# Patient Record
Sex: FEMALE | Race: WHITE | HISPANIC OR LATINO | Employment: FULL TIME | URBAN - METROPOLITAN AREA
[De-identification: names, ages, dates, MRNs, and addresses within clinical notes are randomized per-mention and may not be internally consistent; named-entity substitution may affect disease eponyms.]

---

## 2019-03-27 PROCEDURE — 99284 EMERGENCY DEPT VISIT MOD MDM: CPT

## 2019-03-27 PROCEDURE — 96365 THER/PROPH/DIAG IV INF INIT: CPT

## 2019-03-27 PROCEDURE — 96375 TX/PRO/DX INJ NEW DRUG ADDON: CPT

## 2019-03-28 ENCOUNTER — APPOINTMENT (OUTPATIENT)
Dept: RADIOLOGY | Facility: MEDICAL CENTER | Age: 39
End: 2019-03-28
Attending: EMERGENCY MEDICINE
Payer: MEDICAID

## 2019-03-28 ENCOUNTER — HOSPITAL ENCOUNTER (EMERGENCY)
Facility: MEDICAL CENTER | Age: 39
End: 2019-03-28
Attending: EMERGENCY MEDICINE
Payer: MEDICAID

## 2019-03-28 VITALS
BODY MASS INDEX: 31.61 KG/M2 | WEIGHT: 156.53 LBS | DIASTOLIC BLOOD PRESSURE: 82 MMHG | RESPIRATION RATE: 17 BRPM | SYSTOLIC BLOOD PRESSURE: 147 MMHG | HEART RATE: 107 BPM | OXYGEN SATURATION: 97 % | TEMPERATURE: 97.9 F

## 2019-03-28 DIAGNOSIS — T78.40XA ALLERGIC REACTION, INITIAL ENCOUNTER: ICD-10-CM

## 2019-03-28 DIAGNOSIS — J45.21 MILD INTERMITTENT ASTHMA WITH ACUTE EXACERBATION: ICD-10-CM

## 2019-03-28 DIAGNOSIS — B34.9 VIRAL SYNDROME: ICD-10-CM

## 2019-03-28 DIAGNOSIS — J06.9 UPPER RESPIRATORY TRACT INFECTION, UNSPECIFIED TYPE: ICD-10-CM

## 2019-03-28 LAB
ANION GAP SERPL CALC-SCNC: 7 MMOL/L (ref 0–11.9)
BASOPHILS # BLD AUTO: 0.6 % (ref 0–1.8)
BASOPHILS # BLD: 0.06 K/UL (ref 0–0.12)
BUN SERPL-MCNC: 9 MG/DL (ref 8–22)
CALCIUM SERPL-MCNC: 9 MG/DL (ref 8.5–10.5)
CHLORIDE SERPL-SCNC: 108 MMOL/L (ref 96–112)
CO2 SERPL-SCNC: 23 MMOL/L (ref 20–33)
CREAT SERPL-MCNC: 0.57 MG/DL (ref 0.5–1.4)
EOSINOPHIL # BLD AUTO: 0.6 K/UL (ref 0–0.51)
EOSINOPHIL NFR BLD: 6.4 % (ref 0–6.9)
ERYTHROCYTE [DISTWIDTH] IN BLOOD BY AUTOMATED COUNT: 51.3 FL (ref 35.9–50)
FLUAV RNA SPEC QL NAA+PROBE: NEGATIVE
FLUBV RNA SPEC QL NAA+PROBE: NEGATIVE
GLUCOSE SERPL-MCNC: 97 MG/DL (ref 65–99)
HCG SERPL QL: NEGATIVE
HCT VFR BLD AUTO: 39.7 % (ref 37–47)
HGB BLD-MCNC: 13 G/DL (ref 12–16)
IMM GRANULOCYTES # BLD AUTO: 0.02 K/UL (ref 0–0.11)
IMM GRANULOCYTES NFR BLD AUTO: 0.2 % (ref 0–0.9)
LYMPHOCYTES # BLD AUTO: 0.7 K/UL (ref 1–4.8)
LYMPHOCYTES NFR BLD: 7.5 % (ref 22–41)
MCH RBC QN AUTO: 26.7 PG (ref 27–33)
MCHC RBC AUTO-ENTMCNC: 32.7 G/DL (ref 33.6–35)
MCV RBC AUTO: 81.5 FL (ref 81.4–97.8)
MONOCYTES # BLD AUTO: 0.37 K/UL (ref 0–0.85)
MONOCYTES NFR BLD AUTO: 3.9 % (ref 0–13.4)
NEUTROPHILS # BLD AUTO: 7.62 K/UL (ref 2–7.15)
NEUTROPHILS NFR BLD: 81.4 % (ref 44–72)
NRBC # BLD AUTO: 0 K/UL
NRBC BLD-RTO: 0 /100 WBC
PLATELET # BLD AUTO: 389 K/UL (ref 164–446)
PMV BLD AUTO: 9.1 FL (ref 9–12.9)
POTASSIUM SERPL-SCNC: 3.2 MMOL/L (ref 3.6–5.5)
RBC # BLD AUTO: 4.87 M/UL (ref 4.2–5.4)
SODIUM SERPL-SCNC: 138 MMOL/L (ref 135–145)
WBC # BLD AUTO: 9.4 K/UL (ref 4.8–10.8)

## 2019-03-28 PROCEDURE — 96375 TX/PRO/DX INJ NEW DRUG ADDON: CPT

## 2019-03-28 PROCEDURE — 80048 BASIC METABOLIC PNL TOTAL CA: CPT

## 2019-03-28 PROCEDURE — 96365 THER/PROPH/DIAG IV INF INIT: CPT

## 2019-03-28 PROCEDURE — 700101 HCHG RX REV CODE 250: Performed by: EMERGENCY MEDICINE

## 2019-03-28 PROCEDURE — 84703 CHORIONIC GONADOTROPIN ASSAY: CPT

## 2019-03-28 PROCEDURE — 87502 INFLUENZA DNA AMP PROBE: CPT

## 2019-03-28 PROCEDURE — 94640 AIRWAY INHALATION TREATMENT: CPT

## 2019-03-28 PROCEDURE — 71045 X-RAY EXAM CHEST 1 VIEW: CPT

## 2019-03-28 PROCEDURE — A9270 NON-COVERED ITEM OR SERVICE: HCPCS | Performed by: EMERGENCY MEDICINE

## 2019-03-28 PROCEDURE — 85025 COMPLETE CBC W/AUTO DIFF WBC: CPT

## 2019-03-28 PROCEDURE — 700111 HCHG RX REV CODE 636 W/ 250 OVERRIDE (IP): Performed by: EMERGENCY MEDICINE

## 2019-03-28 PROCEDURE — 700102 HCHG RX REV CODE 250 W/ 637 OVERRIDE(OP): Performed by: EMERGENCY MEDICINE

## 2019-03-28 RX ORDER — MAGNESIUM SULFATE HEPTAHYDRATE 40 MG/ML
2 INJECTION, SOLUTION INTRAVENOUS ONCE
Status: COMPLETED | OUTPATIENT
Start: 2019-03-28 | End: 2019-03-28

## 2019-03-28 RX ORDER — DEXAMETHASONE SODIUM PHOSPHATE 10 MG/ML
16 INJECTION, SOLUTION INTRAMUSCULAR; INTRAVENOUS ONCE
Status: COMPLETED | OUTPATIENT
Start: 2019-03-28 | End: 2019-03-28

## 2019-03-28 RX ORDER — DIPHENHYDRAMINE HCL 25 MG
50 TABLET ORAL ONCE
Status: COMPLETED | OUTPATIENT
Start: 2019-03-28 | End: 2019-03-28

## 2019-03-28 RX ADMIN — MAGNESIUM SULFATE IN WATER 2 G: 40 INJECTION, SOLUTION INTRAVENOUS at 01:30

## 2019-03-28 RX ADMIN — DEXAMETHASONE SODIUM PHOSPHATE 16 MG: 10 INJECTION, SOLUTION INTRAMUSCULAR; INTRAVENOUS at 01:30

## 2019-03-28 RX ADMIN — ALBUTEROL SULFATE 2.5 MG: 2.5 SOLUTION RESPIRATORY (INHALATION) at 01:39

## 2019-03-28 RX ADMIN — ALBUTEROL SULFATE 2.5 MG: 2.5 SOLUTION RESPIRATORY (INHALATION) at 01:40

## 2019-03-28 RX ADMIN — DIPHENHYDRAMINE HCL 50 MG: 25 TABLET ORAL at 01:30

## 2019-03-28 RX ADMIN — ALBUTEROL SULFATE 2.5 MG: 2.5 SOLUTION RESPIRATORY (INHALATION) at 01:34

## 2019-03-28 ASSESSMENT — PAIN SCALES - WONG BAKER: WONGBAKER_NUMERICALRESPONSE: DOESN'T HURT AT ALL

## 2019-03-28 ASSESSMENT — ENCOUNTER SYMPTOMS
NAUSEA: 0
DIZZINESS: 1
VOMITING: 0
SHORTNESS OF BREATH: 1
ABDOMINAL PAIN: 0
COUGH: 1

## 2019-03-28 NOTE — ED NOTES
Pt understands discharge instructions follow up.  No S/S of adverse reactions lungs clear, speaking in full sentences

## 2019-03-28 NOTE — ED TRIAGE NOTES
Windy Saldivar  38 y.o. female  Chief Complaint   Patient presents with   • Flu Like Symptoms     cough, congestion, chills, malaise x 12 hours       Pt amb to triage with steady gait for above complaint.   Pt is alert and oriented, speaking in full sentences, follows commands and responds appropriately to questions. NAD. Resp are even and unlabored.  Pt placed in lobby. Pt educated on triage process. Pt encouraged to alert staff for any changes.  /92   Pulse (!) 118   Temp 36.6 °C (97.9 °F) (Temporal)   Resp 16   Wt 71 kg (156 lb 8.4 oz)   SpO2 95%   BMI 31.61 kg/m²

## 2019-03-28 NOTE — ED PROVIDER NOTES
ED Provider Note    Scribed for Breanne Abarca M.D. by Laura Guadalupe. 3/28/2019, 1:06 AM.    Means of arrival: walk in   History obtained from: patient   History limited by: none     CHIEF COMPLAINT  Chief Complaint   Patient presents with   • Flu Like Symptoms     cough, congestion, chills, malaise x 12 hours       HPI  Windy Saldivar is a 38 y.o. female with a history of asthma who presents to the Emergency Department with complaints of flu like symptoms such as cough and nasal congestion for the last 12 hours. The patient reports associated difficulty breathing, chills, and general malaise. The patient also reports that she took [facility] [place] Cold and Flu and suspects it may contain aspirin, because she is allergic to aspirin and has had more difficulty breathing and dizziness since taking it a couple of hours ago. She denies possibility of pregnancy. The patient has an inhaler at home which she used several times today. She denies chest pain, nausea, vomiting, abdominal pain    REVIEW OF SYSTEMS  Review of Systems   Constitutional: Positive for malaise/fatigue.   HENT: Positive for congestion.    Respiratory: Positive for cough and shortness of breath.    Gastrointestinal: Negative for abdominal pain, nausea and vomiting.   Neurological: Positive for dizziness.   All other systems reviewed and are negative.      PAST MEDICAL HISTORY   has a past medical history of ASTHMA; Hypertension; Low back pain; and PID (acute pelvic inflammatory disease) (5/23/2014).    SURGICAL HISTORY   has a past surgical history that includes IUD Removal (5/25/2014).    SOCIAL HISTORY  Social History   Substance Use Topics   • Smoking status: Current Every Day Smoker     Packs/day: 0.50     Last attempt to quit: 11/1/2013   • Smokeless tobacco: Not on file      Comment: quit 5 mos ago   • Alcohol use Yes      Comment: 2x week      History   Drug Use No       FAMILY HISTORY  History reviewed. No pertinent family  "history.    CURRENT MEDICATIONS  Home Medications     Reviewed by Dave Acosta R.N. (Registered Nurse) on 03/28/19 at 0012  Med List Status: <None>   Medication Last Dose Status   albuterol (PROVENTIL) 2.5mg/3ml Nebu Soln solution for nebulization  Active   albuterol (PROVENTIL) 2.5mg/3ml Nebu Soln solution for nebulization  Active   albuterol (PROVENTIL) 2.5mg/3ml Nebu Soln solution for nebulization  Active   albuterol (VENTOLIN OR PROVENTIL) 108 (90 BASE) MCG/ACT Aero Soln inhalation aerosol  Active                ALLERGIES  Allergies   Allergen Reactions   • Aspirin      Shortness of breath, asthma exacerbation, and \"a congested feeling.\"       PHYSICAL EXAM  VITAL SIGNS: /92   Pulse (!) 118   Temp 36.6 °C (97.9 °F) (Temporal)   Resp 16   Wt 71 kg (156 lb 8.4 oz)   SpO2 95%   BMI 31.61 kg/m²   Vitals reviewed by myself.  Physical Exam  Nursing note and vitals reviewed.  Constitutional: Well-developed and well-nourished. No acute distress. Appears short of breath   HENT: Head is normocephalic and atraumatic.  Eyes: extra-ocular movements intact  Cardiovascular: tachycardic rate and regular rhythm. No murmur heard.  Pulmonary/Chest: Diminished air movement throughout all lung fields. No wheezes or rales.   Abdominal: Soft and non-tender. No distention.    Musculoskeletal: Extremities exhibit normal range of motion without edema or tenderness.   Neurological: Awake and alert  Skin: Skin is warm and dry. No rash.       DIAGNOSTIC STUDIES /  LABS  Labs Reviewed   CBC WITH DIFFERENTIAL - Abnormal; Notable for the following:        Result Value    MCH 26.7 (*)     MCHC 32.7 (*)     RDW 51.3 (*)     Neutrophils-Polys 81.40 (*)     Lymphocytes 7.50 (*)     Neutrophils (Absolute) 7.62 (*)     Lymphs (Absolute) 0.70 (*)     Eos (Absolute) 0.60 (*)     All other components within normal limits   BASIC METABOLIC PANEL - Abnormal; Notable for the following:     Potassium 3.2 (*)     All other components within " normal limits   INFLUENZA A/B BY PCR   HCG QUAL SERUM   ESTIMATED GFR       All labs reviewed by me.    RADIOLOGY  DX-CHEST-PORTABLE (1 VIEW)   Final Result      No radiographic evidence of acute cardiopulmonary process.        The radiologist's interpretation of all radiological studies have been reviewed by me.        REASSESSMENT  1:10 AM - I evaluated patient at bedside.  I informed them of my plan of care today given their presentation.     2:30 AM - I reevaluated patient at bedside and discussed diagnostic study results. She has good aeration in all lung fields. I also discussed the plan for discharge as outlined below.       COURSE & MEDICAL DECISION MAKING  Nursing notes, VS, PMSFHx reviewed in chart.    Patient is a 38 year old female who comes in for shortness of breath.  Differential diagnosis includes upper respiratory infection, viral syndrome, asthma exacerbation, influenza, pneumonia, allergic reaction.  Diagnostic workup includes labs, chest x-ray, and influenza swab.    Initial assessment patient appears mildly short of breath with diminished air movement in all lung fields and associated tachycardia.  This could be secondary to asthma exacerbation versus possible allergic reaction.  Patient is treated with Benadryl, dexamethasone, magnesium, and back to back albuterol treatments.  After treatments patient is feeling greatly improved and her tachycardia is also noted to trend downwards.  Her air movement in all lung fields is improved on auscultation.  Labs returned and are unremarkable.  Flu swab is negative.  Chest x-ray demonstrates no evidence of pneumonia.  Therefore patient is reassured and advised on symptomatic viral illness versus asthma exacerbation versus allergic reaction.  Patient is then given strict return precautions and discharged home in stable condition.      FINAL IMPRESSION  1. Upper respiratory tract infection, unspecified type    2. Viral syndrome    3. Allergic reaction,  initial encounter    4. Mild intermittent asthma with acute exacerbation          ILaura (Scribe), am scribing for, and in the presence of, Breanne Abarca M.D..    Electronically signed by: Laura Guadalupe (Scribe), 3/28/2019    IBreanne M.D. personally performed the services described in this documentation, as scribed by Laura Guadalupe in my presence, and it is both accurate and complete.    C    The note accurately reflects work and decisions made by me.  Breanne Abarca  3/28/2019  3:59 AM

## 2020-03-18 ENCOUNTER — TELEPHONE (OUTPATIENT)
Dept: NEUROLOGY | Facility: MEDICAL CENTER | Age: 40
End: 2020-03-18

## 2020-03-18 NOTE — TELEPHONE ENCOUNTER
1. Caller Name: Windy                        Call Back Number:   [facility] PCP or Specialty Provider: No          2.  Does patient have any active symptoms of respiratory illness (fever OR cough OR shortness of breath)? Yes, the patient reports the following respiratory symptoms: shortness of breath.    3.  Does patient have any comoribidities? COPD    4.  In the last 30 days, has the patient traveled outside of the country OR in a high risk area within the [place] OR have any known contact with someone who has or is suspected to have COVID-19?  No.    5. Disposition: Advised to perform self care, monitor for worsening symptoms and to call back in 3 days if no improvement, if SOB unrelieved by nebulizers, call 911    Note routed to PCP: STEPHANIE only.

## 2023-02-17 ENCOUNTER — HOSPITAL ENCOUNTER (OUTPATIENT)
Dept: RADIOLOGY | Facility: MEDICAL CENTER | Age: 43
End: 2023-02-17
Payer: COMMERCIAL

## 2023-07-16 ENCOUNTER — HOSPITAL ENCOUNTER (INPATIENT)
Facility: MEDICAL CENTER | Age: 43
LOS: 4 days | DRG: 433 | End: 2023-07-20
Attending: EMERGENCY MEDICINE | Admitting: INTERNAL MEDICINE
Payer: COMMERCIAL

## 2023-07-16 DIAGNOSIS — K70.31 ALCOHOLIC CIRRHOSIS OF LIVER WITH ASCITES (HCC): ICD-10-CM

## 2023-07-16 DIAGNOSIS — K80.20 CALCULUS OF GALLBLADDER WITHOUT CHOLECYSTITIS WITHOUT OBSTRUCTION: ICD-10-CM

## 2023-07-16 DIAGNOSIS — K92.2 GASTROINTESTINAL HEMORRHAGE, UNSPECIFIED GASTROINTESTINAL HEMORRHAGE TYPE: ICD-10-CM

## 2023-07-16 DIAGNOSIS — F10.930 ALCOHOL WITHDRAWAL SYNDROME WITHOUT COMPLICATION (HCC): ICD-10-CM

## 2023-07-16 PROCEDURE — 80053 COMPREHEN METABOLIC PANEL: CPT

## 2023-07-16 PROCEDURE — 85610 PROTHROMBIN TIME: CPT

## 2023-07-16 PROCEDURE — 85730 THROMBOPLASTIN TIME PARTIAL: CPT

## 2023-07-16 PROCEDURE — 36415 COLL VENOUS BLD VENIPUNCTURE: CPT

## 2023-07-16 PROCEDURE — 82140 ASSAY OF AMMONIA: CPT

## 2023-07-16 PROCEDURE — 770020 HCHG ROOM/CARE - TELE (206)

## 2023-07-16 PROCEDURE — HZ2ZZZZ DETOXIFICATION SERVICES FOR SUBSTANCE ABUSE TREATMENT: ICD-10-PCS | Performed by: INTERNAL MEDICINE

## 2023-07-16 PROCEDURE — 83690 ASSAY OF LIPASE: CPT

## 2023-07-16 PROCEDURE — 96374 THER/PROPH/DIAG INJ IV PUSH: CPT

## 2023-07-16 PROCEDURE — 700111 HCHG RX REV CODE 636 W/ 250 OVERRIDE (IP): Performed by: EMERGENCY MEDICINE

## 2023-07-16 PROCEDURE — 99285 EMERGENCY DEPT VISIT HI MDM: CPT

## 2023-07-16 PROCEDURE — 83605 ASSAY OF LACTIC ACID: CPT

## 2023-07-16 PROCEDURE — 87040 BLOOD CULTURE FOR BACTERIA: CPT

## 2023-07-16 PROCEDURE — 99223 1ST HOSP IP/OBS HIGH 75: CPT | Performed by: INTERNAL MEDICINE

## 2023-07-16 RX ORDER — HYDROMORPHONE HYDROCHLORIDE 1 MG/ML
0.5 INJECTION, SOLUTION INTRAMUSCULAR; INTRAVENOUS; SUBCUTANEOUS ONCE
Status: COMPLETED | OUTPATIENT
Start: 2023-07-16 | End: 2023-07-16

## 2023-07-16 RX ORDER — NICOTINE 21 MG/24HR
21 PATCH, TRANSDERMAL 24 HOURS TRANSDERMAL
Status: CANCELLED | OUTPATIENT
Start: 2023-07-17

## 2023-07-16 RX ORDER — ALBUTEROL SULFATE 2.5 MG/3ML
2.5 SOLUTION RESPIRATORY (INHALATION) EVERY 4 HOURS PRN
Status: DISCONTINUED | OUTPATIENT
Start: 2023-07-16 | End: 2023-07-20 | Stop reason: HOSPADM

## 2023-07-16 RX ORDER — LORAZEPAM 2 MG/ML
2 INJECTION INTRAMUSCULAR
Status: DISCONTINUED | OUTPATIENT
Start: 2023-07-16 | End: 2023-07-18

## 2023-07-16 RX ORDER — LORAZEPAM 2 MG/ML
0.5 INJECTION INTRAMUSCULAR EVERY 4 HOURS PRN
Status: DISCONTINUED | OUTPATIENT
Start: 2023-07-16 | End: 2023-07-18

## 2023-07-16 RX ORDER — LORAZEPAM 2 MG/1
2 TABLET ORAL
Status: DISCONTINUED | OUTPATIENT
Start: 2023-07-16 | End: 2023-07-18

## 2023-07-16 RX ORDER — LORAZEPAM 2 MG/ML
1 INJECTION INTRAMUSCULAR ONCE
Status: COMPLETED | OUTPATIENT
Start: 2023-07-16 | End: 2023-07-17

## 2023-07-16 RX ORDER — LORAZEPAM 2 MG/ML
1.5 INJECTION INTRAMUSCULAR
Status: DISCONTINUED | OUTPATIENT
Start: 2023-07-16 | End: 2023-07-18

## 2023-07-16 RX ORDER — LORAZEPAM 1 MG/1
1 TABLET ORAL EVERY 4 HOURS PRN
Status: DISCONTINUED | OUTPATIENT
Start: 2023-07-16 | End: 2023-07-18

## 2023-07-16 RX ORDER — LORAZEPAM 2 MG/1
4 TABLET ORAL
Status: DISCONTINUED | OUTPATIENT
Start: 2023-07-16 | End: 2023-07-18

## 2023-07-16 RX ORDER — SODIUM CHLORIDE, SODIUM LACTATE, POTASSIUM CHLORIDE, CALCIUM CHLORIDE 600; 310; 30; 20 MG/100ML; MG/100ML; MG/100ML; MG/100ML
INJECTION, SOLUTION INTRAVENOUS CONTINUOUS
Status: DISCONTINUED | OUTPATIENT
Start: 2023-07-16 | End: 2023-07-17

## 2023-07-16 RX ORDER — LORAZEPAM 2 MG/ML
1 INJECTION INTRAMUSCULAR
Status: DISCONTINUED | OUTPATIENT
Start: 2023-07-16 | End: 2023-07-18

## 2023-07-16 RX ORDER — LORAZEPAM 0.5 MG/1
0.5 TABLET ORAL EVERY 4 HOURS PRN
Status: DISCONTINUED | OUTPATIENT
Start: 2023-07-16 | End: 2023-07-18

## 2023-07-16 RX ADMIN — HYDROMORPHONE HYDROCHLORIDE 0.5 MG: 1 INJECTION, SOLUTION INTRAMUSCULAR; INTRAVENOUS; SUBCUTANEOUS at 22:57

## 2023-07-16 ASSESSMENT — PAIN DESCRIPTION - PAIN TYPE: TYPE: ACUTE PAIN

## 2023-07-17 ENCOUNTER — APPOINTMENT (OUTPATIENT)
Dept: RADIOLOGY | Facility: MEDICAL CENTER | Age: 43
DRG: 433 | End: 2023-07-17
Attending: INTERNAL MEDICINE
Payer: COMMERCIAL

## 2023-07-17 ENCOUNTER — APPOINTMENT (OUTPATIENT)
Dept: RADIOLOGY | Facility: MEDICAL CENTER | Age: 43
DRG: 433 | End: 2023-07-17
Attending: RADIOLOGY
Payer: COMMERCIAL

## 2023-07-17 ENCOUNTER — APPOINTMENT (OUTPATIENT)
Dept: CARDIOLOGY | Facility: MEDICAL CENTER | Age: 43
DRG: 433 | End: 2023-07-17
Attending: INTERNAL MEDICINE
Payer: COMMERCIAL

## 2023-07-17 ENCOUNTER — HOSPITAL ENCOUNTER (OUTPATIENT)
Dept: RADIOLOGY | Facility: MEDICAL CENTER | Age: 43
End: 2023-07-17

## 2023-07-17 PROBLEM — R65.10 SIRS (SYSTEMIC INFLAMMATORY RESPONSE SYNDROME) (HCC): Status: ACTIVE | Noted: 2023-07-17

## 2023-07-17 PROBLEM — F10.20 ALCOHOL DEPENDENCE (HCC): Status: ACTIVE | Noted: 2023-07-17

## 2023-07-17 PROBLEM — R18.8 ASCITES: Status: ACTIVE | Noted: 2023-07-17

## 2023-07-17 PROBLEM — J90 PLEURAL EFFUSION: Status: ACTIVE | Noted: 2023-07-17

## 2023-07-17 PROBLEM — K70.30 ALC (ALCOHOLIC LIVER CIRRHOSIS) (HCC): Status: ACTIVE | Noted: 2023-07-17

## 2023-07-17 PROBLEM — R10.9 ABDOMINAL PAIN: Status: ACTIVE | Noted: 2023-07-17

## 2023-07-17 LAB
ALBUMIN SERPL BCP-MCNC: 2.6 G/DL (ref 3.2–4.9)
ALBUMIN SERPL BCP-MCNC: 2.7 G/DL (ref 3.2–4.9)
ALBUMIN/GLOB SERPL: 0.6 G/DL
ALBUMIN/GLOB SERPL: 0.6 G/DL
ALP SERPL-CCNC: 186 U/L (ref 30–99)
ALP SERPL-CCNC: 193 U/L (ref 30–99)
ALT SERPL-CCNC: 22 U/L (ref 2–50)
ALT SERPL-CCNC: 25 U/L (ref 2–50)
AMMONIA PLAS-SCNC: 63 UMOL/L (ref 11–45)
AMYLASE FLD-CCNC: 15 U/L
ANION GAP SERPL CALC-SCNC: 10 MMOL/L (ref 7–16)
ANION GAP SERPL CALC-SCNC: 7 MMOL/L (ref 7–16)
ANISOCYTOSIS BLD QL SMEAR: ABNORMAL
APPEARANCE FLD: NORMAL
APPEARANCE UR: CLEAR
APPEARANCE UR: CLEAR
APTT PPP: 46.2 SEC (ref 24.7–36)
AST SERPL-CCNC: 84 U/L (ref 12–45)
AST SERPL-CCNC: 91 U/L (ref 12–45)
BACTERIA #/AREA URNS HPF: ABNORMAL /HPF
BACTERIA #/AREA URNS HPF: NEGATIVE /HPF
BASOPHILS # BLD AUTO: 0.9 % (ref 0–1.8)
BASOPHILS # BLD: 0.12 K/UL (ref 0–0.12)
BILIRUB CONJ SERPL-MCNC: 4.9 MG/DL (ref 0.1–0.5)
BILIRUB SERPL-MCNC: 10 MG/DL (ref 0.1–1.5)
BILIRUB SERPL-MCNC: 9.7 MG/DL (ref 0.1–1.5)
BILIRUB UR QL STRIP.AUTO: ABNORMAL
BODY FLD TYPE: NORMAL
BUN SERPL-MCNC: 10 MG/DL (ref 8–22)
BUN SERPL-MCNC: 9 MG/DL (ref 8–22)
CALCIUM ALBUM COR SERPL-MCNC: 9 MG/DL (ref 8.5–10.5)
CALCIUM ALBUM COR SERPL-MCNC: 9.1 MG/DL (ref 8.5–10.5)
CALCIUM SERPL-MCNC: 8 MG/DL (ref 8.5–10.5)
CALCIUM SERPL-MCNC: 8 MG/DL (ref 8.5–10.5)
CELLS FLD: 2
CHLORIDE SERPL-SCNC: 104 MMOL/L (ref 96–112)
CHLORIDE SERPL-SCNC: 105 MMOL/L (ref 96–112)
CO2 SERPL-SCNC: 20 MMOL/L (ref 20–33)
CO2 SERPL-SCNC: 24 MMOL/L (ref 20–33)
COLOR FLD: YELLOW
COLOR UR: ABNORMAL
COLOR UR: ABNORMAL
CREAT SERPL-MCNC: 0.34 MG/DL (ref 0.5–1.4)
CREAT SERPL-MCNC: 0.45 MG/DL (ref 0.5–1.4)
CYTOLOGY REG CYTOL: NORMAL
EKG IMPRESSION: NORMAL
EOSINOPHIL # BLD AUTO: 0.12 K/UL (ref 0–0.51)
EOSINOPHIL NFR BLD: 0.9 % (ref 0–6.9)
EPI CELLS #/AREA URNS HPF: ABNORMAL /HPF
EPI CELLS #/AREA URNS HPF: ABNORMAL /HPF
ERYTHROCYTE [DISTWIDTH] IN BLOOD BY AUTOMATED COUNT: 58.4 FL (ref 35.9–50)
FERRITIN SERPL-MCNC: 399 NG/ML (ref 10–291)
FLUAV RNA SPEC QL NAA+PROBE: NEGATIVE
FLUBV RNA SPEC QL NAA+PROBE: NEGATIVE
GFR SERPLBLD CREATININE-BSD FMLA CKD-EPI: 122 ML/MIN/1.73 M 2
GFR SERPLBLD CREATININE-BSD FMLA CKD-EPI: 130 ML/MIN/1.73 M 2
GLOBULIN SER CALC-MCNC: 4.1 G/DL (ref 1.9–3.5)
GLOBULIN SER CALC-MCNC: 4.3 G/DL (ref 1.9–3.5)
GLUCOSE FLD-MCNC: 110 MG/DL
GLUCOSE SERPL-MCNC: 102 MG/DL (ref 65–99)
GLUCOSE SERPL-MCNC: 93 MG/DL (ref 65–99)
GLUCOSE UR STRIP.AUTO-MCNC: NEGATIVE MG/DL
HCT VFR BLD AUTO: 25.2 % (ref 37–47)
HCT VFR BLD AUTO: 26.6 % (ref 37–47)
HCT VFR BLD AUTO: 27.7 % (ref 37–47)
HGB BLD-MCNC: 8.2 G/DL (ref 12–16)
HGB BLD-MCNC: 8.9 G/DL (ref 12–16)
HGB BLD-MCNC: 8.9 G/DL (ref 12–16)
HGB RETIC QN AUTO: 37.6 PG/CELL (ref 29–35)
HYALINE CASTS #/AREA URNS LPF: ABNORMAL /LPF
HYALINE CASTS #/AREA URNS LPF: ABNORMAL /LPF
IMM RETICS NFR: 20.9 % (ref 2.6–16.1)
INR PPP: 2.1 (ref 0.87–1.13)
IRON SATN MFR SERPL: ABNORMAL % (ref 15–55)
IRON SERPL-MCNC: 142 UG/DL (ref 40–170)
KETONES UR STRIP.AUTO-MCNC: 15 MG/DL
LACTATE SERPL-SCNC: 0.8 MMOL/L (ref 0.5–2)
LDH FLD L TO P-CCNC: 118 U/L
LEUKOCYTE ESTERASE UR QL STRIP.AUTO: ABNORMAL
LIPASE SERPL-CCNC: 36 U/L (ref 11–82)
LV EJECT FRACT  99904: 65
LV EJECT FRACT MOD 2C 99903: 66.73
LV EJECT FRACT MOD 4C 99902: 68.32
LV EJECT FRACT MOD BP 99901: 67.2
LYMPHOCYTES # BLD AUTO: 0.55 K/UL (ref 1–4.8)
LYMPHOCYTES NFR BLD: 4.3 % (ref 22–41)
LYMPHOCYTES NFR FLD: 3 %
MACROCYTES BLD QL SMEAR: ABNORMAL
MAGNESIUM SERPL-MCNC: 1.7 MG/DL (ref 1.5–2.5)
MANUAL DIFF BLD: NORMAL
MCH RBC QN AUTO: 34.7 PG (ref 27–33)
MCHC RBC AUTO-ENTMCNC: 32.5 G/DL (ref 32.2–35.5)
MCV RBC AUTO: 106.8 FL (ref 81.4–97.8)
MICRO URNS: ABNORMAL
MICRO URNS: ABNORMAL
MONOCYTES # BLD AUTO: 0.67 K/UL (ref 0–0.85)
MONOCYTES NFR BLD AUTO: 5.2 % (ref 0–13.4)
MONOS+MACROS NFR FLD MANUAL: 14 %
MORPHOLOGY BLD-IMP: NORMAL
MUCOUS THREADS #/AREA URNS HPF: ABNORMAL /HPF
NEUTROPHILS # BLD AUTO: 11.44 K/UL (ref 1.82–7.42)
NEUTROPHILS NFR BLD: 88.7 % (ref 44–72)
NEUTROPHILS NFR FLD: 81 %
NITRITE UR QL STRIP.AUTO: POSITIVE
NRBC # BLD AUTO: 0.03 K/UL
NRBC BLD-RTO: 0.2 /100 WBC (ref 0–0.2)
NUC CELL # FLD: 1522 CELLS/UL
PH FLD: 8 [PH]
PH UR STRIP.AUTO: 7.5 [PH] (ref 5–8)
PHOSPHATE SERPL-MCNC: 2.3 MG/DL (ref 2.5–4.5)
PLATELET # BLD AUTO: 108 K/UL (ref 164–446)
PLATELET BLD QL SMEAR: NORMAL
PMV BLD AUTO: 10.9 FL (ref 9–12.9)
POTASSIUM SERPL-SCNC: 3.3 MMOL/L (ref 3.6–5.5)
POTASSIUM SERPL-SCNC: 3.9 MMOL/L (ref 3.6–5.5)
PROT FLD-MCNC: 1.3 G/DL
PROT SERPL-MCNC: 6.7 G/DL (ref 6–8.2)
PROT SERPL-MCNC: 7 G/DL (ref 6–8.2)
PROT UR QL STRIP: 30 MG/DL
PROTHROMBIN TIME: 23 SEC (ref 12–14.6)
RBC # BLD AUTO: 2.36 M/UL (ref 4.2–5.4)
RBC # FLD: 2000 CELLS/UL
RBC # URNS HPF: ABNORMAL /HPF
RBC # URNS HPF: ABNORMAL /HPF
RBC BLD AUTO: PRESENT
RBC UR QL AUTO: ABNORMAL
RETICS # AUTO: 0.12 M/UL (ref 0.04–0.12)
RETICS/RBC NFR: 4.8 % (ref 0.8–2.6)
RSV RNA SPEC QL NAA+PROBE: NEGATIVE
SARS-COV-2 RNA RESP QL NAA+PROBE: NOTDETECTED
SODIUM SERPL-SCNC: 135 MMOL/L (ref 135–145)
SODIUM SERPL-SCNC: 135 MMOL/L (ref 135–145)
SP GR UR STRIP.AUTO: 1.02
SP GR UR STRIP.AUTO: ABNORMAL
SPECIMEN SOURCE: NORMAL
TIBC SERPL-MCNC: ABNORMAL UG/DL (ref 250–450)
UIBC SERPL-MCNC: <17 UG/DL (ref 110–370)
UROBILINOGEN UR STRIP.AUTO-MCNC: 2 MG/DL
VIT B12 SERPL-MCNC: 1268 PG/ML (ref 211–911)
WBC # BLD AUTO: 12.9 K/UL (ref 4.8–10.8)
WBC #/AREA URNS HPF: ABNORMAL /HPF
WBC #/AREA URNS HPF: ABNORMAL /HPF

## 2023-07-17 PROCEDURE — C9113 INJ PANTOPRAZOLE SODIUM, VIA: HCPCS | Performed by: INTERNAL MEDICINE

## 2023-07-17 PROCEDURE — 99222 1ST HOSP IP/OBS MODERATE 55: CPT | Performed by: SPECIALIST

## 2023-07-17 PROCEDURE — 700105 HCHG RX REV CODE 258: Mod: JZ | Performed by: INTERNAL MEDICINE

## 2023-07-17 PROCEDURE — 36415 COLL VENOUS BLD VENIPUNCTURE: CPT

## 2023-07-17 PROCEDURE — 49083 ABD PARACENTESIS W/IMAGING: CPT

## 2023-07-17 PROCEDURE — 87070 CULTURE OTHR SPECIMN AEROBIC: CPT

## 2023-07-17 PROCEDURE — 89051 BODY FLUID CELL COUNT: CPT

## 2023-07-17 PROCEDURE — 71045 X-RAY EXAM CHEST 1 VIEW: CPT

## 2023-07-17 PROCEDURE — 82945 GLUCOSE OTHER FLUID: CPT

## 2023-07-17 PROCEDURE — 82150 ASSAY OF AMYLASE: CPT

## 2023-07-17 PROCEDURE — 83010 ASSAY OF HAPTOGLOBIN QUANT: CPT

## 2023-07-17 PROCEDURE — 85007 BL SMEAR W/DIFF WBC COUNT: CPT

## 2023-07-17 PROCEDURE — 82248 BILIRUBIN DIRECT: CPT

## 2023-07-17 PROCEDURE — 81001 URINALYSIS AUTO W/SCOPE: CPT

## 2023-07-17 PROCEDURE — 87116 MYCOBACTERIA CULTURE: CPT

## 2023-07-17 PROCEDURE — 700111 HCHG RX REV CODE 636 W/ 250 OVERRIDE (IP): Performed by: INTERNAL MEDICINE

## 2023-07-17 PROCEDURE — 85046 RETICYTE/HGB CONCENTRATE: CPT

## 2023-07-17 PROCEDURE — 83550 IRON BINDING TEST: CPT

## 2023-07-17 PROCEDURE — 85014 HEMATOCRIT: CPT | Mod: 91

## 2023-07-17 PROCEDURE — 80053 COMPREHEN METABOLIC PANEL: CPT

## 2023-07-17 PROCEDURE — 83735 ASSAY OF MAGNESIUM: CPT

## 2023-07-17 PROCEDURE — 82607 VITAMIN B-12: CPT

## 2023-07-17 PROCEDURE — 93306 TTE W/DOPPLER COMPLETE: CPT | Mod: 26 | Performed by: INTERNAL MEDICINE

## 2023-07-17 PROCEDURE — 87205 SMEAR GRAM STAIN: CPT | Mod: 91

## 2023-07-17 PROCEDURE — 83540 ASSAY OF IRON: CPT

## 2023-07-17 PROCEDURE — 83615 LACTATE (LD) (LDH) ENZYME: CPT

## 2023-07-17 PROCEDURE — 0241U HCHG SARS-COV-2 COVID-19 NFCT DS RESP RNA 4 TRGT MIC: CPT

## 2023-07-17 PROCEDURE — 770020 HCHG ROOM/CARE - TELE (206)

## 2023-07-17 PROCEDURE — A9270 NON-COVERED ITEM OR SERVICE: HCPCS | Performed by: INTERNAL MEDICINE

## 2023-07-17 PROCEDURE — 87015 SPECIMEN INFECT AGNT CONCNTJ: CPT | Mod: 91

## 2023-07-17 PROCEDURE — C9803 HOPD COVID-19 SPEC COLLECT: HCPCS | Performed by: INTERNAL MEDICINE

## 2023-07-17 PROCEDURE — 83986 ASSAY PH BODY FLUID NOS: CPT

## 2023-07-17 PROCEDURE — 88112 CYTOPATH CELL ENHANCE TECH: CPT

## 2023-07-17 PROCEDURE — 96375 TX/PRO/DX INJ NEW DRUG ADDON: CPT

## 2023-07-17 PROCEDURE — 700111 HCHG RX REV CODE 636 W/ 250 OVERRIDE (IP): Performed by: EMERGENCY MEDICINE

## 2023-07-17 PROCEDURE — 99233 SBSQ HOSP IP/OBS HIGH 50: CPT | Performed by: INTERNAL MEDICINE

## 2023-07-17 PROCEDURE — 0W993ZZ DRAINAGE OF RIGHT PLEURAL CAVITY, PERCUTANEOUS APPROACH: ICD-10-PCS | Performed by: RADIOLOGY

## 2023-07-17 PROCEDURE — 85018 HEMOGLOBIN: CPT

## 2023-07-17 PROCEDURE — 85025 COMPLETE CBC W/AUTO DIFF WBC: CPT

## 2023-07-17 PROCEDURE — 88305 TISSUE EXAM BY PATHOLOGIST: CPT

## 2023-07-17 PROCEDURE — 82728 ASSAY OF FERRITIN: CPT

## 2023-07-17 PROCEDURE — 700102 HCHG RX REV CODE 250 W/ 637 OVERRIDE(OP): Performed by: INTERNAL MEDICINE

## 2023-07-17 PROCEDURE — 93005 ELECTROCARDIOGRAM TRACING: CPT | Performed by: INTERNAL MEDICINE

## 2023-07-17 PROCEDURE — 84157 ASSAY OF PROTEIN OTHER: CPT

## 2023-07-17 PROCEDURE — 700105 HCHG RX REV CODE 258: Mod: JZ | Performed by: EMERGENCY MEDICINE

## 2023-07-17 PROCEDURE — 93306 TTE W/DOPPLER COMPLETE: CPT

## 2023-07-17 PROCEDURE — 87102 FUNGUS ISOLATION CULTURE: CPT

## 2023-07-17 PROCEDURE — 32555 ASPIRATE PLEURA W/ IMAGING: CPT | Mod: RT

## 2023-07-17 PROCEDURE — 84100 ASSAY OF PHOSPHORUS: CPT

## 2023-07-17 PROCEDURE — 93010 ELECTROCARDIOGRAM REPORT: CPT | Performed by: INTERNAL MEDICINE

## 2023-07-17 PROCEDURE — 87206 SMEAR FLUORESCENT/ACID STAI: CPT

## 2023-07-17 RX ORDER — LORAZEPAM 0.5 MG/1
0.5 TABLET ORAL EVERY 4 HOURS PRN
Status: DISCONTINUED | OUTPATIENT
Start: 2023-07-17 | End: 2023-07-18

## 2023-07-17 RX ORDER — PROMETHAZINE HYDROCHLORIDE 25 MG/1
12.5-25 SUPPOSITORY RECTAL EVERY 4 HOURS PRN
Status: DISCONTINUED | OUTPATIENT
Start: 2023-07-17 | End: 2023-07-20 | Stop reason: HOSPADM

## 2023-07-17 RX ORDER — ONDANSETRON 4 MG/1
4 TABLET, ORALLY DISINTEGRATING ORAL EVERY 4 HOURS PRN
Status: DISCONTINUED | OUTPATIENT
Start: 2023-07-17 | End: 2023-07-20 | Stop reason: HOSPADM

## 2023-07-17 RX ORDER — PANTOPRAZOLE SODIUM 40 MG/10ML
40 INJECTION, POWDER, LYOPHILIZED, FOR SOLUTION INTRAVENOUS 2 TIMES DAILY
Status: DISCONTINUED | OUTPATIENT
Start: 2023-07-17 | End: 2023-07-20 | Stop reason: HOSPADM

## 2023-07-17 RX ORDER — BISACODYL 10 MG
10 SUPPOSITORY, RECTAL RECTAL
Status: DISCONTINUED | OUTPATIENT
Start: 2023-07-17 | End: 2023-07-18

## 2023-07-17 RX ORDER — ACETAMINOPHEN 325 MG/1
650 TABLET ORAL EVERY 12 HOURS PRN
Status: DISCONTINUED | OUTPATIENT
Start: 2023-07-17 | End: 2023-07-20 | Stop reason: HOSPADM

## 2023-07-17 RX ORDER — OXYCODONE HYDROCHLORIDE 5 MG/1
5 TABLET ORAL
Status: DISCONTINUED | OUTPATIENT
Start: 2023-07-17 | End: 2023-07-20 | Stop reason: HOSPADM

## 2023-07-17 RX ORDER — PREDNISOLONE 15 MG/5ML
1 SOLUTION ORAL DAILY
Status: DISCONTINUED | OUTPATIENT
Start: 2023-07-17 | End: 2023-07-17

## 2023-07-17 RX ORDER — PROMETHAZINE HYDROCHLORIDE 25 MG/1
12.5-25 TABLET ORAL EVERY 4 HOURS PRN
Status: DISCONTINUED | OUTPATIENT
Start: 2023-07-17 | End: 2023-07-20 | Stop reason: HOSPADM

## 2023-07-17 RX ORDER — OCTREOTIDE ACETATE 100 UG/ML
50 INJECTION, SOLUTION INTRAVENOUS; SUBCUTANEOUS ONCE
Status: COMPLETED | OUTPATIENT
Start: 2023-07-17 | End: 2023-07-17

## 2023-07-17 RX ORDER — OXYCODONE HYDROCHLORIDE 10 MG/1
10 TABLET ORAL
Status: DISCONTINUED | OUTPATIENT
Start: 2023-07-17 | End: 2023-07-20 | Stop reason: HOSPADM

## 2023-07-17 RX ORDER — FOLIC ACID 1 MG/1
1 TABLET ORAL DAILY
Status: COMPLETED | OUTPATIENT
Start: 2023-07-17 | End: 2023-07-20

## 2023-07-17 RX ORDER — LORAZEPAM 2 MG/ML
1.5 INJECTION INTRAMUSCULAR
Status: DISCONTINUED | OUTPATIENT
Start: 2023-07-17 | End: 2023-07-18

## 2023-07-17 RX ORDER — LORAZEPAM 1 MG/1
1 TABLET ORAL EVERY 4 HOURS PRN
Status: DISCONTINUED | OUTPATIENT
Start: 2023-07-17 | End: 2023-07-18

## 2023-07-17 RX ORDER — LORAZEPAM 2 MG/ML
2 INJECTION INTRAMUSCULAR
Status: DISCONTINUED | OUTPATIENT
Start: 2023-07-17 | End: 2023-07-18

## 2023-07-17 RX ORDER — PROCHLORPERAZINE EDISYLATE 5 MG/ML
5-10 INJECTION INTRAMUSCULAR; INTRAVENOUS EVERY 4 HOURS PRN
Status: DISCONTINUED | OUTPATIENT
Start: 2023-07-17 | End: 2023-07-20 | Stop reason: HOSPADM

## 2023-07-17 RX ORDER — MORPHINE SULFATE 4 MG/ML
4 INJECTION INTRAVENOUS
Status: DISCONTINUED | OUTPATIENT
Start: 2023-07-17 | End: 2023-07-20 | Stop reason: HOSPADM

## 2023-07-17 RX ORDER — M-VIT,TX,IRON,MINS/CALC/FOLIC 27MG-0.4MG
1 TABLET ORAL DAILY
Status: ON HOLD | COMMUNITY
End: 2024-01-03

## 2023-07-17 RX ORDER — GAUZE BANDAGE 2" X 2"
100 BANDAGE TOPICAL DAILY
Status: COMPLETED | OUTPATIENT
Start: 2023-07-17 | End: 2023-07-20

## 2023-07-17 RX ORDER — IPRATROPIUM BROMIDE 21 UG/1
2 SPRAY, METERED NASAL 2 TIMES DAILY
COMMUNITY
Start: 2023-05-23

## 2023-07-17 RX ORDER — DEXAMETHASONE 4 MG/1
1 TABLET ORAL 2 TIMES DAILY
COMMUNITY
Start: 2023-05-23

## 2023-07-17 RX ORDER — ONDANSETRON 2 MG/ML
4 INJECTION INTRAMUSCULAR; INTRAVENOUS EVERY 4 HOURS PRN
Status: DISCONTINUED | OUTPATIENT
Start: 2023-07-17 | End: 2023-07-20 | Stop reason: HOSPADM

## 2023-07-17 RX ORDER — LORAZEPAM 2 MG/ML
0.5 INJECTION INTRAMUSCULAR EVERY 4 HOURS PRN
Status: DISCONTINUED | OUTPATIENT
Start: 2023-07-17 | End: 2023-07-18

## 2023-07-17 RX ORDER — LORAZEPAM 2 MG/ML
1 INJECTION INTRAMUSCULAR
Status: DISCONTINUED | OUTPATIENT
Start: 2023-07-17 | End: 2023-07-18

## 2023-07-17 RX ORDER — FLUTICASONE PROPIONATE 50 MCG
2 SPRAY, SUSPENSION (ML) NASAL DAILY
COMMUNITY
Start: 2023-05-23

## 2023-07-17 RX ORDER — LORAZEPAM 2 MG/1
2 TABLET ORAL
Status: DISCONTINUED | OUTPATIENT
Start: 2023-07-17 | End: 2023-07-18

## 2023-07-17 RX ORDER — LACTULOSE 20 G/30ML
30 SOLUTION ORAL 3 TIMES DAILY
Status: DISCONTINUED | OUTPATIENT
Start: 2023-07-17 | End: 2023-07-20 | Stop reason: HOSPADM

## 2023-07-17 RX ORDER — LORAZEPAM 2 MG/1
4 TABLET ORAL
Status: DISCONTINUED | OUTPATIENT
Start: 2023-07-17 | End: 2023-07-18

## 2023-07-17 RX ORDER — AMOXICILLIN 250 MG
2 CAPSULE ORAL 2 TIMES DAILY
Status: DISCONTINUED | OUTPATIENT
Start: 2023-07-17 | End: 2023-07-18

## 2023-07-17 RX ORDER — POLYETHYLENE GLYCOL 3350 17 G/17G
1 POWDER, FOR SOLUTION ORAL
Status: DISCONTINUED | OUTPATIENT
Start: 2023-07-17 | End: 2023-07-18

## 2023-07-17 RX ORDER — PREDNISOLONE 15 MG/5ML
40 SOLUTION ORAL DAILY
Status: DISCONTINUED | OUTPATIENT
Start: 2023-07-17 | End: 2023-07-17

## 2023-07-17 RX ORDER — LABETALOL HYDROCHLORIDE 5 MG/ML
10 INJECTION, SOLUTION INTRAVENOUS EVERY 4 HOURS PRN
Status: DISCONTINUED | OUTPATIENT
Start: 2023-07-17 | End: 2023-07-20 | Stop reason: HOSPADM

## 2023-07-17 RX ADMIN — SENNOSIDES AND DOCUSATE SODIUM 2 TABLET: 50; 8.6 TABLET ORAL at 06:16

## 2023-07-17 RX ADMIN — PANTOPRAZOLE SODIUM 40 MG: 40 INJECTION, POWDER, FOR SOLUTION INTRAVENOUS at 06:16

## 2023-07-17 RX ADMIN — ONDANSETRON 4 MG: 2 INJECTION INTRAMUSCULAR; INTRAVENOUS at 09:28

## 2023-07-17 RX ADMIN — OCTREOTIDE ACETATE 50 MCG/HR: 200 INJECTION, SOLUTION INTRAVENOUS; SUBCUTANEOUS at 01:47

## 2023-07-17 RX ADMIN — OCTREOTIDE ACETATE 50 MCG: 100 INJECTION, SOLUTION INTRAVENOUS; SUBCUTANEOUS at 02:14

## 2023-07-17 RX ADMIN — PANTOPRAZOLE SODIUM 40 MG: 40 INJECTION, POWDER, FOR SOLUTION INTRAVENOUS at 17:50

## 2023-07-17 RX ADMIN — FOLIC ACID 1 MG: 1 TABLET ORAL at 06:16

## 2023-07-17 RX ADMIN — OXYCODONE HYDROCHLORIDE 5 MG: 5 TABLET ORAL at 17:49

## 2023-07-17 RX ADMIN — SODIUM CHLORIDE, POTASSIUM CHLORIDE, SODIUM LACTATE AND CALCIUM CHLORIDE: 600; 310; 30; 20 INJECTION, SOLUTION INTRAVENOUS at 04:44

## 2023-07-17 RX ADMIN — OXYCODONE HYDROCHLORIDE 10 MG: 10 TABLET ORAL at 06:18

## 2023-07-17 RX ADMIN — OXYCODONE HYDROCHLORIDE 10 MG: 10 TABLET ORAL at 21:25

## 2023-07-17 RX ADMIN — LACTULOSE 30 ML: 20 SOLUTION ORAL at 13:32

## 2023-07-17 RX ADMIN — LACTULOSE 30 ML: 20 SOLUTION ORAL at 17:49

## 2023-07-17 RX ADMIN — Medication 100 MG: at 06:16

## 2023-07-17 RX ADMIN — LACTULOSE 30 ML: 20 SOLUTION ORAL at 10:41

## 2023-07-17 RX ADMIN — ACETAMINOPHEN 650 MG: 325 TABLET, FILM COATED ORAL at 00:45

## 2023-07-17 RX ADMIN — LORAZEPAM 1 MG: 2 INJECTION INTRAMUSCULAR; INTRAVENOUS at 00:04

## 2023-07-17 RX ADMIN — CEFTRIAXONE SODIUM 1000 MG: 10 INJECTION, POWDER, FOR SOLUTION INTRAVENOUS at 13:31

## 2023-07-17 RX ADMIN — THERA TABS 1 TABLET: TAB at 06:16

## 2023-07-17 RX ADMIN — CEFTRIAXONE SODIUM 1000 MG: 10 INJECTION, POWDER, FOR SOLUTION INTRAVENOUS at 02:14

## 2023-07-17 RX ADMIN — SODIUM CHLORIDE, POTASSIUM CHLORIDE, SODIUM LACTATE AND CALCIUM CHLORIDE: 600; 310; 30; 20 INJECTION, SOLUTION INTRAVENOUS at 00:12

## 2023-07-17 ASSESSMENT — ENCOUNTER SYMPTOMS
DOUBLE VISION: 0
PALPITATIONS: 0
DIARRHEA: 0
MYALGIAS: 0
HEADACHES: 0
CHILLS: 0
NECK PAIN: 0
CLAUDICATION: 0
NAUSEA: 1
BRUISES/BLEEDS EASILY: 0
HEMOPTYSIS: 0
COUGH: 0
FEVER: 1
BLOOD IN STOOL: 0
ABDOMINAL PAIN: 1
SPUTUM PRODUCTION: 0
FOCAL WEAKNESS: 0
NERVOUS/ANXIOUS: 0
FLANK PAIN: 0
VOMITING: 1
CONSTIPATION: 0
WEIGHT LOSS: 1
HALLUCINATIONS: 0
HEARTBURN: 0
FEVER: 0
SPEECH CHANGE: 0
ORTHOPNEA: 0
PHOTOPHOBIA: 0
POLYDIPSIA: 0
BLURRED VISION: 0
BACK PAIN: 0
WEIGHT LOSS: 0
WEAKNESS: 0
DIZZINESS: 0
TREMORS: 0

## 2023-07-17 ASSESSMENT — LIFESTYLE VARIABLES
VISUAL DISTURBANCES: NOT PRESENT
ANXIETY: NO ANXIETY (AT EASE)
HAVE PEOPLE ANNOYED YOU BY CRITICIZING YOUR DRINKING: YES
ANXIETY: NO ANXIETY (AT EASE)
NAUSEA AND VOMITING: MILD NAUSEA WITH NO VOMITING
TOTAL SCORE: 1
AUDITORY DISTURBANCES: NOT PRESENT
VISUAL DISTURBANCES: VERY MILD SENSITIVITY
ORIENTATION AND CLOUDING OF SENSORIUM: ORIENTED AND CAN DO SERIAL ADDITIONS
HOW MANY TIMES IN THE PAST YEAR HAVE YOU HAD 5 OR MORE DRINKS IN A DAY: 300
AUDITORY DISTURBANCES: NOT PRESENT
ALCOHOL_USE: YES
VISUAL DISTURBANCES: NOT PRESENT
PAROXYSMAL SWEATS: BARELY PERCEPTIBLE SWEATING. PALMS MOIST
TOTAL SCORE: 4
PAROXYSMAL SWEATS: BARELY PERCEPTIBLE SWEATING. PALMS MOIST
TREMOR: NO TREMOR
PAROXYSMAL SWEATS: NO SWEAT VISIBLE
AGITATION: NORMAL ACTIVITY
HAVE YOU EVER FELT YOU SHOULD CUT DOWN ON YOUR DRINKING: YES
AGITATION: NORMAL ACTIVITY
ORIENTATION AND CLOUDING OF SENSORIUM: ORIENTED AND CAN DO SERIAL ADDITIONS
HEADACHE, FULLNESS IN HEAD: VERY MILD
DOES PATIENT WANT TO TALK TO SOMEONE ABOUT QUITTING: YES
TREMOR: NO TREMOR
ANXIETY: NO ANXIETY (AT EASE)
TOTAL SCORE: 2
EVER HAD A DRINK FIRST THING IN THE MORNING TO STEADY YOUR NERVES TO GET RID OF A HANGOVER: YES
TREMOR: NO TREMOR
AUDITORY DISTURBANCES: NOT PRESENT
CONSUMPTION TOTAL: POSITIVE
ANXIETY: NO ANXIETY (AT EASE)
VISUAL DISTURBANCES: NOT PRESENT
TOTAL SCORE: 4
AGITATION: NORMAL ACTIVITY
AUDITORY DISTURBANCES: NOT PRESENT
PAROXYSMAL SWEATS: NO SWEAT VISIBLE
TOTAL SCORE: 1
TOTAL SCORE: 3
NAUSEA AND VOMITING: NO NAUSEA AND NO VOMITING
NAUSEA AND VOMITING: *
HEADACHE, FULLNESS IN HEAD: NOT PRESENT
AGITATION: NORMAL ACTIVITY
TOTAL SCORE: 4
TOTAL SCORE: 3
VISUAL DISTURBANCES: NOT PRESENT
ORIENTATION AND CLOUDING OF SENSORIUM: ORIENTED AND CAN DO SERIAL ADDITIONS
DOES PATIENT WANT TO STOP DRINKING: YES
TREMOR: NO TREMOR
TREMOR: NO TREMOR
NAUSEA AND VOMITING: MILD NAUSEA WITH NO VOMITING
SUBSTANCE_ABUSE: 0
ORIENTATION AND CLOUDING OF SENSORIUM: ORIENTED AND CAN DO SERIAL ADDITIONS
TOTAL SCORE: 4
NAUSEA AND VOMITING: INTERMITTENT NAUSEA WITH DRY HEAVES
ON A TYPICAL DAY WHEN YOU DRINK ALCOHOL HOW MANY DRINKS DO YOU HAVE: 4
EVER FELT BAD OR GUILTY ABOUT YOUR DRINKING: YES
ORIENTATION AND CLOUDING OF SENSORIUM: ORIENTED AND CAN DO SERIAL ADDITIONS
ORIENTATION AND CLOUDING OF SENSORIUM: ORIENTED AND CAN DO SERIAL ADDITIONS
HEADACHE, FULLNESS IN HEAD: NOT PRESENT
VISUAL DISTURBANCES: NOT PRESENT
HEADACHE, FULLNESS IN HEAD: VERY MILD
AGITATION: NORMAL ACTIVITY
AGITATION: NORMAL ACTIVITY
PAROXYSMAL SWEATS: BARELY PERCEPTIBLE SWEATING. PALMS MOIST
AVERAGE NUMBER OF DAYS PER WEEK YOU HAVE A DRINK CONTAINING ALCOHOL: 7
ANXIETY: NO ANXIETY (AT EASE)
NAUSEA AND VOMITING: NO NAUSEA AND NO VOMITING
ANXIETY: NO ANXIETY (AT EASE)
AUDITORY DISTURBANCES: NOT PRESENT
AUDITORY DISTURBANCES: NOT PRESENT
TREMOR: NO TREMOR
HEADACHE, FULLNESS IN HEAD: NOT PRESENT
HEADACHE, FULLNESS IN HEAD: NOT PRESENT
PAROXYSMAL SWEATS: NO SWEAT VISIBLE

## 2023-07-17 ASSESSMENT — COGNITIVE AND FUNCTIONAL STATUS - GENERAL
MOBILITY SCORE: 24
SUGGESTED CMS G CODE MODIFIER DAILY ACTIVITY: CH
DAILY ACTIVITIY SCORE: 24
SUGGESTED CMS G CODE MODIFIER MOBILITY: CH

## 2023-07-17 ASSESSMENT — PATIENT HEALTH QUESTIONNAIRE - PHQ9
1. LITTLE INTEREST OR PLEASURE IN DOING THINGS: NOT AT ALL
SUM OF ALL RESPONSES TO PHQ9 QUESTIONS 1 AND 2: 0
2. FEELING DOWN, DEPRESSED, IRRITABLE, OR HOPELESS: NOT AT ALL

## 2023-07-17 ASSESSMENT — PAIN DESCRIPTION - PAIN TYPE
TYPE: ACUTE PAIN
TYPE: ACUTE PAIN

## 2023-07-17 NOTE — ED PROVIDER NOTES
ED Provider Note    CHIEF COMPLAINT  Chief Complaint   Patient presents with    Abdominal Pain     BIB [facility] transfer from another hospital. Patient reports diffuse abdominal pain x3 days. Limited PO intake x2. Patient reports last drink 7/15.       EXTERNAL RECORDS REVIEWED  Inpatient Notes [facility] records reviewed: Labs: WBC 14, hemoglobin 9.9, hematocrit 29.3, platelets 125.  BMP: Sodium 131, potassium 3.3, anion gap 12.5, magnesium 1.4 alk phos 230, , ALT 29, total bili 13, lipase 154.  Beta-hCG is negative.  Glucose 129, BUN 10.4 creatinine 0.69, calcium 8.4, CO2 24.8 chloride 97  Moderate ascites with cirrhosis.  Splenomegaly.  Moderate right pleural effusion.      Cholelithiasis.  Ovarian cyst similar to prior studies.    HPI/ROS  LIMITATION TO HISTORY   Select: : None  OUTSIDE HISTORIAN(S):  None    Windy Saldivar is a 42 y.o. female who presents to this ER as a transfer from [facility].  Past medical history as document below.  Chronic alcoholic.  Known liver cirrhosis.  Presenting to sending facility for increasing abdominal pain, p.o. intolerance, nausea, vomiting that has been progressively worsening over the last 3 days.    During their work-up it was noted that the patient had guaiac positive stools and was then slightly anemic with a hemoglobin just under 10.  CT imaging did not show any obstructive findings on review.    At this point the patient continues to have upper abdominal discomfort which is moderate in severity.  Nausea is currently somewhat improved.  She has been kept NPO.    Last alcoholic beverage was yesterday.    PAST MEDICAL HISTORY   has a past medical history of ASTHMA, Gallstone, Hypertension, Low back pain, and PID (acute pelvic inflammatory disease) (05/23/2014).    SURGICAL HISTORY   has a past surgical history that includes IUD Removal (5/25/2014).    FAMILY HISTORY  No family history on file.    SOCIAL HISTORY  Social History     Tobacco Use  "   Smoking status: Every Day     Packs/day: 0.50     Types: Cigarettes     Last attempt to quit: 2013     Years since quittin.7    Smokeless tobacco: Not on file    Tobacco comments:     quit 5 mos ago   Substance and Sexual Activity    Alcohol use: Yes     Comment: 2x week    Drug use: No    Sexual activity: Not on file       CURRENT MEDICATIONS  Home Medications       Reviewed by Med Humphrey (Pharmacy Tech) on 23 at 0006  Med List Status: Partial     Medication Last Dose Status   albuterol (PROVENTIL) 2.5mg/3ml Nebu Soln solution for nebulization FEW DAYS AGO Active   albuterol (VENTOLIN OR PROVENTIL) 108 (90 BASE) MCG/ACT Aero Soln inhalation aerosol 2023 Active   MAGNESIUM-ZINC PO 2023 Active   therapeutic multivitamin-minerals (THERAGRAN-M) Tab 2023 Active                    ALLERGIES  Allergies   Allergen Reactions    Aspirin Shortness of Breath     Shortness of breath, asthma exacerbation, and \"a congested feeling.\"       PHYSICAL EXAM  VITAL SIGNS: /61   Pulse 96   Temp 36.2 °C (97.1 °F) (Temporal)   Resp 18   Ht 1.499 m (4' 11\")   Wt 63.8 kg (140 lb 10.5 oz)   SpO2 92%   BMI 28.41 kg/m²      Pulse ox interpretation: I interpret this pulse ox as normal.  Constitutional: Alert in no apparent distress.  HENT: No signs of trauma, Bilateral external ears normal, Nose normal.   Eyes: Pupils are equal and reactive  Neck: Normal range of motion, No tenderness, Supple  Cardiovascular: Tachycardic, regular rate and rhythm, no murmurs.   Thorax & Lungs: Normal breath sounds, No respiratory distress, No wheezing, No chest tenderness.   Abdomen: Bowel sounds normal, significantly distended with positive ascitic fluid wave.  Mild diffuse tenderness and slight firmness throughout.  Skin: Warm, Dry, No erythema, No rash.   Extremities: Intact distal pulses  Musculoskeletal: Good range of motion in all major joints. No tenderness to palpation or major deformities noted. "   Neurologic: Alert , Normal motor function, Normal sensory function, No focal deficits noted.   Psychiatric: Affect normal, Judgment normal, Mood normal.         DIAGNOSTIC STUDIES / PROCEDURES      LABS  Results for orders placed or performed during the hospital encounter of 07/16/23   COMP METABOLIC PANEL   Result Value Ref Range    Sodium 135 135 - 145 mmol/L    Potassium 3.3 (L) 3.6 - 5.5 mmol/L    Chloride 105 96 - 112 mmol/L    Co2 20 20 - 33 mmol/L    Anion Gap 10.0 7.0 - 16.0    Glucose 102 (H) 65 - 99 mg/dL    Bun 10 8 - 22 mg/dL    Creatinine 0.34 (L) 0.50 - 1.40 mg/dL    Calcium 8.0 (L) 8.5 - 10.5 mg/dL    AST(SGOT) 91 (H) 12 - 45 U/L    ALT(SGPT) 25 2 - 50 U/L    Alkaline Phosphatase 193 (H) 30 - 99 U/L    Total Bilirubin 10.0 (H) 0.1 - 1.5 mg/dL    Albumin 2.7 (L) 3.2 - 4.9 g/dL    Total Protein 7.0 6.0 - 8.2 g/dL    Globulin 4.3 (H) 1.9 - 3.5 g/dL    A-G Ratio 0.6 g/dL   LIPASE   Result Value Ref Range    Lipase 36 11 - 82 U/L   LACTIC ACID   Result Value Ref Range    Lactic Acid 0.8 0.5 - 2.0 mmol/L   AMMONIA   Result Value Ref Range    Ammonia 63 (H) 11 - 45 umol/L   PROTHROMBIN TIME (INR)   Result Value Ref Range    PT 23.0 (H) 12.0 - 14.6 sec    INR 2.10 (H) 0.87 - 1.13   APTT   Result Value Ref Range    APTT 46.2 (H) 24.7 - 36.0 sec   CORRECTED CALCIUM   Result Value Ref Range    Correct Calcium 9.0 8.5 - 10.5 mg/dL   ESTIMATED GFR   Result Value Ref Range    GFR (CKD-EPI) 130 >60 mL/min/1.73 m 2   CBC with Differential   Result Value Ref Range    WBC 12.9 (H) 4.8 - 10.8 K/uL    RBC 2.36 (L) 4.20 - 5.40 M/uL    Hemoglobin 8.2 (L) 12.0 - 16.0 g/dL    Hematocrit 25.2 (L) 37.0 - 47.0 %    .8 (H) 81.4 - 97.8 fL    MCH 34.7 (H) 27.0 - 33.0 pg    MCHC 32.5 32.2 - 35.5 g/dL    RDW 58.4 (H) 35.9 - 50.0 fL    Platelet Count 108 (L) 164 - 446 K/uL    MPV 10.9 9.0 - 12.9 fL    Neutrophils-Polys 88.70 (H) 44.00 - 72.00 %    Lymphocytes 4.30 (L) 22.00 - 41.00 %    Monocytes 5.20 0.00 - 13.40 %     Eosinophils 0.90 0.00 - 6.90 %    Basophils 0.90 0.00 - 1.80 %    Nucleated RBC 0.20 0.00 - 0.20 /100 WBC    Neutrophils (Absolute) 11.44 (H) 1.82 - 7.42 K/uL    Lymphs (Absolute) 0.55 (L) 1.00 - 4.80 K/uL    Monos (Absolute) 0.67 0.00 - 0.85 K/uL    Eos (Absolute) 0.12 0.00 - 0.51 K/uL    Baso (Absolute) 0.12 0.00 - 0.12 K/uL    NRBC (Absolute) 0.03 K/uL    Anisocytosis 1+     Macrocytosis 1+    Comp Metabolic Panel (CMP)   Result Value Ref Range    Sodium 135 135 - 145 mmol/L    Potassium 3.9 3.6 - 5.5 mmol/L    Chloride 104 96 - 112 mmol/L    Co2 24 20 - 33 mmol/L    Anion Gap 7.0 7.0 - 16.0    Glucose 93 65 - 99 mg/dL    Bun 9 8 - 22 mg/dL    Creatinine 0.45 (L) 0.50 - 1.40 mg/dL    Calcium 8.0 (L) 8.5 - 10.5 mg/dL    AST(SGOT) 84 (H) 12 - 45 U/L    ALT(SGPT) 22 2 - 50 U/L    Alkaline Phosphatase 186 (H) 30 - 99 U/L    Total Bilirubin 9.7 (H) 0.1 - 1.5 mg/dL    Albumin 2.6 (L) 3.2 - 4.9 g/dL    Total Protein 6.7 6.0 - 8.2 g/dL    Globulin 4.1 (H) 1.9 - 3.5 g/dL    A-G Ratio 0.6 g/dL   Magnesium   Result Value Ref Range    Magnesium 1.7 1.5 - 2.5 mg/dL   Phosphorus   Result Value Ref Range    Phosphorus 2.3 (L) 2.5 - 4.5 mg/dL   CORRECTED CALCIUM   Result Value Ref Range    Correct Calcium 9.1 8.5 - 10.5 mg/dL   ESTIMATED GFR   Result Value Ref Range    GFR (CKD-EPI) 122 >60 mL/min/1.73 m 2   DIFFERENTIAL MANUAL   Result Value Ref Range    Manual Diff Status PERFORMED    PERIPHERAL SMEAR REVIEW   Result Value Ref Range    Peripheral Smear Review see below    PLATELET ESTIMATE   Result Value Ref Range    Plt Estimation Decreased    MORPHOLOGY   Result Value Ref Range    RBC Morphology Present            COURSE & MEDICAL DECISION MAKING    ED Observation Status? No; Patient does not meet criteria for ED Observation.     INITIAL ASSESSMENT, COURSE AND PLAN  Care Narrative: 42-year-old female presenting the emergency department as a transfer as stated above.  We will proceed with repeat hematologic  evaluation.    DISPOSITION AND DISCUSSIONS  I have discussed management of the patient with the following physicians and NERISSA's: Hospitalist    Discussion of management with other [facility] or appropriate source(s): Pharmacy for medication verification      42-year-old female presenting to the emerged part with above presentation.  Repeat labs as above.  Significant for transaminitis, elevated ammonia and worsening anemia.  Outpatient facility with hemoglobin around 9.  Now at 8.    At this point I have discussed the case with the hospitalist service.  Prior facility labs and imaging reviewed tonight and between myself and the admitting physician.     FINAL DIAGNOSIS  1. Gastrointestinal hemorrhage, unspecified gastrointestinal hemorrhage type    2. Alcoholic cirrhosis of liver with ascites (HCC)    3. Alcohol withdrawal syndrome without complication (HCC)    4. Calculus of gallbladder without cholecystitis without obstruction    5.  Elevated ammonia  6.  Transaminitis with AST predominance  7.  Anemia with acute downtrending       Electronically signed by: Valeriano Mock M.D., 7/16/2023 11:19 PM

## 2023-07-17 NOTE — ASSESSMENT & PLAN NOTE
Moderate right-sided pleural effusion noted on CT  Likely related to cirrhosis  Thoracentesis was done and fluid showed white blood cell 1500 and transudative with low protein and LDH, less likely to be infection, monitoring with chest x-ray and consider Lasix and spironolactone

## 2023-07-17 NOTE — PROGRESS NOTES
4 Eyes Skin Assessment Completed by Dona, RN and VICTORIA Garza.    Head Jaundice  Ears Jaundice  Nose Jaundice  Mouth WDL  Neck Jaundice  Breast/Chest Jaundice  Shoulder Blades WDL  Spine WDL  (R) Arm/Elbow/Hand WDL  (L) Arm/Elbow/Hand WDL  Abdomen Distended  Groin WDL  Scrotum/Coccyx/Buttocks WDL  (R) Leg Jaundice  (L) Leg Jaundice  (R) Heel/Foot/Toe Jaundice  (L) Heel/Foot/Toe Jaundice          Devices In Places Tele Box, Blood Pressure Cuff, and Pulse Ox      Interventions In Place Pillows and Pressure Redistribution Mattress    Possible Skin Injury No    Pictures Uploaded Into Epic N/A  Wound Consult Placed N/A  RN Wound Prevention Protocol Ordered No

## 2023-07-17 NOTE — CONSULTS
"GASTROENTEROLOGY CONSULTATION    PATIENT NAME: Windy Saldivar  : 1980  CSN: [id number]  MRN:  [id number]     CONSULTATION DATE:  2023    PRIMARY CARE PROVIDER:  Pcp Pt States None      REASON FOR CONSULT:  Abdominal pain  HISTORY OF PRESENT ILLNESS:  Windy Saldivar is a 42 y.o. female with past medical history of ongoing alcohol abuse and alcoholic liver disease, cholelithiasis, asthma, chronic lower back pain, pelvic inflammatory disease, who presented 2023 who presented diffuse abdominal pain and distention in the last 3 days, nausea, vomiting, decreased appetite. Last drink was on 7/15. She tried to quit but was scared to stop cold turkey. It  was reported that patient had some bright blood in stool, but she is denying that. She is vomiting as I am talking to her and it is all bile. No blood. She has not had melena. She had one vomit that had some red in it but she said she had taken a red pill.      WBC 14, hemoglobin 9.9.  Total bilirubin 13, lipase 154, , ALT 29.  Beta-hCG is negative.  CT of the abdomen showed moderate ascites, moderate right-sided pleural effusion, splenomegaly.  Cholelithiasis without cholecystitis  Patient states she never had paracentesis or been distended like this.She has never had GI issues. She denies fever, but in the emergency department it was noted that patient has fever of 101.7.         PAST MEDICAL HISTORY:  Past Medical History:   Diagnosis Date    ALC (alcoholic liver cirrhosis) (HCC) 2023    ASTHMA     Gallstone     Hypertension     Low back pain     \"pinched nerve\"    PID (acute pelvic inflammatory disease) 2014       PAST SURGICAL HISTORY:  Past Surgical History:   Procedure Laterality Date    IUD REMOVAL  2014             CURRENT MEDS:  Current Facility-Administered Medications   Medication Dose Route Frequency Provider Last Rate Last Admin    senna-docusate (Pericolace Or Senokot S) 8.6-50 MG per tablet 2 Tablet  2 " Tablet Oral BID Landon Anderson M.D.   2 Tablet at 07/17/23 0616    And    polyethylene glycol/lytes (Miralax) PACKET 1 Packet  1 Packet Oral QDAY PRN Landon Anderson M.D.        And    magnesium hydroxide (Milk Of Magnesia) suspension 30 mL  30 mL Oral QDAY PRN Landon Anderson M.D.        And    bisacodyl (Dulcolax) suppository 10 mg  10 mg Rectal QDAY PRN Landon Anderson M.D.        ondansetron (Zofran) syringe/vial injection 4 mg  4 mg Intravenous Q4HRS PRN Landon Anderson M.D.   4 mg at 07/17/23 0928    ondansetron (Zofran ODT) dispertab 4 mg  4 mg Oral Q4HRS PRN Landon Anderson M.D.        promethazine (Phenergan) tablet 12.5-25 mg  12.5-25 mg Oral Q4HRS PRN Landon Anderson M.D.        promethazine (Phenergan) suppository 12.5-25 mg  12.5-25 mg Rectal Q4HRS PRN Landon Anderson M.D.        prochlorperazine (Compazine) injection 5-10 mg  5-10 mg Intravenous Q4HRS PRN Landon Anderson M.D.        labetalol (Normodyne/Trandate) injection 10 mg  10 mg Intravenous Q4HRS PRN Landon Anderson M.D.        Pharmacy Consult Request ...Pain Management Review 1 Each  1 Each Other PHARMACY TO DOSE Landon Anderson M.D.        oxyCODONE immediate-release (Roxicodone) tablet 5 mg  5 mg Oral Q3HRS PRJANESSA Anderson M.D.        Or    oxyCODONE immediate release (Roxicodone) tablet 10 mg  10 mg Oral Q3HRS PRN Landon Anderson M.D.   10 mg at 07/17/23 0618    Or    morphine 4 MG/ML injection 4 mg  4 mg Intravenous Q3HRS PRN Landon Anderson M.D.        cefTRIAXone (Rocephin) syringe 1,000 mg  1,000 mg Intravenous Q24HRS Landon Anderson M.D.   1,000 mg at 07/17/23 0214    Pharmacy Consult Request   Other PHARMACY TO DOSE Landon Anderson M.D.        pantoprazole (Protonix) injection 40 mg  40 mg Intravenous BID Landon Anderson M.D.   40 mg at 07/17/23 0616    LORazepam (Ativan) tablet 0.5 mg  0.5 mg Oral Q4HRS PRN Landon Anderson M.D.        LORazepam (Ativan) tablet 1 mg  1 mg Oral Q4HRS PRN Landon  RENE Anderson        Or    LORazepam (Ativan) injection 0.5 mg  0.5 mg Intravenous Q4HRS PRN Landon Anderson M.D.        LORazepam (Ativan) tablet 2 mg  2 mg Oral Q2HRS PRN Lnadon Anderson M.D.        Or    LORazepam (Ativan) injection 1 mg  1 mg Intravenous Q2HRS PRN Landon Anderson M.D.        LORazepam (Ativan) tablet 3 mg  3 mg Oral Q HOUR PRN Landon Anderson M.D.        Or    LORazepam (Ativan) injection 1.5 mg  1.5 mg Intravenous Q HOUR PRN Landon Anderson M.D.        LORazepam (Ativan) tablet 4 mg  4 mg Oral Q15 MIN PRN Landon Anderson M.D.        Or    LORazepam (Ativan) injection 2 mg  2 mg Intravenous Q15 MIN PRN Landon Anderson M.D.        thiamine (Vitamin B-1) tablet 100 mg  100 mg Oral DAILY Landon Anderson M.D.   100 mg at 07/17/23 0616    And    multivitamin tablet 1 Tablet  1 Tablet Oral DAILY Landon Anderson M.D.   1 Tablet at 07/17/23 0616    And    folic acid (Folvite) tablet 1 mg  1 mg Oral DAILY Landon Anderson M.D.   1 mg at 07/17/23 0616    acetaminophen (Tylenol) tablet 650 mg  650 mg Oral Q12HRS PRN Landon Anderson M.D.   650 mg at 07/17/23 0045    lactulose 20 GM/30ML solution 30 mL  30 mL Oral TID Landon Anderson M.D.   30 mL at 07/17/23 1041    LORazepam (Ativan) tablet 0.5 mg  0.5 mg Oral Q4HRS PRN Valeriano Mock M.D.        LORazepam (Ativan) tablet 1 mg  1 mg Oral Q4HRS PRN Valeriano Mock M.D.        Or    LORazepam (Ativan) injection 0.5 mg  0.5 mg Intravenous Q4HRS PRN Valeriano Mock M.D.        LORazepam (Ativan) tablet 2 mg  2 mg Oral Q2HRS PRN Valeriano Mock M.D.        Or    LORazepam (Ativan) injection 1 mg  1 mg Intravenous Q2HRS PRN Valeriano Mock M.D.        LORazepam (Ativan) tablet 3 mg  3 mg Oral Q HOUR PRN Valeriano Mock M.D.        Or    LORazepam (Ativan) injection 1.5 mg  1.5 mg Intravenous Q HOUR PRN Valeriano Mock M.D.        LORazepam (Ativan) tablet 4 mg  4 mg Oral Q15 MIN PRN Valeriano Mock M.D.        Or    LORazepam (Ativan)  "injection 2 mg  2 mg Intravenous Q15 MIN PRN Valeriano Mock M.D.        albuterol (Proventil) 2.5mg/3ml nebulizer solution 2.5 mg  2.5 mg Nebulization Q4HRS PRN Landon Anderson M.D.            ALLERGIES:  Allergies   Allergen Reactions    Aspirin Shortness of Breath     Shortness of breath, asthma exacerbation, and \"a congested feeling.\"       SOCIAL HISTORY:  Social History     Socioeconomic History    Marital status:      Spouse name: Not on file    Number of children: Not on file    Years of education: Not on file    Highest education level: Not on file   Occupational History    Not on file   Tobacco Use    Smoking status: Every Day     Packs/day: 0.50     Types: Cigarettes     Last attempt to quit: 2013     Years since quittin.7    Smokeless tobacco: Not on file    Tobacco comments:     quit 5 mos ago   Substance and Sexual Activity    Alcohol use: Yes     Comment: 2x week    Drug use: No    Sexual activity: Not on file   Other Topics Concern    Not on file   Social History Narrative    Not on file     Social Determinants of Health     Financial Resource Strain: Not on file   Food Insecurity: Not on file   Transportation Needs: Not on file   Physical Activity: Not on file   Stress: Not on file   Social Connections: Not on file   Intimate Partner Violence: Not on file   Housing Stability: Not on file       FAMILY HISTORY:  No family history on file.     REVIEW OF SYSTEMS:  General ROS: Negative for - chills, fever, night sweats or weight loss.  HEENT ROS: Negative  Respiratory ROS: Negative for - cough or shortness of breath.  Cardiovascular ROS:  Negative for - chest pain or palpitations.  Gastrointestinal ROS: As per the history of present illness.  Genito-Urinary ROS: Negative  Musculoskeletal ROS: Negative.  Neurological ROS: Negative  Skin ROS: negative  Hematology ROS: negative  Endocrinology ROS: Negative        PHYSICAL EXAM:  VITALS: /76   Pulse 92   Temp 36.5 °C (97.7 °F) " "(Temporal)   Resp 18   Ht 1.499 m (4' 11\")   Wt 63.8 kg (140 lb 10.5 oz)   SpO2 91%   BMI 28.41 kg/m²   GEN:  Windy Saldivar is a 42 y.o. female in vomiting and sweaty  HEENT: Mucous membranes pink and moist.  Sclera anicteric.    NECK:    Neck supple without lymphadenopathy or thyromegaly.  LUNGS: Clear to auscultation posteriorly on left,  decreased breath sound on right  HEART: Regular rate and rhythm. S1 and S2 normal. No murmurs, gallops  ABD:  + BS distended. Positive fluid wave  RECTAL: Not done at this time.  EXT:  Without cyanosis, deformity or pitting edema.  SKIN:  Pink, warm, dry.  NEURO: Grossly intact, A/OR.    LABS:  Recent Labs     07/17/23  0211   WBC 12.9*   .8*     Recent Labs     07/16/23  2335 07/17/23  0211   GLUCOSE 102* 93   BUN 10 9   CO2 20 24     Lab Results   Component Value Date    INR 2.10 (H) 07/16/2023     No components found for: ALT, AST, GGT, ALKPHOS  No results found for: BILINEO      @[employer]([id number])@     @[employer]([id number])@       IMPRESSION/PLAN:  Liver decompensation due to alcohol abuse. MELD is 23  Alcohol abuse   Positive occult  - no signs of acute bleeding. D/c octreotide  Alcoholic hepatitis - steroid reportedly given but I do not see them ordered. Would hold off on them until SBP ruled out  Ascites - need paracentesis. If no SBP or relief with paracentesis, EGD for diagnosis of nausea, vomiting and pain  Pleural effusion - likely from liver disease on right side. thoracentesis  Anemia - order iron studies and retic count. If iron def need to evaluate and at some point she will need EGD to make sure no varices as preventative measure. She is not having a variceal bleed at this time        Carrie Hoyos M.D.  Gastroenterology      "

## 2023-07-17 NOTE — ED NOTES
Med rec partially complete per patient  Allergies reviewed  [facility]    Patient states she is also on a daily inhaler and a nasal spray but unsure of names

## 2023-07-17 NOTE — CARE PLAN
The patient is Watcher - Medium risk of patient condition declining or worsening    Shift Goals  Clinical Goals: CIWA, monitor for signs of bleeding  Patient Goals: safe detox  Family Goals: na    Progress made toward(s) clinical / shift goals:    Problem: Optimal Care for Alcohol Withdrawal  Goal: Optimal Care for the alcohol withdrawal patient  Outcome: Progressing  Note: Patient is scoring low in CIWA, patient is AxO 4, seizure precautions in place.      Problem: Lifestyle Changes  Goal: Patient's ability to identify lifestyle changes and available resources to help reduce recurrence of condition will improve  Outcome: Progressing  Note: Patient states she wants to quit drinking.        Patient is not progressing towards the following goals:

## 2023-07-17 NOTE — PROGRESS NOTES
Bedside report received from Dona BECERRA RN. Pt A&Ox4. Sinus rhythm on the monitor. On room air. No current complaints of pain. POC discussed with pt. Pt verbalizes understanding. Call light and belongings within reach. Bed locked and in lowest position. Alarm and fall precautions in place.

## 2023-07-17 NOTE — ASSESSMENT & PLAN NOTE
Thiamine and Ativan per CIWA protocol  Fall, aspiration, seizure precaution  Alcohol cessation counseling

## 2023-07-17 NOTE — H&P
Hospital Medicine History & Physical Note    Date of Service  7/16/2023    Primary Care Physician  Pcp Pt States None    Code Status  Full code    Chief Complaint  Chief Complaint   Patient presents with    Abdominal Pain     BIB [facility] transfer from another hospital. Patient reports diffuse abdominal pain x3 days. Limited PO intake x2. Patient reports last drink 7/15.       History of Presenting Illness  Windy Saldivar is a 42 y.o. female with past medical history of ongoing alcohol abuse and alcoholic liver disease, cholelithiasis, asthma, chronic lower back pain, pelvic inflammatory disease, who presented 7/16/2023 with is a transfer from outside facility, where she presented with diffuse abdominal pain and distention in the last 3 days, nausea, vomiting, decreased appetite.  Last drink was on 7/15  And it was reported that patient had some bright blood in stool, but not melena, however patient stated  she had blood in urine.    Blood work at outside facility showed WBC 14, hemoglobin 9.9.  Total bilirubin 13, lipase 154, , ALT 29.  Beta-hCG is negative.  CT of the abdomen showed moderate ascites, moderate right-sided pleural effusion, splenomegaly.  Cholelithiasis without cholecystitis  Patient states she never had paracentesis.  In the emergency department it was noted that patient has fever one 101.7.  Upon questioning, she also did have some dry cough going on in the last 3 days.  Ordered chest x-ray and COVID-19/influenza/RSV screen.  I discussed the plan of care with patient and ERP .    Review of Systems  Review of Systems   Constitutional:  Positive for fever and malaise/fatigue. Negative for chills and weight loss.   HENT:  Negative for ear pain, hearing loss and tinnitus.    Eyes:  Negative for blurred vision, double vision and photophobia.   Respiratory:  Negative for cough, hemoptysis and sputum production.    Cardiovascular:  Negative for chest pain, palpitations and orthopnea.  "  Gastrointestinal:  Positive for abdominal pain, nausea and vomiting. Negative for heartburn.   Genitourinary:  Negative for dysuria, flank pain, frequency and hematuria.   Musculoskeletal:  Negative for back pain, joint pain and neck pain.   Skin:  Negative for itching and rash.   Neurological:  Negative for tremors, speech change, focal weakness and headaches.   Endo/Heme/Allergies:  Negative for environmental allergies and polydipsia. Does not bruise/bleed easily.   Psychiatric/Behavioral:  Negative for hallucinations and substance abuse. The patient is not nervous/anxious.        Past Medical History   has a past medical history of ASTHMA, Gallstone, Hypertension, Low back pain, and PID (acute pelvic inflammatory disease) (2014).    Surgical History   has a past surgical history that includes IUD Removal (2014).     Family History  family history is not on file.   Family history reviewed with patient. There is no family history that is pertinent to the chief complaint.     Social History   reports that she has been smoking cigarettes. She has been smoking an average of .5 packs per day. She does not have any smokeless tobacco history on file. She reports current alcohol use. She reports that she does not use drugs.    Allergies  Allergies   Allergen Reactions    Aspirin Shortness of Breath     Shortness of breath, asthma exacerbation, and \"a congested feeling.\"       Medications  Prior to Admission Medications   Prescriptions Last Dose Informant Patient Reported? Taking?   albuterol (PROVENTIL) 2.5mg/3ml Nebu Soln solution for nebulization  Patient Yes No   Si.5 mg by Nebulization route every four hours as needed for Shortness of Breath.   albuterol (PROVENTIL) 2.5mg/3ml Nebu Soln solution for nebulization   No No   Sig: 3 mL by Nebulization route every four hours as needed for Shortness of Breath for up to 22 doses. With one nebulizer machine   albuterol (PROVENTIL) 2.5mg/3ml Nebu Soln solution " for nebulization   No No   Sig: 3 mL by Nebulization route every four hours as needed for Shortness of Breath.   albuterol (VENTOLIN OR PROVENTIL) 108 (90 BASE) MCG/ACT Aero Soln inhalation aerosol   No No   Sig: Inhale 2 Puffs by mouth every four hours as needed for Shortness of Breath.      Facility-Administered Medications: None       Physical Exam  Temp:  [37 °C (98.6 °F)-38.7 °C (101.7 °F)] 38.7 °C (101.7 °F)  Pulse:  [123] 123  Resp:  [15-21] 15  BP: (108-112)/(58-60) 108/60  SpO2:  [93 %-94 %] 94 %  Blood Pressure: 108/60   Temperature: (!) 38.7 °C (101.7 °F)   Pulse: (!) 123   Respiration: 15   Pulse Oximetry: 94 %       Physical Exam  Vitals and nursing note reviewed.   Constitutional:       General: She is not in acute distress.     Appearance: Normal appearance. She is ill-appearing.   HENT:      Head: Normocephalic and atraumatic.      Nose: Nose normal.      Mouth/Throat:      Mouth: Mucous membranes are moist.   Eyes:      Extraocular Movements: Extraocular movements intact.      Pupils: Pupils are equal, round, and reactive to light.   Cardiovascular:      Rate and Rhythm: Normal rate and regular rhythm.   Pulmonary:      Effort: Pulmonary effort is normal.      Breath sounds: Examination of the right-lower field reveals decreased breath sounds. Decreased breath sounds present.   Abdominal:      General: Abdomen is flat. There is distension.      Tenderness: There is generalized abdominal tenderness. There is no guarding or rebound.   Musculoskeletal:         General: No swelling or deformity. Normal range of motion.      Cervical back: Normal range of motion and neck supple.   Skin:     General: Skin is warm and dry.   Neurological:      General: No focal deficit present.      Mental Status: She is alert and oriented to person, place, and time.   Psychiatric:         Mood and Affect: Mood normal.         Behavior: Behavior normal.         Laboratory:          No results for input(s): ALTSGPT,  ASTSGOT, ALKPHOSPHAT, TBILIRUBIN, DBILIRUBIN, GAMMAGT, AMYLASE, LIPASE, ALB, PREALBUMIN, GLUCOSE in the last 72 hours.      No results for input(s): NTPROBNP in the last 72 hours.      No results for input(s): TROPONINT in the last 72 hours.    Imaging:  No orders to display           Assessment/Plan:  Justification for Admission Status  I anticipate this patient will require at least two midnights for appropriate medical management, necessitating inpatient admission because of GI bleed, SBP    Patient will need a Telemetry bed on MEDICAL service .  The need is secondary to GI bleed, SBP.    * Acute blood loss anemia- (present on admission)  Assessment & Plan  + Positive bedside guaiac test at outside facility reported, possible blood in stool versus in urine  Plan: Cover with Protonix, octreotide and ceftriaxone  Per H&H, transfuse RBC for hemoglobin 7.0 and below  Consider GI consult      ALC (alcoholic liver cirrhosis) (HCC) and alcoholic hepatitis  Assessment & Plan  Liver cirrhosis is seen on CT  Total bilirubin 13, , ALT 29  PT 20.1  MELD score 23    Alcoholic hepatitis:  [facility] discrimination score 42.  Therefore, we will start prednisolone  Alcohol cessation counseling    Alcohol dependence (HCC)  Assessment & Plan  Thiamine and Ativan per CIWA protocol  Fall, aspiration, seizure precaution  Alcohol cessation counseling    Ascites  Assessment & Plan  Probably secondary to liver cirrhosis/portal hypertension, rule out SBP given pain leukocytosis and fever  Paracentesis pending  Covered with ceftriaxone    Pleural effusion  Assessment & Plan  Moderate right-sided pleural effusion noted on CT  Ordered therapeutic/diagnostic thoracentesis, transthoracic echo    SIRS (systemic inflammatory response syndrome) (HCC)  Assessment & Plan  SIRS criteria identified on my evaluation include:  Fever, with temperature greater than 100.9 deg F, Tachycardia, with heart rate greater than 90 BPM and Leukocytosis, with  WBC greater than 12,000  Pending further work-up: COVID-19/RSV/influenza, paracentesis, paracentesis, blood culture, UA/urine culture        VTE prophylaxis: SCDs/TEDs

## 2023-07-17 NOTE — ASSESSMENT & PLAN NOTE
SIRS criteria identified on my evaluation include:  Fever, with temperature greater than 100.9 deg F, Tachycardia, with heart rate greater than 90 BPM and Leukocytosis, with WBC greater than 12,000  Pending further work-up: COVID-19/RSV/influenza, paracentesis, paracentesis, blood culture, UA/urine culture  Possible SBP, not able to do paracentesis  We will continue ceftriaxone at this time

## 2023-07-17 NOTE — ASSESSMENT & PLAN NOTE
+ Positive bedside guaiac test however patient denies significant black stool or melena and no hematemesis  Discontinue octreotide and continue PPI  Continue monitoring hemoglobin every 8 hours and transfusion if needed  GI was consulted, EGD showed varices  No signs of bleeding, hemoglobin is a stable

## 2023-07-17 NOTE — PROGRESS NOTES
Hospital Medicine Daily Progress Note    Date of Service  7/17/2023    Chief Complaint  Windy Saldivar is a 42 y.o. female admitted 7/16/2023 with abdominal pain    Hospital Course    42-year-old female with history of alcoholism, asthma and pelvic inflammatory disease who presented 716 with abdominal pain.  Initially patient was presented to out side facility for diffuse abdominal pain with chills and nausea with vomiting and decreased appetite also noticing more yellowish.  Patient has history of heavy drinking and alcoholic liver disease.  On admission patient was found to have leukocytosis 14 hemoglobin 9.9 with total protein 13 and elevated liver enzymes.  CT for abdomen showed moderate ascites with moderate right-sided pleural effusion.  Ceftriaxone was initiated for possible SBP, initially patient was started with octreotide and PPI for possible GI bleeding.  Hemoglobin has been around 8, octreotide was discontinued later.  Paracentesis was not able to be performed due to lack of fluid however thoracentesis was done.  Case was discussed with the GI who recommended to continue antibiotics, and possible EGD during the hospitalization.  \  \  Interval Problem Update  -Evaluated examined the patient at bedside, patient still having diffuse abdominal pain, she is alert oriented x4  -Continue lactulose  -Continue ceftriaxone 2 g for possible SBP, not able to get paracentesis  -Continue monitoring hemoglobin, no need for octreotide and continue PPI, case was discussed with GI and possible EGD during this hospitalization.  -Case was discussed with the patient and answered all her questions.    I have discussed this patient's plan of care and discharge plan at IDT rounds today with Case Management, Nursing, Nursing leadership, and other members of the IDT team.    Consultants/Specialty  GI    Code Status  Full Code    Disposition  The patient is not medically cleared for discharge to home or a post-acute  facility.  Anticipate discharge to: home with organized home healthcare and close outpatient follow-up    I have placed the appropriate orders for post-discharge needs.    Review of Systems  Review of Systems   Constitutional:  Positive for malaise/fatigue and weight loss. Negative for chills and fever.   HENT:  Negative for ear pain, hearing loss and tinnitus.    Eyes:  Negative for blurred vision, double vision and photophobia.   Respiratory:  Negative for cough and hemoptysis.    Cardiovascular:  Negative for chest pain, palpitations, orthopnea and claudication.   Gastrointestinal:  Positive for abdominal pain, nausea and vomiting. Negative for blood in stool, constipation and diarrhea.   Genitourinary:  Negative for dysuria, frequency and urgency.   Musculoskeletal:  Negative for myalgias and neck pain.   Skin:  Negative for rash.   Neurological:  Negative for dizziness, speech change and weakness.        Physical Exam  Temp:  [36.2 °C (97.1 °F)-38.7 °C (101.7 °F)] 36.3 °C (97.4 °F)  Pulse:  [] 86  Resp:  [15-21] 18  BP: (103-117)/(58-76) 112/68  SpO2:  [91 %-94 %] 93 %    Physical Exam  Constitutional:       General: She is in acute distress.      Appearance: She is ill-appearing.   HENT:      Head: Normocephalic and atraumatic.   Eyes:      General: Scleral icterus present.   Cardiovascular:      Rate and Rhythm: Normal rate.      Heart sounds: No murmur heard.  Pulmonary:      Effort: No respiratory distress.      Breath sounds: Rales present. No wheezing.   Abdominal:      General: There is no distension.      Palpations: Abdomen is soft.      Tenderness: There is abdominal tenderness. There is no guarding.   Musculoskeletal:         General: No swelling or deformity.      Right lower leg: No edema.      Left lower leg: No edema.   Skin:     Coloration: Skin is jaundiced and pale.      Findings: Bruising and lesion present. No rash.   Neurological:      General: No focal deficit present.      Mental  Status: She is oriented to person, place, and time. Mental status is at baseline.      Cranial Nerves: No cranial nerve deficit.      Sensory: No sensory deficit.      Motor: No weakness.      Gait: Gait normal.         Fluids    Intake/Output Summary (Last 24 hours) at 7/17/2023 1746  Last data filed at 7/17/2023 1400  Gross per 24 hour   Intake 0 ml   Output 200 ml   Net -200 ml       Laboratory  Recent Labs     07/17/23  0211 07/17/23  1156   WBC 12.9*  --    RBC 2.36*  --    HEMOGLOBIN 8.2* 8.9*   HEMATOCRIT 25.2* 26.6*   .8*  --    MCH 34.7*  --    MCHC 32.5  --    RDW 58.4*  --    PLATELETCT 108*  --    MPV 10.9  --      Recent Labs     07/16/23  2335 07/17/23  0211   SODIUM 135 135   POTASSIUM 3.3* 3.9   CHLORIDE 105 104   CO2 20 24   GLUCOSE 102* 93   BUN 10 9   CREATININE 0.34* 0.45*   CALCIUM 8.0* 8.0*     Recent Labs     07/16/23 2335   APTT 46.2*   INR 2.10*               Imaging  EC-ECHOCARDIOGRAM COMPLETE W/O CONT   Final Result      DX-CHEST-LIMITED (1 VIEW)   Final Result      1.  Lower lung volumes with increased underinflation atelectasis superimposed pulmonary edema or other airspace disease is possible   2.  Possible small RIGHT pleural effusion      US-PARACENTESIS, ABD WITH IMAGING    (Results Pending)   US-THORACENTESIS PUNCTURE RIGHT    (Results Pending)   DX-CHEST-PORTABLE (1 VIEW)    (Results Pending)        Assessment/Plan  * ALC (alcoholic liver cirrhosis) (HCC) and alcoholic hepatitis  Assessment & Plan  Heavy drinking for years   liver cirrhosis is seen on CT  Elevated bilirubin 13 and liver enzymes with INR  MELD score around 23  Holding steroids for possible infection at this time  Follow-up with GI, appreciate recommendations, possible EGD  Discontinue octreotide and continue PPI  Lactulose    Abdominal pain  Assessment & Plan  With distention  Possible SBP at this time we will continue ceftriaxone  Not able to get paracentesis  Labs daily    Acute blood loss anemia- (present  on admission)  Assessment & Plan  + Positive bedside guaiac test however patient denies significant black stool or melena and no hematemesis  Discontinue octreotide and continue PPI  Continue monitoring hemoglobin every 8 hours and transfusion if needed  GI was consulted, possible EGD during the hospitalization        Alcohol dependence (HCC)  Assessment & Plan  Thiamine and Ativan per [facility] protocol  Fall, aspiration, seizure precaution  Alcohol cessation counseling    Ascites  Assessment & Plan  With abdominal pain  Likely SBP, continue ceftriaxone  No significant fluid for paracentesis    SIRS (systemic inflammatory response syndrome) (HCC)  Assessment & Plan  SIRS criteria identified on my evaluation include:  Fever, with temperature greater than 100.9 deg F, Tachycardia, with heart rate greater than 90 BPM and Leukocytosis, with WBC greater than 12,000  Pending further work-up: COVID-19/RSV/influenza, paracentesis, paracentesis, blood culture, UA/urine culture  Possible SBP, not able to do paracentesis  We will continue ceftriaxone at this time    Pleural effusion  Assessment & Plan  Moderate right-sided pleural effusion noted on CT  Likely related to cirrhosis  Paracentesis was done         VTE prophylaxis: SCDs/TEDs and pharmacologic prophylaxis contraindicated due to possible GI bleeding    I have performed a physical exam and reviewed and updated ROS and Plan today (7/17/2023). In review of yesterday's note (7/16/2023), there are no changes except as documented above.    Greater than 51 minutes spent prepping to see patient (e.g. review of tests) obtaining and/or reviewing separately obtained history. Performing a medically appropriate examination and/ evaluation.  Counseling and educating the patient/family/caregiver.  Ordering medications, tests, or procedures.  Referring and communicating with other health care professionals.  Documenting clinical information in EPIC.  Independently interpreting results  and communicating results to patient/family/caregiver.  Care coordination

## 2023-07-17 NOTE — DISCHARGE PLANNING
Care Transition Team Assessment    RN RAS spoke with patient at bedside. Role of CM explained. Demographic info verified. Patient states she lives with  and mother in [place], [place]. Independent with all ADL's. No DME or outpatient services. Patient states her  will p/u upon discharge.     Information Source  Orientation Level: Oriented X4  Information Given By: Patient  Who is responsible for making decisions for patient? : Patient    Elopement Risk  Legal Hold: No  Ambulatory or Self Mobile in Wheelchair: Yes  Disoriented: No  Psychiatric Symptoms: None  History of Wandering: No  Elopement this Admit: No  Vocalizing Wanting to Leave: No  Displays Behaviors, Body Language Wanting to Leave: No-Not at Risk for Elopement  Elopement Risk: Not at Risk for Elopement    Interdisciplinary Discharge Planning  Lives with - Patient's Self Care Capacity: Spouse, Unrelated Adult  Patient or legal guardian wants to designate a caregiver: No  Support Systems: Family Member(s), Spouse / Significant Other  Housing / Facility: [address]  Able to Return to Previous ADL's: Yes  Mobility Issues: No  Prior Services: Home-Independent  Assistance Needed: No  Durable Medical Equipment: Not Applicable    Discharge Preparedness  What is your plan after discharge?: Home with help  What are your discharge supports?: Spouse, Parent  Prior Functional Level: Independent with Activities of Daily Living  Difficulity with ADLs: None  Difficulity with IADLs: None    Functional Assesment  Prior Functional Level: Independent with Activities of Daily Living    Vision / Hearing Impairment  Vision Impairment : Yes  Right Eye Vision: Impaired, Wears Glasses  Left Eye Vision: Impaired, Wears Glasses  Hearing Impairment : No    Domestic Abuse  Have you ever been the victim of abuse or violence?: No  Physical Abuse or Sexual Abuse: No  Verbal Abuse or Emotional Abuse: No  Possible Abuse/Neglect Reported to:: Not Applicable    Anticipated  Discharge Information  Discharge Disposition: Discharged to home/self care (01)

## 2023-07-17 NOTE — ED TRIAGE NOTES
Chief Complaint   Patient presents with    Abdominal Pain     BIB [facility] transfer from another hospital. Patient reports diffuse abdominal pain x3 days. Limited PO intake x2. Patient reports last drink 7/15.     Vitals:    07/16/23 2208   BP: 112/58   Resp: 18   Temp: 37 °C (98.6 °F)

## 2023-07-17 NOTE — CARE PLAN
Problem: Pain - Standard  Goal: Alleviation of pain or a reduction in pain to the patient’s comfort goal  Note: Assess and monitor for pain. Provide pharmacological and non-pharmacological interventions as appropriate. Re-evaluate and continue to monitor.        Problem: Optimal Care for Alcohol Withdrawal  Goal: Optimal Care for the alcohol withdrawal patient  Outcome: Progressing  Note: CIWA protocol in place      The patient is Stable - Low risk of patient condition declining or worsening    Shift Goals: Pain management   Clinical Goals: CIWA and monitor for any signs of bleeding  Patient Goals: Pain medications  Family Goals: na    Progress made toward(s) clinical / shift goals:  CIWA 2-4    Patient is not progressing towards the following goals: Patient requesting Dilaudid, no Dilaudid on the MAR, patient requesting to speak with Dr. Quintanilla, Dr. Quintanilla notified.

## 2023-07-17 NOTE — ASSESSMENT & PLAN NOTE
Heavy drinking for years   liver cirrhosis is seen on CT  Elevated bilirubin 13 and liver enzymes with INR  MELD score around 23  Holding steroids for possible infection at this time  Follow-up with GI, appreciate recommendations,   EGD showed esophageal varices  Discontinue octreotide and continue PPI  Lactulose 3 times a day  Start with small dose of Lasix consider lactone and close monitoring  Check ultrasound

## 2023-07-17 NOTE — PROGRESS NOTES
Assumed care of patient, received bedside report from Dona KESSLER. Patient is A&O X 4. Pain 4/10, on RA. On tele monitor, . POC discussed with patient. Patient verbalized understanding. All questions answered. Call light within reach and fall precautions in place. Bed locked and in lowest position.

## 2023-07-18 ENCOUNTER — ANESTHESIA EVENT (OUTPATIENT)
Dept: SURGERY | Facility: MEDICAL CENTER | Age: 43
DRG: 433 | End: 2023-07-18
Payer: COMMERCIAL

## 2023-07-18 LAB
ABO + RH BLD: NORMAL
ABO GROUP BLD: NORMAL
ALBUMIN SERPL BCP-MCNC: 2.7 G/DL (ref 3.2–4.9)
ALBUMIN/GLOB SERPL: 0.6 G/DL
ALP SERPL-CCNC: 184 U/L (ref 30–99)
ALT SERPL-CCNC: 24 U/L (ref 2–50)
ANION GAP SERPL CALC-SCNC: 11 MMOL/L (ref 7–16)
ANISOCYTOSIS BLD QL SMEAR: ABNORMAL
AST SERPL-CCNC: 66 U/L (ref 12–45)
BASOPHILS # BLD AUTO: 0.9 % (ref 0–1.8)
BASOPHILS # BLD: 0.08 K/UL (ref 0–0.12)
BILIRUB SERPL-MCNC: 7.3 MG/DL (ref 0.1–1.5)
BLD GP AB SCN SERPL QL: NORMAL
BUN SERPL-MCNC: 10 MG/DL (ref 8–22)
BURR CELLS BLD QL SMEAR: NORMAL
CALCIUM ALBUM COR SERPL-MCNC: 9.4 MG/DL (ref 8.5–10.5)
CALCIUM SERPL-MCNC: 8.4 MG/DL (ref 8.5–10.5)
CHLORIDE SERPL-SCNC: 102 MMOL/L (ref 96–112)
CO2 SERPL-SCNC: 22 MMOL/L (ref 20–33)
CREAT SERPL-MCNC: 0.26 MG/DL (ref 0.5–1.4)
CRP SERPL HS-MCNC: 6.39 MG/DL (ref 0–0.75)
EOSINOPHIL # BLD AUTO: 0.15 K/UL (ref 0–0.51)
EOSINOPHIL NFR BLD: 1.8 % (ref 0–6.9)
ERYTHROCYTE [DISTWIDTH] IN BLOOD BY AUTOMATED COUNT: 57.9 FL (ref 35.9–50)
FUNGUS SPEC FUNGUS STN: NORMAL
GFR SERPLBLD CREATININE-BSD FMLA CKD-EPI: 139 ML/MIN/1.73 M 2
GLOBULIN SER CALC-MCNC: 4.2 G/DL (ref 1.9–3.5)
GLUCOSE SERPL-MCNC: 109 MG/DL (ref 65–99)
GRAM STN SPEC: NORMAL
HCT VFR BLD AUTO: 25.7 % (ref 37–47)
HCT VFR BLD AUTO: 26 % (ref 37–47)
HCT VFR BLD AUTO: 26.5 % (ref 37–47)
HGB BLD-MCNC: 8.5 G/DL (ref 12–16)
HGB BLD-MCNC: 8.6 G/DL (ref 12–16)
HGB BLD-MCNC: 8.7 G/DL (ref 12–16)
LYMPHOCYTES # BLD AUTO: 0.81 K/UL (ref 1–4.8)
LYMPHOCYTES NFR BLD: 9.6 % (ref 22–41)
MACROCYTES BLD QL SMEAR: ABNORMAL
MAGNESIUM SERPL-MCNC: 1.7 MG/DL (ref 1.5–2.5)
MANUAL DIFF BLD: NORMAL
MCH RBC QN AUTO: 35.4 PG (ref 27–33)
MCHC RBC AUTO-ENTMCNC: 32.8 G/DL (ref 32.2–35.5)
MCV RBC AUTO: 107.7 FL (ref 81.4–97.8)
MONOCYTES # BLD AUTO: 0.29 K/UL (ref 0–0.85)
MONOCYTES NFR BLD AUTO: 3.5 % (ref 0–13.4)
MORPHOLOGY BLD-IMP: NORMAL
NEUTROPHILS # BLD AUTO: 7.07 K/UL (ref 1.82–7.42)
NEUTROPHILS NFR BLD: 84.2 % (ref 44–72)
NRBC # BLD AUTO: 0.02 K/UL
NRBC BLD-RTO: 0.2 /100 WBC (ref 0–0.2)
PLATELET # BLD AUTO: 131 K/UL (ref 164–446)
PLATELET BLD QL SMEAR: NORMAL
PMV BLD AUTO: 10.8 FL (ref 9–12.9)
POIKILOCYTOSIS BLD QL SMEAR: NORMAL
POTASSIUM SERPL-SCNC: 4 MMOL/L (ref 3.6–5.5)
PROT SERPL-MCNC: 6.9 G/DL (ref 6–8.2)
RBC # BLD AUTO: 2.46 M/UL (ref 4.2–5.4)
RBC BLD AUTO: PRESENT
RH BLD: NORMAL
RHODAMINE-AURAMINE STN SPEC: NORMAL
SIGNIFICANT IND 70042: NORMAL
SITE SITE: NORMAL
SODIUM SERPL-SCNC: 135 MMOL/L (ref 135–145)
SOURCE SOURCE: NORMAL
WBC # BLD AUTO: 8.4 K/UL (ref 4.8–10.8)

## 2023-07-18 PROCEDURE — 770020 HCHG ROOM/CARE - TELE (206)

## 2023-07-18 PROCEDURE — 700102 HCHG RX REV CODE 250 W/ 637 OVERRIDE(OP): Performed by: EMERGENCY MEDICINE

## 2023-07-18 PROCEDURE — 86901 BLOOD TYPING SEROLOGIC RH(D): CPT

## 2023-07-18 PROCEDURE — 86850 RBC ANTIBODY SCREEN: CPT

## 2023-07-18 PROCEDURE — 85025 COMPLETE CBC W/AUTO DIFF WBC: CPT

## 2023-07-18 PROCEDURE — 99233 SBSQ HOSP IP/OBS HIGH 50: CPT | Performed by: INTERNAL MEDICINE

## 2023-07-18 PROCEDURE — 85018 HEMOGLOBIN: CPT | Mod: 91

## 2023-07-18 PROCEDURE — 700102 HCHG RX REV CODE 250 W/ 637 OVERRIDE(OP): Performed by: INTERNAL MEDICINE

## 2023-07-18 PROCEDURE — 86140 C-REACTIVE PROTEIN: CPT

## 2023-07-18 PROCEDURE — 99232 SBSQ HOSP IP/OBS MODERATE 35: CPT | Performed by: NURSE PRACTITIONER

## 2023-07-18 PROCEDURE — 86900 BLOOD TYPING SEROLOGIC ABO: CPT

## 2023-07-18 PROCEDURE — A9270 NON-COVERED ITEM OR SERVICE: HCPCS | Performed by: INTERNAL MEDICINE

## 2023-07-18 PROCEDURE — 700111 HCHG RX REV CODE 636 W/ 250 OVERRIDE (IP): Performed by: INTERNAL MEDICINE

## 2023-07-18 PROCEDURE — 83735 ASSAY OF MAGNESIUM: CPT

## 2023-07-18 PROCEDURE — 85007 BL SMEAR W/DIFF WBC COUNT: CPT

## 2023-07-18 PROCEDURE — C9113 INJ PANTOPRAZOLE SODIUM, VIA: HCPCS | Performed by: INTERNAL MEDICINE

## 2023-07-18 PROCEDURE — 85014 HEMATOCRIT: CPT | Mod: 91

## 2023-07-18 PROCEDURE — 80053 COMPREHEN METABOLIC PANEL: CPT

## 2023-07-18 PROCEDURE — A9270 NON-COVERED ITEM OR SERVICE: HCPCS | Performed by: EMERGENCY MEDICINE

## 2023-07-18 PROCEDURE — 36415 COLL VENOUS BLD VENIPUNCTURE: CPT

## 2023-07-18 RX ADMIN — LACTULOSE 30 ML: 20 SOLUTION ORAL at 04:46

## 2023-07-18 RX ADMIN — PANTOPRAZOLE SODIUM 40 MG: 40 INJECTION, POWDER, FOR SOLUTION INTRAVENOUS at 17:28

## 2023-07-18 RX ADMIN — LORAZEPAM 0.5 MG: 0.5 TABLET ORAL at 00:21

## 2023-07-18 RX ADMIN — THERA TABS 1 TABLET: TAB at 04:46

## 2023-07-18 RX ADMIN — Medication 100 MG: at 04:46

## 2023-07-18 RX ADMIN — SENNOSIDES AND DOCUSATE SODIUM 2 TABLET: 50; 8.6 TABLET ORAL at 04:46

## 2023-07-18 RX ADMIN — LACTULOSE 30 ML: 20 SOLUTION ORAL at 12:19

## 2023-07-18 RX ADMIN — CEFTRIAXONE SODIUM 2000 MG: 10 INJECTION, POWDER, FOR SOLUTION INTRAVENOUS at 05:06

## 2023-07-18 RX ADMIN — LACTULOSE 30 ML: 20 SOLUTION ORAL at 17:28

## 2023-07-18 RX ADMIN — OXYCODONE HYDROCHLORIDE 10 MG: 10 TABLET ORAL at 19:42

## 2023-07-18 RX ADMIN — FOLIC ACID 1 MG: 1 TABLET ORAL at 04:46

## 2023-07-18 RX ADMIN — PANTOPRAZOLE SODIUM 40 MG: 40 INJECTION, POWDER, FOR SOLUTION INTRAVENOUS at 04:49

## 2023-07-18 ASSESSMENT — ENCOUNTER SYMPTOMS
DEPRESSION: 0
BLOOD IN STOOL: 0
COUGH: 0
ABDOMINAL PAIN: 1
WEIGHT LOSS: 0
DIARRHEA: 0
VOMITING: 0
MYALGIAS: 0
BACK PAIN: 0
SHORTNESS OF BREATH: 0
PALPITATIONS: 0
SPEECH CHANGE: 0
DIZZINESS: 0
BLURRED VISION: 0
HEMOPTYSIS: 0
FEVER: 0
HEARTBURN: 0
ORTHOPNEA: 0
DOUBLE VISION: 0
CHILLS: 0
WEAKNESS: 0
CONSTIPATION: 0
NAUSEA: 0
NECK PAIN: 0
CLAUDICATION: 0
PHOTOPHOBIA: 0

## 2023-07-18 ASSESSMENT — LIFESTYLE VARIABLES
ORIENTATION AND CLOUDING OF SENSORIUM: ORIENTED AND CAN DO SERIAL ADDITIONS
NAUSEA AND VOMITING: NO NAUSEA AND NO VOMITING
NAUSEA AND VOMITING: NO NAUSEA AND NO VOMITING
AGITATION: NORMAL ACTIVITY
NAUSEA AND VOMITING: NO NAUSEA AND NO VOMITING
NAUSEA AND VOMITING: NO NAUSEA AND NO VOMITING
PAROXYSMAL SWEATS: NO SWEAT VISIBLE
TOTAL SCORE: 1
TREMOR: NO TREMOR
AUDITORY DISTURBANCES: NOT PRESENT
ANXIETY: NO ANXIETY (AT EASE)
VISUAL DISTURBANCES: NOT PRESENT
PAROXYSMAL SWEATS: NO SWEAT VISIBLE
ANXIETY: *
HEADACHE, FULLNESS IN HEAD: VERY MILD
ORIENTATION AND CLOUDING OF SENSORIUM: ORIENTED AND CAN DO SERIAL ADDITIONS
TREMOR: NO TREMOR
HEADACHE, FULLNESS IN HEAD: NOT PRESENT
SUBSTANCE_ABUSE: 1
PAROXYSMAL SWEATS: NO SWEAT VISIBLE
ORIENTATION AND CLOUDING OF SENSORIUM: ORIENTED AND CAN DO SERIAL ADDITIONS
AUDITORY DISTURBANCES: NOT PRESENT
AGITATION: NORMAL ACTIVITY
HEADACHE, FULLNESS IN HEAD: NOT PRESENT
AUDITORY DISTURBANCES: NOT PRESENT
TOTAL SCORE: 0
TREMOR: NO TREMOR
VISUAL DISTURBANCES: NOT PRESENT
PAROXYSMAL SWEATS: NO SWEAT VISIBLE
AGITATION: NORMAL ACTIVITY
HEADACHE, FULLNESS IN HEAD: NOT PRESENT
TREMOR: NO TREMOR
TOTAL SCORE: 6
ANXIETY: NO ANXIETY (AT EASE)
AUDITORY DISTURBANCES: NOT PRESENT
TOTAL SCORE: MILD ITCHING, PINS AND NEEDLES SENSATION, BURNING OR NUMBNESS
AGITATION: NORMAL ACTIVITY
ANXIETY: MILDLY ANXIOUS
VISUAL DISTURBANCES: NOT PRESENT
TOTAL SCORE: 0
ORIENTATION AND CLOUDING OF SENSORIUM: ORIENTED AND CAN DO SERIAL ADDITIONS
VISUAL DISTURBANCES: NOT PRESENT

## 2023-07-18 ASSESSMENT — PAIN DESCRIPTION - PAIN TYPE
TYPE: ACUTE PAIN

## 2023-07-18 ASSESSMENT — FIBROSIS 4 INDEX: FIB4 SCORE: 7.13

## 2023-07-18 NOTE — PROGRESS NOTES
Bedside report received from Sherrie BECERRA RN. Pt A&Ox4. No current complaints of pain. On room air. Sinus rhythm on the monitor. POC discussed with pt. Pt verbalizes understanding. Call light and belongings within reach. Bed locked and in lowest position. Alarm and fall precautions in place.

## 2023-07-18 NOTE — PROGRESS NOTES
..Gastroenterology Progress Note               Author:  ANGELINE Gordillo Date & Time Created: 7/18/2023 9:11 AM       Patient ID:  Name:             Windy Saldivar  YOB: 1980  Age:                 42 y.o.  female  MRN:               [id number]        Medical Decision Making, by Problem:  Active Hospital Problems    Diagnosis     SIRS (systemic inflammatory response syndrome) (HCC) [R65.10]     Pleural effusion [J90]     Ascites [R18.8]     Alcohol dependence (HCC) [F10.20]     ALC (alcoholic liver cirrhosis) (HCC) and alcoholic hepatitis [K70.30]     Abdominal pain [R10.9]     Acute blood loss anemia [K92.2]            Presenting Chief Complaint:  Abdominal pain      Interval History:  7/18/2023: Patient reports feeling better but still nauseous and had a normal BM this morning. Also reports that her hematuria is resolving. VSS, afebrile.   Per patient and imaging note, there was not enough ascitic fluid to drain. Discussed with Dr. Quintanilla, the ascites reported under body fluids is actually pleural fluid.   Patient continues on ceftriaxone.     Hospital Medications:  Current Facility-Administered Medications   Medication Dose Frequency Provider Last Rate Last Admin    senna-docusate (Pericolace Or Senokot S) 8.6-50 MG per tablet 2 Tablet  2 Tablet BID Landon Anderson M.D.   2 Tablet at 07/18/23 0446    And    polyethylene glycol/lytes (Miralax) PACKET 1 Packet  1 Packet QDAY PRN Landon Anderson M.D.        And    magnesium hydroxide (Milk Of Magnesia) suspension 30 mL  30 mL QDAY PRN Landon Anderson M.D.        And    bisacodyl (Dulcolax) suppository 10 mg  10 mg QDAY PRN Landon Anderson M.D.        ondansetron (Zofran) syringe/vial injection 4 mg  4 mg Q4HRS PRJANESSA Anderson M.D.   4 mg at 07/17/23 0928    ondansetron (Zofran ODT) dispertab 4 mg  4 mg Q4HRS PRN Landon Anderson M.D.        promethazine (Phenergan) tablet 12.5-25 mg  12.5-25 mg Q4HRS PRN Landon  RENE Anderson        promethazine (Phenergan) suppository 12.5-25 mg  12.5-25 mg Q4HRS PRN Landon Anderson M.D.        prochlorperazine (Compazine) injection 5-10 mg  5-10 mg Q4HRS PRN Landon Anderson M.D.        labetalol (Normodyne/Trandate) injection 10 mg  10 mg Q4HRS PRN Landon Anderson M.D.        Pharmacy Consult Request ...Pain Management Review 1 Each  1 Each PHARMACY TO DOSE Landon Anderson M.D.        oxyCODONE immediate-release (Roxicodone) tablet 5 mg  5 mg Q3HRS PRN Landon Anderson M.D.   5 mg at 07/17/23 1749    Or    oxyCODONE immediate release (Roxicodone) tablet 10 mg  10 mg Q3HRS PRN Landon Anderson M.D.   10 mg at 07/17/23 2125    Or    morphine 4 MG/ML injection 4 mg  4 mg Q3HRS PRN Landon Anderson M.D.        Pharmacy Consult Request   PHARMACY TO DOSE Landon Anderson M.D.        pantoprazole (Protonix) injection 40 mg  40 mg BID Landon Anderson M.D.   40 mg at 07/18/23 0449    LORazepam (Ativan) tablet 0.5 mg  0.5 mg Q4HRS PRN Landon Anderson M.D.        LORazepam (Ativan) tablet 1 mg  1 mg Q4HRS PRN Landon Anderson M.D.        Or    LORazepam (Ativan) injection 0.5 mg  0.5 mg Q4HRS PRN Landon Anderson M.D.        LORazepam (Ativan) tablet 2 mg  2 mg Q2HRS PRN Landon Anderson M.D.        Or    LORazepam (Ativan) injection 1 mg  1 mg Q2HRS PRN Landon Anderson M.D.        LORazepam (Ativan) tablet 3 mg  3 mg Q HOUR PRN Landon Kuharevic, M.D.        Or    LORazepam (Ativan) injection 1.5 mg  1.5 mg Q HOUR PRN Landon Anderson M.D.        LORazepam (Ativan) tablet 4 mg  4 mg Q15 MIN PRN Landon Anderson M.D.        Or    LORazepam (Ativan) injection 2 mg  2 mg Q15 MIN PRN Landon Anderson M.D.        thiamine (Vitamin B-1) tablet 100 mg  100 mg DAILY Landon Anderson M.D.   100 mg at 07/18/23 0446    And    multivitamin tablet 1 Tablet  1 Tablet DAILY Landon Anderson M.D.   1 Tablet at 07/18/23 0446    And    folic acid (Folvite) tablet 1 mg  1 mg DAILY Landon  RENE Anderson   1 mg at 07/18/23 0446    acetaminophen (Tylenol) tablet 650 mg  650 mg Q12HRS PRN Landon Anderson M.D.   650 mg at 07/17/23 0045    lactulose 20 GM/30ML solution 30 mL  30 mL TID Landon Anderson M.D.   30 mL at 07/18/23 0446    cefTRIAXone (Rocephin) syringe 2,000 mg  2,000 mg Q24HRS Arturo Quintanilla M.D.   2,000 mg at 07/18/23 0506    LORazepam (Ativan) tablet 0.5 mg  0.5 mg Q4HRS PRN Valeriano Mock M.D.   0.5 mg at 07/18/23 0021    LORazepam (Ativan) tablet 1 mg  1 mg Q4HRS PRN Valeriano Mock M.D.        Or    LORazepam (Ativan) injection 0.5 mg  0.5 mg Q4HRS PRN Valeriano Mock M.D.        LORazepam (Ativan) tablet 2 mg  2 mg Q2HRS PRN Valeriano Mock M.D.        Or    LORazepam (Ativan) injection 1 mg  1 mg Q2HRS PRN Valeriano Mock M.D.        LORazepam (Ativan) tablet 3 mg  3 mg Q HOUR PRN Valeriano Mock M.D.        Or    LORazepam (Ativan) injection 1.5 mg  1.5 mg Q HOUR PRN Valeriano Mock M.D.        LORazepam (Ativan) tablet 4 mg  4 mg Q15 MIN PRN Valeriano Mock M.D.        Or    LORazepam (Ativan) injection 2 mg  2 mg Q15 MIN PRN Valeriano Mock M.D.        albuterol (Proventil) 2.5mg/3ml nebulizer solution 2.5 mg  2.5 mg Q4HRS PRN Landon Anderson M.D.       Last reviewed on 7/17/2023  3:56 PM by Med Lawson       Review of Systems:  Review of Systems   Constitutional:  Positive for malaise/fatigue. Negative for fever.   HENT:  Negative for hearing loss.    Eyes:  Negative for blurred vision.   Respiratory:  Negative for cough and shortness of breath.    Cardiovascular:  Positive for leg swelling. Negative for chest pain.   Gastrointestinal:  Positive for abdominal pain. Negative for blood in stool, constipation, diarrhea, heartburn, melena, nausea and vomiting.   Genitourinary:  Positive for hematuria.   Musculoskeletal:  Negative for back pain.   Neurological:  Negative for dizziness and weakness.   Psychiatric/Behavioral:  Positive for substance abuse. Negative  "for depression.          Vital signs:  Weight/BMI: Body mass index is 28.76 kg/m².  /74   Pulse 82   Temp 36.4 °C (97.6 °F) (Temporal)   Resp 16   Ht 1.499 m (4' 11\")   Wt 64.6 kg (142 lb 6.7 oz)   SpO2 91%   Vitals:    07/17/23 2314 07/18/23 0019 07/18/23 0338 07/18/23 0743   BP: 100/62  107/68 106/74   Pulse: 82  78 82   Resp: 18  18 16   Temp:    36.4 °C (97.6 °F)   TempSrc: Temporal  Temporal Temporal   SpO2: 95%  93% 91%   Weight:  64.6 kg (142 lb 6.7 oz)     Height:         Oxygen Therapy:  Pulse Oximetry: 91 %, O2 (LPM): 0, O2 Delivery Device: None - Room Air    Intake/Output Summary (Last 24 hours) at 7/18/2023 0911  Last data filed at 7/17/2023 1400  Gross per 24 hour   Intake 0 ml   Output 200 ml   Net -200 ml         Physical Exam:  Physical Exam  Vitals and nursing note reviewed.   Constitutional:       General: She is not in acute distress.     Appearance: Normal appearance. She is ill-appearing.   HENT:      Head: Normocephalic and atraumatic.      Right Ear: External ear normal.      Left Ear: External ear normal.      Nose: Nose normal.      Mouth/Throat:      Mouth: Mucous membranes are moist.      Pharynx: Oropharynx is clear.   Eyes:      General: Scleral icterus present.   Cardiovascular:      Rate and Rhythm: Normal rate and regular rhythm.      Pulses: Normal pulses.      Heart sounds: Normal heart sounds.   Pulmonary:      Effort: Pulmonary effort is normal. No respiratory distress.      Breath sounds: Normal breath sounds.   Abdominal:      General: Bowel sounds are normal. There is distension.      Palpations: Abdomen is soft.      Tenderness: There is no abdominal tenderness.      Comments: Rounded, firm, +spider nevi   Musculoskeletal:         General: Normal range of motion.      Cervical back: Normal range of motion.      Right lower leg: Edema present.      Left lower leg: Edema present.   Skin:     General: Skin is warm and dry.      Capillary Refill: Capillary refill takes " less than 2 seconds.      Coloration: Skin is jaundiced.   Neurological:      Mental Status: She is alert and oriented to person, place, and time.   Psychiatric:         Mood and Affect: Mood normal.         Behavior: Behavior normal.             Labs:  Recent Labs     07/16/23 2335 07/17/23 0211 07/18/23 0105   SODIUM 135 135 135   POTASSIUM 3.3* 3.9 4.0   CHLORIDE 105 104 102   CO2 20 24 22   BUN 10 9 10   CREATININE 0.34* 0.45* 0.26*   MAGNESIUM  --  1.7 1.7   PHOSPHORUS  --  2.3*  --    CALCIUM 8.0* 8.0* 8.4*     Recent Labs     07/16/23 2335 07/17/23 0211 07/17/23 2012 07/18/23 0105   ALTSGPT 25 22  --  24   ASTSGOT 91* 84*  --  66*   ALKPHOSPHAT 193* 186*  --  184*   TBILIRUBIN 10.0* 9.7*  --  7.3*   DBILIRUBIN  --   --  4.9*  --    LIPASE 36  --   --   --    GLUCOSE 102* 93  --  109*     Recent Labs     07/16/23 2335 07/17/23 0211 07/18/23 0105   WBC  --  12.9* 8.4   NEUTSPOLYS  --  88.70* 84.20*   LYMPHOCYTES  --  4.30* 9.60*   MONOCYTES  --  5.20 3.50   EOSINOPHILS  --  0.90 1.80   BASOPHILS  --  0.90 0.90   ASTSGOT 91* 84* 66*   ALTSGPT 25 22 24   ALKPHOSPHAT 193* 186* 184*   TBILIRUBIN 10.0* 9.7* 7.3*     Recent Labs     07/16/23 2335 07/17/23 0211 07/17/23 0211 07/17/23  1156 07/17/23 2012 07/18/23 0105   RBC  --  2.36*  --   --   --  2.46*   HEMOGLOBIN  --  8.2*   < > 8.9* 8.9* 8.7*   HEMATOCRIT  --  25.2*   < > 26.6* 27.7* 26.5*   PLATELETCT  --  108*  --   --   --  131*   PROTHROMBTM 23.0*  --   --   --   --   --    APTT 46.2*  --   --   --   --   --    INR 2.10*  --   --   --   --   --    IRON  --   --   --   --  142  --    FERRITIN  --   --   --   --  399.0*  --    TOTIRONBC  --   --   --   --  see below  --     < > = values in this interval not displayed.     Recent Results (from the past 24 hour(s))   EKG    Collection Time: 07/17/23  9:16 AM   Result Value Ref Range    Report       [facility]    Test Date:  2023-07-17  Pt Name:    ZEE MILLAN                Department:  183  MRN:        [id number]                      Room:       [facility]  Gender:     Female                       Technician: ISRAEL  :        1980                   Requested By:CARLEE POND  Order #:    [id number]                    Reading MD: Alcides Salguero MD    Measurements  Intervals                                Axis  Rate:       95                           P:          5  AR:         142                          QRS:        -1  QRSD:       86                           T:          12  QT:         369  QTc:        464    Interpretive Statements  Sinus rhythm  Probable anteroseptal infarct, old  No previous ECG available for comparison  Electronically Signed On 2023 18:14:10 PDT by Alcides Salguero MD     CoV-2, Flu A/B, And RSV by PCR ([employer])    Collection Time: 23 10:44 AM    Specimen: Nasopharyngeal; Respirate   Result Value Ref Range    Influenza virus A RNA Negative Negative    Influenza virus B, PCR Negative Negative    RSV, PCR Negative Negative    SARS-CoV-2 by PCR NotDetected     SARS-CoV-2 Source NP Swab    HEMOGLOBIN AND HEMATOCRIT    Collection Time: 23 11:56 AM   Result Value Ref Range    Hemoglobin 8.9 (L) 12.0 - 16.0 g/dL    Hematocrit 26.6 (L) 37.0 - 47.0 %   Fluid Cell Count w/Diff    Collection Time: 23  4:45 PM   Result Value Ref Range    Fluid Type Ascites     Color-Body Fluid Yellow     Character-Body Fluid Cloudy     Total RBC Count 2000 cells/uL    FL Total Nucleated Cells 1522 cells/uL    Polys 81 %    Lymphs 3 %    Fl Mono Macrophages 14 %    Fl Lining Cells 2    Fluid Culture with Gram Stain    Collection Time: 23  4:45 PM    Specimen: Peritoneal Fluid; Body Fluid   Result Value Ref Range    Significant Indicator NEG     Source BF     Site PERITONEAL FLUID     Culture Result -     Gram Stain Result -    FLUID PH    Collection Time: 23  4:45 PM   Result Value Ref Range    Fluid Type Ascites     Ph Misc 8.00    FLUID LDH    Collection Time: 23   4:45 PM   Result Value Ref Range    Body Fluid  U/L   FLUID GLUCOSE    Collection Time: 07/17/23  4:45 PM   Result Value Ref Range    Glucose, Fluid 110 mg/dL   FLUID AMYLASE    Collection Time: 07/17/23  4:45 PM   Result Value Ref Range    Body Fluid Amylase 15 U/L   AFB CULTURE    Collection Time: 07/17/23  4:45 PM    Specimen: Thoracentesis Fluid; Body Fluid   Result Value Ref Range    Significant Indicator NEG     Source BF     Site PERITONEAL FLUID     Culture Result -     AFB Smear Results -    FUNGAL CULTURE    Collection Time: 07/17/23  4:45 PM    Specimen: Thoracentesis Fluid; Body Fluid   Result Value Ref Range    Significant Indicator NEG     Source BF     Site PERITONEAL FLUID     Culture Result -     Fungal Smear Results No fungal elements seen.    Cytology    Collection Time: 07/17/23  4:45 PM   Result Value Ref Range    Cytology Reg See Path Report    Fungal Smear    Collection Time: 07/17/23  4:45 PM    Specimen: Body Fluid   Result Value Ref Range    Significant Indicator NEG     Source BF     Site PERITONEAL FLUID     Fungal Smear Results No fungal elements seen.    Urinalysis    Collection Time: 07/17/23  7:50 PM    Specimen: Urine, Clean Catch   Result Value Ref Range    Color Orange (A)     Character Clear     Specific Gravity See comment <1.035    Micro Urine Req Microscopic    URINE MICROSCOPIC (W/UA)    Collection Time: 07/17/23  7:50 PM   Result Value Ref Range    WBC 0-2 /hpf    RBC 0-2 /hpf    Bacteria Few (A) None /hpf    Epithelial Cells Few /hpf    Mucous Threads Few /hpf    Hyaline Cast 0-2 /lpf   HEMOGLOBIN AND HEMATOCRIT    Collection Time: 07/17/23  8:12 PM   Result Value Ref Range    Hemoglobin 8.9 (L) 12.0 - 16.0 g/dL    Hematocrit 27.7 (L) 37.0 - 47.0 %   IRON/TOTAL IRON BIND    Collection Time: 07/17/23  8:12 PM   Result Value Ref Range    Iron 142 40 - 170 ug/dL    Total Iron Binding see below 250 - 450 ug/dL    Unsat Iron Binding <17 (L) 110 - 370 ug/dL    % Saturation  see below 15 - 55 %   FERRITIN    Collection Time: 07/17/23  8:12 PM   Result Value Ref Range    Ferritin 399.0 (H) 10.0 - 291.0 ng/mL   VITAMIN B12    Collection Time: 07/17/23  8:12 PM   Result Value Ref Range    Vitamin B12 -True Cobalamin 1268 (H) 211 - 911 pg/mL   RETICULOCYTES COUNT    Collection Time: 07/17/23  8:12 PM   Result Value Ref Range    Reticulocyte Count 4.8 (H) 0.8 - 2.6 %    Retic, Absolute 0.12 0.04 - 0.12 M/uL    Imm. Reticulocyte Fraction 20.9 (H) 2.6 - 16.1 %    Retic Hgb Equivalent 37.6 (H) 29.0 - 35.0 pg/cell   BILIRUBIN DIRECT    Collection Time: 07/17/23  8:12 PM   Result Value Ref Range    Direct Bilirubin 4.9 (H) 0.1 - 0.5 mg/dL   CBC WITH DIFFERENTIAL    Collection Time: 07/18/23  1:05 AM   Result Value Ref Range    WBC 8.4 4.8 - 10.8 K/uL    RBC 2.46 (L) 4.20 - 5.40 M/uL    Hemoglobin 8.7 (L) 12.0 - 16.0 g/dL    Hematocrit 26.5 (L) 37.0 - 47.0 %    .7 (H) 81.4 - 97.8 fL    MCH 35.4 (H) 27.0 - 33.0 pg    MCHC 32.8 32.2 - 35.5 g/dL    RDW 57.9 (H) 35.9 - 50.0 fL    Platelet Count 131 (L) 164 - 446 K/uL    MPV 10.8 9.0 - 12.9 fL    Neutrophils-Polys 84.20 (H) 44.00 - 72.00 %    Lymphocytes 9.60 (L) 22.00 - 41.00 %    Monocytes 3.50 0.00 - 13.40 %    Eosinophils 1.80 0.00 - 6.90 %    Basophils 0.90 0.00 - 1.80 %    Nucleated RBC 0.20 0.00 - 0.20 /100 WBC    Neutrophils (Absolute) 7.07 1.82 - 7.42 K/uL    Lymphs (Absolute) 0.81 (L) 1.00 - 4.80 K/uL    Monos (Absolute) 0.29 0.00 - 0.85 K/uL    Eos (Absolute) 0.15 0.00 - 0.51 K/uL    Baso (Absolute) 0.08 0.00 - 0.12 K/uL    NRBC (Absolute) 0.02 K/uL    Anisocytosis 2+ (A)     Macrocytosis 2+ (A)    Comp Metabolic Panel    Collection Time: 07/18/23  1:05 AM   Result Value Ref Range    Sodium 135 135 - 145 mmol/L    Potassium 4.0 3.6 - 5.5 mmol/L    Chloride 102 96 - 112 mmol/L    Co2 22 20 - 33 mmol/L    Anion Gap 11.0 7.0 - 16.0    Glucose 109 (H) 65 - 99 mg/dL    Bun 10 8 - 22 mg/dL    Creatinine 0.26 (L) 0.50 - 1.40 mg/dL    Calcium  8.4 (L) 8.5 - 10.5 mg/dL    AST(SGOT) 66 (H) 12 - 45 U/L    ALT(SGPT) 24 2 - 50 U/L    Alkaline Phosphatase 184 (H) 30 - 99 U/L    Total Bilirubin 7.3 (H) 0.1 - 1.5 mg/dL    Albumin 2.7 (L) 3.2 - 4.9 g/dL    Total Protein 6.9 6.0 - 8.2 g/dL    Globulin 4.2 (H) 1.9 - 3.5 g/dL    A-G Ratio 0.6 g/dL   MAGNESIUM    Collection Time: 07/18/23  1:05 AM   Result Value Ref Range    Magnesium 1.7 1.5 - 2.5 mg/dL   CRP QUANTITIVE (NON-CARDIAC)    Collection Time: 07/18/23  1:05 AM   Result Value Ref Range    Stat C-Reactive Protein 6.39 (H) 0.00 - 0.75 mg/dL   CORRECTED CALCIUM    Collection Time: 07/18/23  1:05 AM   Result Value Ref Range    Correct Calcium 9.4 8.5 - 10.5 mg/dL   ESTIMATED GFR    Collection Time: 07/18/23  1:05 AM   Result Value Ref Range    GFR (CKD-EPI) 139 >60 mL/min/1.73 m 2   DIFFERENTIAL MANUAL    Collection Time: 07/18/23  1:05 AM   Result Value Ref Range    Manual Diff Status PERFORMED    PERIPHERAL SMEAR REVIEW    Collection Time: 07/18/23  1:05 AM   Result Value Ref Range    Peripheral Smear Review see below    MORPHOLOGY    Collection Time: 07/18/23  1:05 AM   Result Value Ref Range    RBC Morphology Present     Poikilocytosis 2+     Echinocytes 2+    PLATELET ESTIMATE    Collection Time: 07/18/23  1:05 AM   Result Value Ref Range    Plt Estimation Decreased        Radiology Review:  US-PARACENTESIS, ABD WITH IMAGING   Final Result      Small volume of ascites present, not adequate for safe paracentesis      US-THORACENTESIS PUNCTURE RIGHT   Final Result      1. Ultrasound guided right sided therapeutic thoracentesis.      2. 300 mL of fluid withdrawn..      DX-CHEST-PORTABLE (1 VIEW)   Final Result      No evidence of pneumothorax postthoracentesis.      EC-ECHOCARDIOGRAM COMPLETE W/O CONT   Final Result      DX-CHEST-LIMITED (1 VIEW)   Final Result      1.  Lower lung volumes with increased underinflation atelectasis superimposed pulmonary edema or other airspace disease is possible   2.   Possible small RIGHT pleural effusion            MDM (Data Review):   -Records reviewed and summarized in current documentation  -I personally reviewed and interpreted the laboratory results  -I personally reviewed the radiology images    Assessment/Recommendations:  IMPRESSION/PLAN:  Liver decompensation due to alcohol abuse. MELD is 23  Alcohol abuse   Positive occult  - no signs of acute bleeding. D/c octreotide  Alcoholic hepatitis - steroid reportedly given but I do not see them ordered. Remains on ceftriaxone, cannot rule in or out SBP due to not enough ascitic fluid for paracentesis    Pleural effusion - likely from liver disease on right side. Thoracentesis completed 7/17, 300 ml out consistent with transudative effusion  Anemia - Appropriate retic response, significantly iron deficient   Need EGD to make sure no varices as preventative measure. She is not having a variceal bleed at this time  May eat today  NPO at midnight  EGD tomorrow     Plan of care discussed with patient, Dr. Quintanilla, and Dr. Melendez    Core Quality Measures   Reviewed items::  Labs, Medications and Radiology reports reviewed

## 2023-07-18 NOTE — CARE PLAN
The patient is Stable - Low risk of patient condition declining or worsening    Shift Goals  Clinical Goals: [place]; monitor VS; safety  Patient Goals: pain mgmt  Family Goals: cristian; n/a at this time    Progress made toward(s) clinical / shift goals:  progressing     Problem: Pain - Standard  Goal: Alleviation of pain or a reduction in pain to the patient’s comfort goal  Outcome: Progressing     Problem: Optimal Care for Alcohol Withdrawal  Goal: Optimal Care for the alcohol withdrawal patient  Outcome: Progressing     Problem: Seizure Precautions  Goal: Implementation of seizure precautions  Outcome: Progressing     Problem: Risk for Aspiration  Goal: Patient's risk for aspiration will be absent or decrease  Outcome: Progressing     Problem: Bowel Elimination  Goal: Establish and maintain regular bowel function  Outcome: Progressing     Problem: Gastrointestinal Irritability  Goal: Nausea and vomiting will be absent or improve  Outcome: Progressing  Goal: Diarrhea will be absent or improved  Outcome: Progressing       Patient is not progressing towards the following goals:  Problem: Psychosocial  Goal: Patient's level of anxiety will decrease  Outcome: Not Met

## 2023-07-18 NOTE — PROGRESS NOTES
MONITOR SUMMARY:    RHYTHM: SR  HEART RATE: 80-87bpm  ECTOPY:  (r)PVC  MEASUREMENT: .14/.08/.41

## 2023-07-18 NOTE — ASSESSMENT & PLAN NOTE
With distention  Possible SBP at this time we will continue ceftriaxone  Not able to get paracentesis  Labs daily  Start with Lasix  Order ultrasound for liver

## 2023-07-18 NOTE — CARE PLAN
Problem: Pain - Standard  Goal: Alleviation of pain or a reduction in pain to the patient’s comfort goal  Outcome: Progressing  Flowsheets (Taken 7/18/2023 7230)  Pain Rating Scale (NPRS): 1  Note: Patient wanting Dialudid prn, educated on medication regimen not ordered, Dr. Quintanilla notified      Problem: Optimal Care for Alcohol Withdrawal  Goal: Optimal Care for the alcohol withdrawal patient  Outcome: Progressing  [facility]      The patient is Stable - Low risk of patient condition declining or worsening    Shift Goals: Pain control  Clinical Goals: [facility] protocol  Patient Goals: Plan  Family Goals: cristian; n/a at this time    Progress made toward(s) clinical / shift goals:  Pain of 1    Patient is not progressing towards the following goals: Dr. Quintanilla notified regarding patient's request

## 2023-07-19 ENCOUNTER — ANESTHESIA (OUTPATIENT)
Dept: SURGERY | Facility: MEDICAL CENTER | Age: 43
DRG: 433 | End: 2023-07-19
Payer: COMMERCIAL

## 2023-07-19 ENCOUNTER — APPOINTMENT (OUTPATIENT)
Dept: RADIOLOGY | Facility: MEDICAL CENTER | Age: 43
DRG: 433 | End: 2023-07-19
Attending: INTERNAL MEDICINE
Payer: COMMERCIAL

## 2023-07-19 PROBLEM — I85.00 ESOPHAGEAL VARICES (HCC): Status: ACTIVE | Noted: 2023-07-19

## 2023-07-19 LAB
ALBUMIN SERPL BCP-MCNC: 2.5 G/DL (ref 3.2–4.9)
ALBUMIN/GLOB SERPL: 0.6 G/DL
ALP SERPL-CCNC: 172 U/L (ref 30–99)
ALT SERPL-CCNC: 30 U/L (ref 2–50)
ANION GAP SERPL CALC-SCNC: 10 MMOL/L (ref 7–16)
AST SERPL-CCNC: 84 U/L (ref 12–45)
BASOPHILS # BLD AUTO: 0.8 % (ref 0–1.8)
BASOPHILS # BLD: 0.06 K/UL (ref 0–0.12)
BILIRUB SERPL-MCNC: 4.9 MG/DL (ref 0.1–1.5)
BUN SERPL-MCNC: 6 MG/DL (ref 8–22)
CALCIUM ALBUM COR SERPL-MCNC: 9.2 MG/DL (ref 8.5–10.5)
CALCIUM SERPL-MCNC: 8 MG/DL (ref 8.5–10.5)
CHLORIDE SERPL-SCNC: 100 MMOL/L (ref 96–112)
CO2 SERPL-SCNC: 22 MMOL/L (ref 20–33)
CREAT SERPL-MCNC: 0.34 MG/DL (ref 0.5–1.4)
EOSINOPHIL # BLD AUTO: 0.25 K/UL (ref 0–0.51)
EOSINOPHIL NFR BLD: 3.4 % (ref 0–6.9)
ERYTHROCYTE [DISTWIDTH] IN BLOOD BY AUTOMATED COUNT: 56.3 FL (ref 35.9–50)
GFR SERPLBLD CREATININE-BSD FMLA CKD-EPI: 130 ML/MIN/1.73 M 2
GLOBULIN SER CALC-MCNC: 4.4 G/DL (ref 1.9–3.5)
GLUCOSE SERPL-MCNC: 93 MG/DL (ref 65–99)
HAPTOGLOB SERPL-MCNC: 21 MG/DL (ref 30–200)
HCG UR QL: NEGATIVE
HCT VFR BLD AUTO: 26 % (ref 37–47)
HGB BLD-MCNC: 8.5 G/DL (ref 12–16)
IMM GRANULOCYTES # BLD AUTO: 0.11 K/UL (ref 0–0.11)
IMM GRANULOCYTES NFR BLD AUTO: 1.5 % (ref 0–0.9)
INR PPP: 1.9 (ref 0.87–1.13)
LYMPHOCYTES # BLD AUTO: 1.64 K/UL (ref 1–4.8)
LYMPHOCYTES NFR BLD: 22.6 % (ref 22–41)
MAGNESIUM SERPL-MCNC: 1.6 MG/DL (ref 1.5–2.5)
MCH RBC QN AUTO: 35 PG (ref 27–33)
MCHC RBC AUTO-ENTMCNC: 32.7 G/DL (ref 32.2–35.5)
MCV RBC AUTO: 107 FL (ref 81.4–97.8)
MONOCYTES # BLD AUTO: 0.56 K/UL (ref 0–0.85)
MONOCYTES NFR BLD AUTO: 7.7 % (ref 0–13.4)
NEUTROPHILS # BLD AUTO: 4.64 K/UL (ref 1.82–7.42)
NEUTROPHILS NFR BLD: 64 % (ref 44–72)
NRBC # BLD AUTO: 0 K/UL
NRBC BLD-RTO: 0 /100 WBC (ref 0–0.2)
PHOSPHATE SERPL-MCNC: 3.5 MG/DL (ref 2.5–4.5)
PLATELET # BLD AUTO: 132 K/UL (ref 164–446)
PMV BLD AUTO: 10.4 FL (ref 9–12.9)
POTASSIUM SERPL-SCNC: 3 MMOL/L (ref 3.6–5.5)
PROCALCITONIN SERPL-MCNC: 0.29 NG/ML
PROT SERPL-MCNC: 6.9 G/DL (ref 6–8.2)
PROTHROMBIN TIME: 21.3 SEC (ref 12–14.6)
RBC # BLD AUTO: 2.43 M/UL (ref 4.2–5.4)
SODIUM SERPL-SCNC: 132 MMOL/L (ref 135–145)
WBC # BLD AUTO: 7.3 K/UL (ref 4.8–10.8)

## 2023-07-19 PROCEDURE — 160002 HCHG RECOVERY MINUTES (STAT): Performed by: INTERNAL MEDICINE

## 2023-07-19 PROCEDURE — 160202 HCHG ENDO MINUTES - 1ST 30 MINS LEVEL 3: Performed by: INTERNAL MEDICINE

## 2023-07-19 PROCEDURE — 81025 URINE PREGNANCY TEST: CPT

## 2023-07-19 PROCEDURE — A9270 NON-COVERED ITEM OR SERVICE: HCPCS | Performed by: INTERNAL MEDICINE

## 2023-07-19 PROCEDURE — 160009 HCHG ANES TIME/MIN: Performed by: INTERNAL MEDICINE

## 2023-07-19 PROCEDURE — 700102 HCHG RX REV CODE 250 W/ 637 OVERRIDE(OP): Performed by: INTERNAL MEDICINE

## 2023-07-19 PROCEDURE — 71045 X-RAY EXAM CHEST 1 VIEW: CPT

## 2023-07-19 PROCEDURE — 80053 COMPREHEN METABOLIC PANEL: CPT

## 2023-07-19 PROCEDURE — 700102 HCHG RX REV CODE 250 W/ 637 OVERRIDE(OP): Mod: JZ | Performed by: INTERNAL MEDICINE

## 2023-07-19 PROCEDURE — 84100 ASSAY OF PHOSPHORUS: CPT

## 2023-07-19 PROCEDURE — 74018 RADEX ABDOMEN 1 VIEW: CPT

## 2023-07-19 PROCEDURE — C9113 INJ PANTOPRAZOLE SODIUM, VIA: HCPCS | Performed by: INTERNAL MEDICINE

## 2023-07-19 PROCEDURE — 700101 HCHG RX REV CODE 250: Performed by: STUDENT IN AN ORGANIZED HEALTH CARE EDUCATION/TRAINING PROGRAM

## 2023-07-19 PROCEDURE — 700111 HCHG RX REV CODE 636 W/ 250 OVERRIDE (IP)

## 2023-07-19 PROCEDURE — 700111 HCHG RX REV CODE 636 W/ 250 OVERRIDE (IP): Performed by: INTERNAL MEDICINE

## 2023-07-19 PROCEDURE — A9270 NON-COVERED ITEM OR SERVICE: HCPCS | Performed by: STUDENT IN AN ORGANIZED HEALTH CARE EDUCATION/TRAINING PROGRAM

## 2023-07-19 PROCEDURE — 43235 EGD DIAGNOSTIC BRUSH WASH: CPT | Performed by: INTERNAL MEDICINE

## 2023-07-19 PROCEDURE — 0DJ08ZZ INSPECTION OF UPPER INTESTINAL TRACT, VIA NATURAL OR ARTIFICIAL OPENING ENDOSCOPIC: ICD-10-PCS | Performed by: INTERNAL MEDICINE

## 2023-07-19 PROCEDURE — 700102 HCHG RX REV CODE 250 W/ 637 OVERRIDE(OP): Performed by: STUDENT IN AN ORGANIZED HEALTH CARE EDUCATION/TRAINING PROGRAM

## 2023-07-19 PROCEDURE — 700105 HCHG RX REV CODE 258: Mod: JZ | Performed by: INTERNAL MEDICINE

## 2023-07-19 PROCEDURE — 160048 HCHG OR STATISTICAL LEVEL 1-5: Performed by: INTERNAL MEDICINE

## 2023-07-19 PROCEDURE — 700111 HCHG RX REV CODE 636 W/ 250 OVERRIDE (IP): Performed by: STUDENT IN AN ORGANIZED HEALTH CARE EDUCATION/TRAINING PROGRAM

## 2023-07-19 PROCEDURE — 84145 PROCALCITONIN (PCT): CPT

## 2023-07-19 PROCEDURE — A9270 NON-COVERED ITEM OR SERVICE: HCPCS | Mod: JZ | Performed by: INTERNAL MEDICINE

## 2023-07-19 PROCEDURE — 770020 HCHG ROOM/CARE - TELE (206)

## 2023-07-19 PROCEDURE — 160035 HCHG PACU - 1ST 60 MINS PHASE I: Performed by: INTERNAL MEDICINE

## 2023-07-19 PROCEDURE — 85025 COMPLETE CBC W/AUTO DIFF WBC: CPT

## 2023-07-19 PROCEDURE — 99233 SBSQ HOSP IP/OBS HIGH 50: CPT | Performed by: INTERNAL MEDICINE

## 2023-07-19 PROCEDURE — 76705 ECHO EXAM OF ABDOMEN: CPT

## 2023-07-19 PROCEDURE — 83735 ASSAY OF MAGNESIUM: CPT

## 2023-07-19 PROCEDURE — 00731 ANES UPR GI NDSC PX NOS: CPT | Performed by: STUDENT IN AN ORGANIZED HEALTH CARE EDUCATION/TRAINING PROGRAM

## 2023-07-19 PROCEDURE — 85610 PROTHROMBIN TIME: CPT

## 2023-07-19 RX ORDER — POTASSIUM CHLORIDE 20 MEQ/1
40 TABLET, EXTENDED RELEASE ORAL 2 TIMES DAILY
Status: COMPLETED | OUTPATIENT
Start: 2023-07-19 | End: 2023-07-20

## 2023-07-19 RX ORDER — MAGNESIUM SULFATE HEPTAHYDRATE 40 MG/ML
2 INJECTION, SOLUTION INTRAVENOUS ONCE
Status: COMPLETED | OUTPATIENT
Start: 2023-07-19 | End: 2023-07-19

## 2023-07-19 RX ORDER — POTASSIUM CHLORIDE 7.45 MG/ML
10 INJECTION INTRAVENOUS
Status: DISCONTINUED | OUTPATIENT
Start: 2023-07-19 | End: 2023-07-19 | Stop reason: HOSPADM

## 2023-07-19 RX ORDER — ONDANSETRON 2 MG/ML
4 INJECTION INTRAMUSCULAR; INTRAVENOUS
Status: DISCONTINUED | OUTPATIENT
Start: 2023-07-19 | End: 2023-07-19 | Stop reason: HOSPADM

## 2023-07-19 RX ORDER — LIDOCAINE HYDROCHLORIDE 20 MG/ML
INJECTION, SOLUTION EPIDURAL; INFILTRATION; INTRACAUDAL; PERINEURAL PRN
Status: DISCONTINUED | OUTPATIENT
Start: 2023-07-19 | End: 2023-07-19 | Stop reason: HOSPADM

## 2023-07-19 RX ORDER — MAGNESIUM SULFATE HEPTAHYDRATE 40 MG/ML
INJECTION, SOLUTION INTRAVENOUS
Status: COMPLETED
Start: 2023-07-19 | End: 2023-07-19

## 2023-07-19 RX ORDER — FUROSEMIDE 10 MG/ML
40 INJECTION INTRAMUSCULAR; INTRAVENOUS
Status: DISCONTINUED | OUTPATIENT
Start: 2023-07-19 | End: 2023-07-20 | Stop reason: HOSPADM

## 2023-07-19 RX ORDER — ACETAMINOPHEN 325 MG/1
650 TABLET ORAL ONCE
Status: COMPLETED | OUTPATIENT
Start: 2023-07-19 | End: 2023-07-19

## 2023-07-19 RX ORDER — PHYTONADIONE 5 MG/1
5 TABLET ORAL ONCE
Status: COMPLETED | OUTPATIENT
Start: 2023-07-19 | End: 2023-07-19

## 2023-07-19 RX ORDER — SODIUM CHLORIDE, SODIUM LACTATE, POTASSIUM CHLORIDE, CALCIUM CHLORIDE 600; 310; 30; 20 MG/100ML; MG/100ML; MG/100ML; MG/100ML
INJECTION, SOLUTION INTRAVENOUS CONTINUOUS
Status: ACTIVE | OUTPATIENT
Start: 2023-07-19 | End: 2023-07-19

## 2023-07-19 RX ORDER — SPIRONOLACTONE 25 MG/1
25 TABLET ORAL
Status: DISCONTINUED | OUTPATIENT
Start: 2023-07-19 | End: 2023-07-20 | Stop reason: HOSPADM

## 2023-07-19 RX ADMIN — LACTULOSE 30 ML: 20 SOLUTION ORAL at 16:37

## 2023-07-19 RX ADMIN — THERA TABS 1 TABLET: TAB at 06:00

## 2023-07-19 RX ADMIN — PROPOFOL 30 MG: 10 INJECTION, EMULSION INTRAVENOUS at 08:54

## 2023-07-19 RX ADMIN — Medication 100 MG: at 06:07

## 2023-07-19 RX ADMIN — PROPOFOL 40 MG: 10 INJECTION, EMULSION INTRAVENOUS at 08:52

## 2023-07-19 RX ADMIN — ACETAMINOPHEN 650 MG: 325 TABLET ORAL at 09:40

## 2023-07-19 RX ADMIN — SODIUM CHLORIDE, POTASSIUM CHLORIDE, SODIUM LACTATE AND CALCIUM CHLORIDE: 600; 310; 30; 20 INJECTION, SOLUTION INTRAVENOUS at 08:49

## 2023-07-19 RX ADMIN — FOLIC ACID 1 MG: 1 TABLET ORAL at 06:07

## 2023-07-19 RX ADMIN — MAGNESIUM SULFATE 2 G: 2 INJECTION INTRAVENOUS at 08:46

## 2023-07-19 RX ADMIN — LIDOCAINE HYDROCHLORIDE 100 MG: 20 INJECTION, SOLUTION EPIDURAL; INFILTRATION; INTRACAUDAL at 08:52

## 2023-07-19 RX ADMIN — PANTOPRAZOLE SODIUM 40 MG: 40 INJECTION, POWDER, FOR SOLUTION INTRAVENOUS at 16:38

## 2023-07-19 RX ADMIN — MAGNESIUM SULFATE 2 G: 2 INJECTION INTRAVENOUS at 09:13

## 2023-07-19 RX ADMIN — OXYCODONE HYDROCHLORIDE 10 MG: 10 TABLET ORAL at 13:40

## 2023-07-19 RX ADMIN — OXYCODONE HYDROCHLORIDE 10 MG: 10 TABLET ORAL at 19:42

## 2023-07-19 RX ADMIN — SPIRONOLACTONE 25 MG: 25 TABLET ORAL at 16:37

## 2023-07-19 RX ADMIN — PHYTONADIONE 5 MG: 5 TABLET ORAL at 17:08

## 2023-07-19 RX ADMIN — LACTULOSE 30 ML: 20 SOLUTION ORAL at 13:34

## 2023-07-19 RX ADMIN — POTASSIUM CHLORIDE 40 MEQ: 1500 TABLET, EXTENDED RELEASE ORAL at 17:04

## 2023-07-19 RX ADMIN — PANTOPRAZOLE SODIUM 40 MG: 40 INJECTION, POWDER, FOR SOLUTION INTRAVENOUS at 06:07

## 2023-07-19 RX ADMIN — PROPOFOL 125 MCG/KG/MIN: 10 INJECTION, EMULSION INTRAVENOUS at 08:50

## 2023-07-19 RX ADMIN — FUROSEMIDE 40 MG: 10 INJECTION INTRAMUSCULAR; INTRAVENOUS at 16:35

## 2023-07-19 RX ADMIN — LACTULOSE 30 ML: 20 SOLUTION ORAL at 06:07

## 2023-07-19 RX ADMIN — CEFTRIAXONE SODIUM 2000 MG: 10 INJECTION, POWDER, FOR SOLUTION INTRAVENOUS at 06:07

## 2023-07-19 ASSESSMENT — ENCOUNTER SYMPTOMS
BLOOD IN STOOL: 0
WEIGHT LOSS: 0
NAUSEA: 0
ORTHOPNEA: 0
CONSTIPATION: 0
PHOTOPHOBIA: 0
DIARRHEA: 0
CHILLS: 0
HEMOPTYSIS: 0
ABDOMINAL PAIN: 1
COUGH: 0
FEVER: 0
PALPITATIONS: 0
DOUBLE VISION: 0
MYALGIAS: 0
WEAKNESS: 0
CLAUDICATION: 0
DIZZINESS: 0
BLURRED VISION: 0
SPEECH CHANGE: 0
VOMITING: 0
NECK PAIN: 0

## 2023-07-19 ASSESSMENT — PAIN DESCRIPTION - PAIN TYPE: TYPE: ACUTE PAIN

## 2023-07-19 ASSESSMENT — FIBROSIS 4 INDEX: FIB4 SCORE: 5

## 2023-07-19 NOTE — ANESTHESIA PREPROCEDURE EVALUATION
Case: [id number] Date/Time: 07/19/23 0845    Procedure: GASTROSCOPY - ESOPHAGOGASTRODUODENOSCOPY (Esophagus)    Anesthesia type: MAC    Pre-op diagnosis: Abdominal Pain, Anemia    Location: [facility] ROOM 26 / SURGERY SAME DAY [facility]    Surgeons: Roaxnne Melendez M.D.      43yo F w/ EtOH cirrhosis, asthma presented on 7/16 w/ diffuse abdominal pain. S/p R thoracentesis yesterday (removed 300mL fluid); not enough ascitic fluid to do paracentesis. NPO. Hb 8.5 today. K 3 and Mg 1.6; ordered 40mEq K and 2gram Mg. Vitals stable.    ECHO (7/2023): normal    Relevant Problems      (positive) ALC (alcoholic liver cirrhosis) (HCC) and alcoholic hepatitis       Physical Exam    Airway   Mallampati: II  TM distance: >3 FB  Neck ROM: full       Cardiovascular - normal exam  Rhythm: regular  Rate: normal  (-) murmur     Dental - normal exam           Pulmonary - normal exam  Breath sounds clear to auscultation     Abdominal    Neurological - normal exam                 Anesthesia Plan    ASA 3   ASA physical status 3 criteria: alcohol and/or substance dependence or abuse    Plan - MAC               Induction: intravenous    Postoperative Plan: Postoperative administration of opioids is intended.    Pertinent diagnostic labs and testing reviewed    Informed Consent:    Anesthetic plan and risks discussed with patient.    Use of blood products discussed with: patient whom consented to blood products.

## 2023-07-19 NOTE — PROGRESS NOTES
Hospital Medicine Daily Progress Note    Date of Service  7/19/2023    Chief Complaint  Windy Salidvar is a 42 y.o. female admitted 7/16/2023 with abdominal pain    Hospital Course    42-year-old female with history of alcoholism, asthma and pelvic inflammatory disease who presented 716 with abdominal pain.  Initially patient was presented to out side facility for diffuse abdominal pain with chills and nausea with vomiting and decreased appetite also noticing more yellowish.  Patient has history of heavy drinking and alcoholic liver disease.  On admission patient was found to have leukocytosis 14 hemoglobin 9.9 with total protein 13 and elevated liver enzymes.  CT for abdomen showed moderate ascites with moderate right-sided pleural effusion.  Ceftriaxone was initiated for possible SBP, initially patient was started with octreotide and PPI for possible GI bleeding.  Hemoglobin has been around 8, octreotide was discontinued later.  Paracentesis was not able to be performed due to lack of fluid however thoracentesis was done.  Case was discussed with the GI who recommended to continue antibiotics, and possible EGD during the hospitalization.      Interval Problem Update  -Evaluated examined the patient at bedside, patient still having diffuse abdominal pain, she is alert oriented x4  -Continue lactulose  -Improving on her pain  -EGD was done and showed small esophageal varices  -Ultrasound was ordered  -Hemoglobin is stable  -Replace potassium  -Start with spironolactone and Lasix due to abdominal distention      I have discussed this patient's plan of care and discharge plan at IDT rounds today with Case Management, Nursing, Nursing leadership, and other members of the IDT team.    Consultants/Specialty  GI    Code Status  Full Code    Disposition  The patient is not medically cleared for discharge to home or a post-acute facility.  Anticipate discharge to: home with close outpatient follow-up    I have placed  the appropriate orders for post-discharge needs.    Review of Systems  Review of Systems   Constitutional:  Positive for malaise/fatigue. Negative for chills, fever and weight loss.   HENT:  Negative for ear pain, hearing loss and tinnitus.    Eyes:  Negative for blurred vision, double vision and photophobia.   Respiratory:  Negative for cough and hemoptysis.    Cardiovascular:  Negative for chest pain, palpitations, orthopnea and claudication.   Gastrointestinal:  Positive for abdominal pain. Negative for blood in stool, constipation, diarrhea, nausea and vomiting.   Genitourinary:  Negative for dysuria, frequency and urgency.   Musculoskeletal:  Negative for myalgias and neck pain.   Skin:  Negative for rash.   Neurological:  Negative for dizziness, speech change and weakness.        Physical Exam  Temp:  [36.2 °C (97.2 °F)-36.7 °C (98.1 °F)] 36.2 °C (97.2 °F)  Pulse:  [] 77  Resp:  [16-20] 16  BP: ()/(52-69) 108/69  SpO2:  [92 %-100 %] 92 %    Physical Exam  Constitutional:       General: She is not in acute distress.     Appearance: She is not ill-appearing.   HENT:      Head: Normocephalic and atraumatic.   Eyes:      General: Scleral icterus present.   Cardiovascular:      Rate and Rhythm: Normal rate.      Heart sounds: No murmur heard.  Pulmonary:      Effort: No respiratory distress.      Breath sounds: No wheezing or rales.   Abdominal:      General: There is no distension.      Palpations: Abdomen is soft.      Tenderness: There is abdominal tenderness. There is no guarding.   Musculoskeletal:         General: No swelling or deformity.      Right lower leg: No edema.      Left lower leg: No edema.   Skin:     Coloration: Skin is jaundiced and pale.      Findings: Bruising and lesion present. No rash.   Neurological:      General: No focal deficit present.      Mental Status: She is oriented to person, place, and time. Mental status is at baseline.      Cranial Nerves: No cranial nerve deficit.       Sensory: No sensory deficit.      Motor: No weakness.      Gait: Gait normal.         Fluids    Intake/Output Summary (Last 24 hours) at 7/19/2023 1545  Last data filed at 7/19/2023 0905  Gross per 24 hour   Intake 680 ml   Output 600 ml   Net 80 ml       Laboratory  Recent Labs     07/17/23  0211 07/17/23  1156 07/18/23  0105 07/18/23  0900 07/18/23  1700 07/19/23  0356   WBC 12.9*  --  8.4  --   --  7.3   RBC 2.36*  --  2.46*  --   --  2.43*   HEMOGLOBIN 8.2*   < > 8.7* 8.5* 8.6* 8.5*   HEMATOCRIT 25.2*   < > 26.5* 25.7* 26.0* 26.0*   .8*  --  107.7*  --   --  107.0*   MCH 34.7*  --  35.4*  --   --  35.0*   MCHC 32.5  --  32.8  --   --  32.7   RDW 58.4*  --  57.9*  --   --  56.3*   PLATELETCT 108*  --  131*  --   --  132*   MPV 10.9  --  10.8  --   --  10.4    < > = values in this interval not displayed.     Recent Labs     07/17/23  0211 07/18/23  0105 07/19/23  0356   SODIUM 135 135 132*   POTASSIUM 3.9 4.0 3.0*   CHLORIDE 104 102 100   CO2 24 22 22   GLUCOSE 93 109* 93   BUN 9 10 6*   CREATININE 0.45* 0.26* 0.34*   CALCIUM 8.0* 8.4* 8.0*     Recent Labs     07/16/23  2335 07/19/23  0356   APTT 46.2*  --    INR 2.10* 1.90*               Imaging  US-RUQ   Final Result      1.  Features favoring cirrhosis.   2.  Stigmata of portal hypertension including hepatofugal flow, small volume ascites, and collaterals at the rosalind hepatis.   3.   Gallbladder sludge with mild thickening of the gallbladder wall. Mild pericholecystic fluid. This may be reactive to cirrhosis or represent acute cholecystitis. Consider HIDA scan as indicated.      DX-CHEST-LIMITED (1 VIEW)   Final Result         1.  Right basilar atelectasis versus subtle infiltrate, stable.      US-PARACENTESIS, ABD WITH IMAGING   Final Result      Small volume of ascites present, not adequate for safe paracentesis      US-THORACENTESIS PUNCTURE RIGHT   Final Result      1. Ultrasound guided right sided therapeutic thoracentesis.      2. 300 mL of  fluid withdrawn..      DX-CHEST-PORTABLE (1 VIEW)   Final Result      No evidence of pneumothorax postthoracentesis.      EC-ECHOCARDIOGRAM COMPLETE W/O CONT   Final Result      DX-CHEST-LIMITED (1 VIEW)   Final Result      1.  Lower lung volumes with increased underinflation atelectasis superimposed pulmonary edema or other airspace disease is possible   2.  Possible small RIGHT pleural effusion      [id number] VIEW    (Results Pending)        Assessment/Plan  * ALC (alcoholic liver cirrhosis) (HCC) and alcoholic hepatitis  Assessment & Plan  Heavy drinking for years   liver cirrhosis is seen on CT  Elevated bilirubin 13 and liver enzymes with INR  MELD score around 23  Holding steroids for possible infection at this time  Follow-up with GI, appreciate recommendations,   EGD showed esophageal varices  Discontinue octreotide and continue PPI  Lactulose 3 times a day  Start with small dose of Lasix consider lactone and close monitoring  Check ultrasound    Abdominal pain  Assessment & Plan  With distention  Possible SBP at this time we will continue ceftriaxone  Not able to get paracentesis  Labs daily  Start with Lasix  Order ultrasound for liver    Acute blood loss anemia- (present on admission)  Assessment & Plan  + Positive bedside guaiac test however patient denies significant black stool or melena and no hematemesis  Discontinue octreotide and continue PPI  Continue monitoring hemoglobin every 8 hours and transfusion if needed  GI was consulted, EGD showed varices  No signs of bleeding, hemoglobin is a stable      Alcohol dependence (HCC)  Assessment & Plan  Thiamine and Ativan per CIWA protocol  Fall, aspiration, seizure precaution  Alcohol cessation counseling    Esophageal varices (HCC)  Assessment & Plan  grade 1-II esophageal varices  Consider beta-blocker on discharge  Encouraged the patient to quit drinking  Hemoglobin is a stable    Ascites  Assessment & Plan  With abdominal pain  Likely SBP, continue  ceftriaxone  No significant fluid for paracentesis    SIRS (systemic inflammatory response syndrome) (HCC)  Assessment & Plan  SIRS criteria identified on my evaluation include:  Fever, with temperature greater than 100.9 deg F, Tachycardia, with heart rate greater than 90 BPM and Leukocytosis, with WBC greater than 12,000  Pending further work-up: COVID-19/RSV/influenza, paracentesis, paracentesis, blood culture, UA/urine culture  Possible SBP, not able to do paracentesis  We will continue ceftriaxone at this time    Pleural effusion  Assessment & Plan  Moderate right-sided pleural effusion noted on CT  Likely related to cirrhosis  Thoracentesis was done and fluid showed white blood cell 1500 and transudative with low protein and LDH, less likely to be infection, monitoring with chest x-ray and consider Lasix and spironolactone         VTE prophylaxis: SCDs/TEDs and pharmacologic prophylaxis contraindicated due to possible GI bleeding    I have performed a physical exam and reviewed and updated ROS and Plan today (7/19/2023). In review of yesterday's note (7/18/2023), there are no changes except as documented above.    Greater than 51 minutes spent prepping to see patient (e.g. review of tests) obtaining and/or reviewing separately obtained history. Performing a medically appropriate examination and/ evaluation.  Counseling and educating the patient/family/caregiver.  Ordering medications, tests, or procedures.  Referring and communicating with other health care professionals.  Documenting clinical information in EPIC.  Independently interpreting results and communicating results to patient/family/caregiver.  Care coordination

## 2023-07-19 NOTE — ASSESSMENT & PLAN NOTE
grade 1-II esophageal varices  Consider beta-blocker on discharge  Encouraged the patient to quit drinking  Hemoglobin is a stable

## 2023-07-19 NOTE — CARE PLAN
The patient is Stable - Low risk of patient condition declining or worsening    Shift Goals  Clinical Goals:   Problem: Optimal Care for Alcohol Withdrawal  Goal: Optimal Care for the alcohol withdrawal patient  Outcome: Progressing     Problem: Seizure Precautions  Goal: Implementation of seizure precautions  Outcome: Progressing     Problem: Lifestyle Changes  Goal: Patient's ability to identify lifestyle changes and available resources to help reduce recurrence of condition will improve  Outcome: Progressing     Problem: Psychosocial  Goal: Patient's level of anxiety will decrease  Outcome: Progressing  Goal: Spiritual and cultural needs incorporated into hospitalization  Outcome: Progressing   US, Dx, pain control  Patient Goals: pain control  Family Goals: n/a    Progress made toward(s) clinical / shift goals:    Problem: Pain - Standard  Goal: Alleviation of pain or a reduction in pain to the patient’s comfort goal  Outcome: Progressing

## 2023-07-19 NOTE — ANESTHESIA POSTPROCEDURE EVALUATION
Patient: Windy Saldivar    Procedure Summary     Date: 07/19/23 Room / Location: [facility] ROOM 26 / SURGERY SAME DAY [facility]    Anesthesia Start: 0849 Anesthesia Stop: 0905    Procedure: GASTROSCOPY - ESOPHAGOGASTRODUODENOSCOPY (Esophagus) Diagnosis: (Retain gastric content, small varices, )    Surgeons: Roxanne Melendez M.D. Responsible Provider: Elier Romo M.D.    Anesthesia Type: MAC ASA Status: 3          Final Anesthesia Type: MAC  Last vitals  BP   Blood Pressure: 108/69    Temp   36.2 °C (97.2 °F)    Pulse   77   Resp   16    SpO2   92 %      Anesthesia Post Evaluation    Patient location during evaluation: PACU  Patient participation: complete - patient participated  Level of consciousness: awake and alert    Airway patency: patent  Anesthetic complications: no  Cardiovascular status: hemodynamically stable  Respiratory status: acceptable  Hydration status: euvolemic    PONV: none          No notable events documented.     Nurse Pain Score: 3 (NPRS)

## 2023-07-19 NOTE — CARE PLAN
The patient is Stable - Low risk of patient condition declining or worsening    Shift Goals  Clinical Goals: NPO at midnight for EGD  Patient Goals: pain control, rest  Family Goals: MICKEY    Progress made toward(s) clinical / shift goals: Pt is aware of NPO status at midnight for EGD, pt verbalizes intention to not drink once discharged, pt has all seizure precautions in place, pt has not had any melenic stools so far this shift, pt's pain adequately controlled by PRN analgesics    Patient is not progressing towards the following goals: NA      Problem: Pain - Standard  Goal: Alleviation of pain or a reduction in pain to the patient’s comfort goal  Outcome: Progressing     Problem: Optimal Care for Alcohol Withdrawal  Goal: Optimal Care for the alcohol withdrawal patient  Outcome: Progressing     Problem: Seizure Precautions  Goal: Implementation of seizure precautions  Outcome: Progressing

## 2023-07-19 NOTE — ANESTHESIA TIME REPORT
Anesthesia Start and Stop Event Times     Date Time Event    7/19/2023 0837 Ready for Procedure     0849 Anesthesia Start     0905 Anesthesia Stop        Responsible Staff  07/19/23    Name Role Begin End    Min DIONISIO Romo M.D. Anesth 0849 0905        Overtime Reason:  no overtime (within assigned shift)    Comments:

## 2023-07-19 NOTE — PROCEDURES
OPERATIVE REPORT    PATIENT:   Windy Saldivar   1980       PREOPERATIVE DIAGNOSES/INDICATIONS: anemia, alcoholic liver disease    POSTOPERATIVE DIAGNOSIS: small esophageal varices, retained food in stomach    PROCEDURE:  ESOPHAGOGASTRODUODENOSCOPY    PHYSICIAN:  Roxanne Melendez MD    ANESTHESIA:  Per anesthesiologist.    LOCATION: [facility]    CONSENT:  OBTAINED. The risks, benefits and alternatives of the procedure were discussed in details. The risks include and are not limited to bleeding, infection, perforation, missed lesions, and sedations risks (cardiopulmonary compromise and allergic reaction to medications).    DESCRIPTION: The patient presented to the procedure room.  After routine checkup was performed, patient was brought into the endoscopy suite.  Patient was placed on her left lateral decubitus position. A bite block was placed in patient's mouth. Patient was sedated by anesthesia.  Vital signs were monitored throughout the procedure.  Oxygenation support was provided throughout the procedure. Upper endoscope was inserted into patient's mouth and advanced to the second portion of the duodenum under direct visualization.      Once the site was reached and examined, the upper endoscope was withdrawn.  Retroflexion was made within the stomach.  The stomach was decompressed, scope was withdrawn and the procedure was terminated.     The patient tolerated the procedure well.  There were no immediate complications.    OPERATIVE FINDINGS:    1. Esophagus: grade 1-II esophageal varices, ?subtle red frandy but no blood in lumen  2. Stomach: fundus partially obscured by large amount of retained food  3. Duodenum: normal    IMPRESSION/RECOMMENDATIONS:     Marked abd distention.  Ck abd xray this afternoon for abd distention.  Tympany on exam and US on 7/17 commented on trace ascites.    No imaging of liver.  Ordered US abd to evaluate liver, measure PV, look for PvT and measure spleen.  No variceal  banding at this time especially since retained food and patient not inubated.  If she was actively bleeding, we would have stopped and intubated and then banded.

## 2023-07-19 NOTE — OR NURSING
0902 Pt to PACU from OR. Report from anesthesia and OR RN. On 6L O2 via mask. Respirations even and unlabored. VSS.     0914 Dr. Melendez at bedside updating patient.    0918 Report given to VICTORIA Patel. All questions and concerns addressed at this time.    0927 Patient up to bathroom to void.    0942 Patient transported to room on [facility] with monitor with oxygen available.

## 2023-07-19 NOTE — PROGRESS NOTES
Bedside report received from VICTORIA Le. Pt on RA. Patient A&O x 4. Complains of pain 7/10 in abdomen. POC discussed with patient. Patient verbalizes understanding. Call light and belongings within reach. Bed locked in lowest position, alarm and fall precautions in place.

## 2023-07-19 NOTE — PROGRESS NOTES
Hospital Medicine Daily Progress Note    Date of Service  7/18/2023    Chief Complaint  Windy Saldivar is a 42 y.o. female admitted 7/16/2023 with abdominal pain    Hospital Course    42-year-old female with history of alcoholism, asthma and pelvic inflammatory disease who presented 716 with abdominal pain.  Initially patient was presented to out side facility for diffuse abdominal pain with chills and nausea with vomiting and decreased appetite also noticing more yellowish.  Patient has history of heavy drinking and alcoholic liver disease.  On admission patient was found to have leukocytosis 14 hemoglobin 9.9 with total protein 13 and elevated liver enzymes.  CT for abdomen showed moderate ascites with moderate right-sided pleural effusion.  Ceftriaxone was initiated for possible SBP, initially patient was started with octreotide and PPI for possible GI bleeding.  Hemoglobin has been around 8, octreotide was discontinued later.  Paracentesis was not able to be performed due to lack of fluid however thoracentesis was done.  Case was discussed with the GI who recommended to continue antibiotics, and possible EGD during the hospitalization.      Interval Problem Update  -Evaluated examined the patient at bedside, patient still having diffuse abdominal pain, she is alert oriented x4  -Continue lactulose  -Pleural fluid showed transudative fluid low protein and white blood cell 1500, less likely to be infection, monitor chest x-ray.  -Continue ceftriaxone 2 g for possible SBP,  -Hemoglobin stable, discontinue monitoring hemoglobin every 8 hours  -Planning for EGD tomorrow   -No alcohol withdrawal, CIWA was discontinued      I have discussed this patient's plan of care and discharge plan at IDT rounds today with Case Management, Nursing, Nursing leadership, and other members of the IDT team.    Consultants/Specialty  GI    Code Status  Full Code    Disposition  The patient is not medically cleared for discharge  to home or a post-acute facility.  Anticipate discharge to: home with close outpatient follow-up    I have placed the appropriate orders for post-discharge needs.    Review of Systems  Review of Systems   Constitutional:  Positive for malaise/fatigue. Negative for chills, fever and weight loss.   HENT:  Negative for ear pain, hearing loss and tinnitus.    Eyes:  Negative for blurred vision, double vision and photophobia.   Respiratory:  Negative for cough and hemoptysis.    Cardiovascular:  Negative for chest pain, palpitations, orthopnea and claudication.   Gastrointestinal:  Positive for abdominal pain. Negative for blood in stool, constipation, diarrhea, nausea and vomiting.   Genitourinary:  Negative for dysuria, frequency and urgency.   Musculoskeletal:  Negative for myalgias and neck pain.   Skin:  Negative for rash.   Neurological:  Negative for dizziness, speech change and weakness.        Physical Exam  Temp:  [36.4 °C (97.6 °F)-36.7 °C (98.1 °F)] 36.7 °C (98.1 °F)  Pulse:  [78-90] 89  Resp:  [16-18] 16  BP: ()/(59-78) 95/59  SpO2:  [91 %-100 %] 94 %    Physical Exam  Constitutional:       General: She is not in acute distress.     Appearance: She is ill-appearing.   HENT:      Head: Normocephalic and atraumatic.   Eyes:      General: Scleral icterus present.   Cardiovascular:      Rate and Rhythm: Normal rate.      Heart sounds: No murmur heard.  Pulmonary:      Effort: No respiratory distress.      Breath sounds: No wheezing or rales.   Abdominal:      General: There is no distension.      Palpations: Abdomen is soft.      Tenderness: There is abdominal tenderness. There is no guarding.   Musculoskeletal:         General: No swelling or deformity.      Right lower leg: No edema.      Left lower leg: No edema.   Skin:     Coloration: Skin is jaundiced and pale.      Findings: Bruising and lesion present. No rash.   Neurological:      General: No focal deficit present.      Mental Status: She is  oriented to person, place, and time. Mental status is at baseline.      Cranial Nerves: No cranial nerve deficit.      Sensory: No sensory deficit.      Motor: No weakness.      Gait: Gait normal.         Fluids    Intake/Output Summary (Last 24 hours) at 7/18/2023 1821  Last data filed at 7/18/2023 1500  Gross per 24 hour   Intake 240 ml   Output --   Net 240 ml       Laboratory  Recent Labs     07/17/23  0211 07/17/23  1156 07/18/23  0105 07/18/23  0900 07/18/23  1700   WBC 12.9*  --  8.4  --   --    RBC 2.36*  --  2.46*  --   --    HEMOGLOBIN 8.2*   < > 8.7* 8.5* 8.6*   HEMATOCRIT 25.2*   < > 26.5* 25.7* 26.0*   .8*  --  107.7*  --   --    MCH 34.7*  --  35.4*  --   --    MCHC 32.5  --  32.8  --   --    RDW 58.4*  --  57.9*  --   --    PLATELETCT 108*  --  131*  --   --    MPV 10.9  --  10.8  --   --     < > = values in this interval not displayed.     Recent Labs     07/16/23  2335 07/17/23  0211 07/18/23  0105   SODIUM 135 135 135   POTASSIUM 3.3* 3.9 4.0   CHLORIDE 105 104 102   CO2 20 24 22   GLUCOSE 102* 93 109*   BUN 10 9 10   CREATININE 0.34* 0.45* 0.26*   CALCIUM 8.0* 8.0* 8.4*     Recent Labs     07/16/23 2335   APTT 46.2*   INR 2.10*               Imaging  US-PARACENTESIS, ABD WITH IMAGING   Final Result      Small volume of ascites present, not adequate for safe paracentesis      US-THORACENTESIS PUNCTURE RIGHT   Final Result      1. Ultrasound guided right sided therapeutic thoracentesis.      2. 300 mL of fluid withdrawn..      DX-CHEST-PORTABLE (1 VIEW)   Final Result      No evidence of pneumothorax postthoracentesis.      EC-ECHOCARDIOGRAM COMPLETE W/O CONT   Final Result      DX-CHEST-LIMITED (1 VIEW)   Final Result      1.  Lower lung volumes with increased underinflation atelectasis superimposed pulmonary edema or other airspace disease is possible   2.  Possible small RIGHT pleural effusion           Assessment/Plan  * ALC (alcoholic liver cirrhosis) (HCC) and alcoholic  hepatitis  Assessment & Plan  Heavy drinking for years   liver cirrhosis is seen on CT  Elevated bilirubin 13 and liver enzymes with INR  MELD score around 23  Holding steroids for possible infection at this time  Follow-up with GI, appreciate recommendations, possible EGD  Discontinue octreotide and continue PPI  Lactulose  Close monitoring and consider Lasix and spironolactone    Abdominal pain  Assessment & Plan  With distention  Possible SBP at this time we will continue ceftriaxone  Not able to get paracentesis  Labs daily    Acute blood loss anemia- (present on admission)  Assessment & Plan  + Positive bedside guaiac test however patient denies significant black stool or melena and no hematemesis  Discontinue octreotide and continue PPI  Continue monitoring hemoglobin every 8 hours and transfusion if needed  GI was consulted, possible EGD during the hospitalization        Alcohol dependence (HCC)  Assessment & Plan  Thiamine and Ativan per CIWA protocol  Fall, aspiration, seizure precaution  Alcohol cessation counseling    Ascites  Assessment & Plan  With abdominal pain  Likely SBP, continue ceftriaxone  No significant fluid for paracentesis    SIRS (systemic inflammatory response syndrome) ([facility])  Assessment & Plan  SIRS criteria identified on my evaluation include:  Fever, with temperature greater than 100.9 deg F, Tachycardia, with heart rate greater than 90 BPM and Leukocytosis, with WBC greater than 12,000  Pending further work-up: COVID-19/RSV/influenza, paracentesis, paracentesis, blood culture, UA/urine culture  Possible SBP, not able to do paracentesis  We will continue ceftriaxone at this time    Pleural effusion  Assessment & Plan  Moderate right-sided pleural effusion noted on CT  Likely related to cirrhosis  Thoracentesis was done and fluid showed white blood cell 1500 and transudative with low protein and LDH, less likely to be infection, monitoring with chest x-ray and consider Lasix and  spironolactone         VTE prophylaxis: SCDs/TEDs and pharmacologic prophylaxis contraindicated due to possible GI bleeding    I have performed a physical exam and reviewed and updated ROS and Plan today (7/18/2023). In review of yesterday's note (7/17/2023), there are no changes except as documented above.    Greater than 51 minutes spent prepping to see patient (e.g. review of tests) obtaining and/or reviewing separately obtained history. Performing a medically appropriate examination and/ evaluation.  Counseling and educating the patient/family/caregiver.  Ordering medications, tests, or procedures.  Referring and communicating with other health care professionals.  Documenting clinical information in EPIC.  Independently interpreting results and communicating results to patient/family/caregiver.  Care coordination

## 2023-07-20 VITALS
HEART RATE: 86 BPM | WEIGHT: 141.31 LBS | DIASTOLIC BLOOD PRESSURE: 65 MMHG | RESPIRATION RATE: 16 BRPM | BODY MASS INDEX: 28.49 KG/M2 | HEIGHT: 59 IN | TEMPERATURE: 97.7 F | OXYGEN SATURATION: 93 % | SYSTOLIC BLOOD PRESSURE: 108 MMHG

## 2023-07-20 LAB
ALBUMIN SERPL BCP-MCNC: 2.8 G/DL (ref 3.2–4.9)
ALBUMIN/GLOB SERPL: 0.6 G/DL
ALP SERPL-CCNC: 181 U/L (ref 30–99)
ALT SERPL-CCNC: 34 U/L (ref 2–50)
AMMONIA PLAS-SCNC: 27 UMOL/L (ref 11–45)
ANION GAP SERPL CALC-SCNC: 7 MMOL/L (ref 7–16)
AST SERPL-CCNC: 88 U/L (ref 12–45)
BACTERIA FLD AEROBE CULT: NORMAL
BASOPHILS # BLD AUTO: 0.7 % (ref 0–1.8)
BASOPHILS # BLD: 0.06 K/UL (ref 0–0.12)
BILIRUB SERPL-MCNC: 4.7 MG/DL (ref 0.1–1.5)
BUN SERPL-MCNC: 4 MG/DL (ref 8–22)
CALCIUM ALBUM COR SERPL-MCNC: 9.4 MG/DL (ref 8.5–10.5)
CALCIUM SERPL-MCNC: 8.4 MG/DL (ref 8.5–10.5)
CHLORIDE SERPL-SCNC: 100 MMOL/L (ref 96–112)
CO2 SERPL-SCNC: 25 MMOL/L (ref 20–33)
CREAT SERPL-MCNC: 0.38 MG/DL (ref 0.5–1.4)
EOSINOPHIL # BLD AUTO: 0.26 K/UL (ref 0–0.51)
EOSINOPHIL NFR BLD: 3.1 % (ref 0–6.9)
ERYTHROCYTE [DISTWIDTH] IN BLOOD BY AUTOMATED COUNT: 54.1 FL (ref 35.9–50)
GFR SERPLBLD CREATININE-BSD FMLA CKD-EPI: 127 ML/MIN/1.73 M 2
GLOBULIN SER CALC-MCNC: 4.5 G/DL (ref 1.9–3.5)
GLUCOSE SERPL-MCNC: 106 MG/DL (ref 65–99)
GRAM STN SPEC: NORMAL
HCT VFR BLD AUTO: 26.6 % (ref 37–47)
HGB BLD-MCNC: 8.9 G/DL (ref 12–16)
IMM GRANULOCYTES # BLD AUTO: 0.14 K/UL (ref 0–0.11)
IMM GRANULOCYTES NFR BLD AUTO: 1.7 % (ref 0–0.9)
LYMPHOCYTES # BLD AUTO: 1.89 K/UL (ref 1–4.8)
LYMPHOCYTES NFR BLD: 22.5 % (ref 22–41)
MCH RBC QN AUTO: 35.3 PG (ref 27–33)
MCHC RBC AUTO-ENTMCNC: 33.5 G/DL (ref 32.2–35.5)
MCV RBC AUTO: 105.6 FL (ref 81.4–97.8)
MONOCYTES # BLD AUTO: 0.72 K/UL (ref 0–0.85)
MONOCYTES NFR BLD AUTO: 8.6 % (ref 0–13.4)
NEUTROPHILS # BLD AUTO: 5.33 K/UL (ref 1.82–7.42)
NEUTROPHILS NFR BLD: 63.4 % (ref 44–72)
NRBC # BLD AUTO: 0 K/UL
NRBC BLD-RTO: 0 /100 WBC (ref 0–0.2)
PLATELET # BLD AUTO: 151 K/UL (ref 164–446)
PMV BLD AUTO: 10.3 FL (ref 9–12.9)
POTASSIUM SERPL-SCNC: 3.8 MMOL/L (ref 3.6–5.5)
PROT SERPL-MCNC: 7.3 G/DL (ref 6–8.2)
RBC # BLD AUTO: 2.52 M/UL (ref 4.2–5.4)
SIGNIFICANT IND 70042: NORMAL
SITE SITE: NORMAL
SODIUM SERPL-SCNC: 132 MMOL/L (ref 135–145)
SOURCE SOURCE: NORMAL
WBC # BLD AUTO: 8.4 K/UL (ref 4.8–10.8)

## 2023-07-20 PROCEDURE — 80053 COMPREHEN METABOLIC PANEL: CPT

## 2023-07-20 PROCEDURE — 700102 HCHG RX REV CODE 250 W/ 637 OVERRIDE(OP): Performed by: INTERNAL MEDICINE

## 2023-07-20 PROCEDURE — 99239 HOSP IP/OBS DSCHRG MGMT >30: CPT | Performed by: INTERNAL MEDICINE

## 2023-07-20 PROCEDURE — C9113 INJ PANTOPRAZOLE SODIUM, VIA: HCPCS | Performed by: INTERNAL MEDICINE

## 2023-07-20 PROCEDURE — 700102 HCHG RX REV CODE 250 W/ 637 OVERRIDE(OP): Mod: JZ | Performed by: INTERNAL MEDICINE

## 2023-07-20 PROCEDURE — 700111 HCHG RX REV CODE 636 W/ 250 OVERRIDE (IP): Performed by: INTERNAL MEDICINE

## 2023-07-20 PROCEDURE — A9270 NON-COVERED ITEM OR SERVICE: HCPCS | Performed by: INTERNAL MEDICINE

## 2023-07-20 PROCEDURE — 85025 COMPLETE CBC W/AUTO DIFF WBC: CPT

## 2023-07-20 PROCEDURE — A9270 NON-COVERED ITEM OR SERVICE: HCPCS | Mod: JZ | Performed by: INTERNAL MEDICINE

## 2023-07-20 PROCEDURE — 82140 ASSAY OF AMMONIA: CPT

## 2023-07-20 RX ORDER — LACTULOSE 20 G/30ML
30 SOLUTION ORAL 3 TIMES DAILY
Qty: 450 ML | Refills: 4 | Status: SHIPPED | OUTPATIENT
Start: 2023-07-20

## 2023-07-20 RX ORDER — FUROSEMIDE 40 MG/1
20 TABLET ORAL DAILY
Qty: 30 TABLET | Refills: 1 | Status: SHIPPED | OUTPATIENT
Start: 2023-07-20

## 2023-07-20 RX ORDER — SPIRONOLACTONE 25 MG/1
25 TABLET ORAL DAILY
Qty: 30 TABLET | Refills: 3 | Status: ON HOLD | OUTPATIENT
Start: 2023-07-20 | End: 2024-01-03

## 2023-07-20 RX ADMIN — LACTULOSE 30 ML: 20 SOLUTION ORAL at 04:28

## 2023-07-20 RX ADMIN — Medication 100 MG: at 04:27

## 2023-07-20 RX ADMIN — THERA TABS 1 TABLET: TAB at 04:27

## 2023-07-20 RX ADMIN — POTASSIUM CHLORIDE 40 MEQ: 1500 TABLET, EXTENDED RELEASE ORAL at 04:27

## 2023-07-20 RX ADMIN — CEFTRIAXONE SODIUM 2000 MG: 10 INJECTION, POWDER, FOR SOLUTION INTRAVENOUS at 04:27

## 2023-07-20 RX ADMIN — PANTOPRAZOLE SODIUM 40 MG: 40 INJECTION, POWDER, FOR SOLUTION INTRAVENOUS at 04:27

## 2023-07-20 RX ADMIN — FOLIC ACID 1 MG: 1 TABLET ORAL at 04:27

## 2023-07-20 NOTE — PROGRESS NOTES
Discharge lounge appropriate, waiting for ride. All questions answered, VSS. Patient getting dressed.

## 2023-07-20 NOTE — CARE PLAN
The patient is Stable - Low risk of patient condition declining or worsening    Shift Goals  Clinical Goals: pain mgmt; safety  Patient Goals: sleep well; pain mgmt  Family Goals: n/a    Progress made toward(s) clinical / shift goals:  progressing     Problem: Lifestyle Changes  Goal: Patient's ability to identify lifestyle changes and available resources to help reduce recurrence of condition will improve  Outcome: Progressing     Problem: Psychosocial  Goal: Patient's level of anxiety will decrease  Outcome: Progressing     Problem: Knowledge Deficit - Standard  Goal: Patient and family/care givers will demonstrate understanding of plan of care, disease process/condition, diagnostic tests and medications  Outcome: Progressing     Problem: Fall Risk  Goal: Patient will remain free from falls  Outcome: Progressing     Problem: Hemodynamics  Goal: Patient's hemodynamics, fluid balance and neurologic status will be stable or improve  Outcome: Progressing     Problem: Fluid Volume  Goal: Fluid volume balance will be maintained  Outcome: Progressing     Problem: Urinary - Renal Perfusion  Goal: Ability to achieve and maintain adequate renal perfusion and functioning will improve  Outcome: Progressing     Problem: Bowel Elimination  Goal: Establish and maintain regular bowel function  Outcome: Progressing     Problem: Gastrointestinal Irritability  Goal: Nausea and vomiting will be absent or improve  Outcome: Progressing  Goal: Diarrhea will be absent or improved  Outcome: Progressing       Problem: Optimal Care for Alcohol Withdrawal  Goal: Optimal Care for the alcohol withdrawal patient  Outcome: Met     Problem: Seizure Precautions  Goal: Implementation of seizure precautions  Outcome: Met     Problem: Risk for Aspiration  Goal: Patient's risk for aspiration will be absent or decrease  Outcome: Met     Problem: Respiratory  Goal: Patient will achieve/maintain optimum respiratory ventilation and gas exchange  Outcome:  Met     Problem: Mechanical Ventilation  Goal: Safe management of artificial airway and ventilation  Outcome: Met  Goal: Successful weaning off mechanical ventilator, spontaneously maintains adequate gas exchange  Outcome: Met  Goal: Patient will be able to express needs and understand communication  Outcome: Met       Patient is not progressing towards the following goals:

## 2023-07-20 NOTE — PROGRESS NOTES
MONITOR SUMMARY:    RHYTHM:  SR  HEART RATE: 81-88bpm  ECTOPY: n/a  MEASUREMENT: .12/.08/.36

## 2023-07-20 NOTE — DISCHARGE SUMMARY
Discharge Summary    CHIEF COMPLAINT ON ADMISSION  Chief Complaint   Patient presents with    Abdominal Pain     BIB [facility] transfer from another hospital. Patient reports diffuse abdominal pain x3 days. Limited PO intake x2. Patient reports last drink 7/15.       Reason for Admission  Alcoholic cirrhosis, decompensation with possible SBP infection    Admission Date  7/16/2023    CODE STATUS  Full Code    HPI & HOSPITAL COURSE    42-year-old female with history of alcoholism, asthma and pelvic inflammatory disease who presented 716 with abdominal pain.  Initially patient was presented to out side facility for diffuse abdominal pain with chills and nausea with vomiting and decreased appetite also noticing more yellowish.  Patient has history of heavy drinking and alcoholic liver disease.  On admission patient was found to have leukocytosis 14 hemoglobin 9.9 with total protein 13 and elevated liver enzymes.  CT for abdomen showed moderate ascites with moderate right-sided pleural effusion.  Ceftriaxone was initiated for possible SBP, initially patient was started with octreotide and PPI for possible GI bleeding.  Hemoglobin has been around 8, octreotide was discontinued later.  Thoracentesis was done and removed 800 mL, fluid was transudative, paracentesis was not able to be performed due to lack of fluid however thoracentesis was done.  GI was consulted, patient underwent EGD  and showed grade 1-II esophageal varices, her hemoglobin has been stable, patient received Lasix, and spironolactone with 5 days course of antibiotics with improving on her pain and leukocytosis.  Patient feels okay and stable for discharge, patient was cleared by GI team for discharge to follow-up closely in clinic.  Also patient might need beta-blockers for esophageal varices which will be started as outpatient.    On the day of discharge the patient is alert oriented x4, some abdominal pain however not significant, no significant lower  Problem: Pain Goal: *Control of Pain Outcome: Progressing Towards Goal 
  
Problem: Patient Education: Go to Patient Education Activity Goal: Patient/Family Education Outcome: Progressing Towards Goal 
  
Problem: Falls - Risk of 
Goal: *Absence of Falls Description Document Sharlene Jack Fall Risk and appropriate interventions in the flowsheet. Outcome: Progressing Towards Goal 
  
Problem: Patient Education: Go to Patient Education Activity Goal: Patient/Family Education Outcome: Progressing Towards Goal 
  
Problem: Surgical Pathway Post-Op Day 1 Goal: Activity/Safety Outcome: Progressing Towards Goal 
Goal: Diagnostic Test/Procedures Outcome: Progressing Towards Goal 
Goal: Nutrition/Diet Outcome: Progressing Towards Goal 
Goal: Discharge Planning Outcome: Progressing Towards Goal 
Goal: Medications Outcome: Progressing Towards Goal 
Goal: Respiratory Outcome: Progressing Towards Goal 
Goal: Treatments/Interventions/Procedures Outcome: Progressing Towards Goal 
Goal: Psychosocial 
Outcome: Progressing Towards Goal 
Goal: *No signs and symptoms of infection or wound complications Outcome: Progressing Towards Goal 
Goal: *Optimal pain control at patient's stated goal 
Outcome: Progressing Towards Goal 
Goal: *Adequate urinary output (equal to or greater than 30 milliliters/hour) Outcome: Progressing Towards Goal 
Goal: *Hemodynamically stable Outcome: Progressing Towards Goal 
Goal: *Tolerating diet Outcome: Progressing Towards Goal 
Goal: *Demonstrates progressive activity Outcome: Progressing Towards Goal 
Goal: *Lungs clear or at baseline Outcome: Progressing Towards Goal 
  
Problem: Surgical Pathway: Discharge Outcomes Goal: *Hemodynamically stable Outcome: Progressing Towards Goal 
Goal: *Lungs clear or at baseline Outcome: Progressing Towards Goal 
Goal: *Demonstrates independent activity or return to baseline Outcome: Progressing Towards Goal 
 Goal: *Optimal pain control at patient's stated goal 
Outcome: Progressing Towards Goal 
Goal: *Verbalizes understanding and describes prescribed diet Outcome: Progressing Towards Goal 
Goal: *Tolerating diet Outcome: Progressing Towards Goal 
Goal: *Verbalizes name, dosage, time, side effects, and number of days to continue medications Outcome: Progressing Towards Goal 
Goal: *No signs and symptoms of infection or wound complications Outcome: Progressing Towards Goal 
Goal: *Anxiety reduced or absent Outcome: Progressing Towards Goal 
Goal: *Understands and describes signs and symptoms to report to providers(Stroke Metric) Outcome: Progressing Towards Goal 
Goal: *Describes follow-up/return visits to physicians Outcome: Progressing Towards Goal 
Goal: *Describes available resources and support systems Outcome: Progressing Towards Goal 
  
 extremity edema, patient asked to continue diuretics, encouraged her to follow-up closely with PCP and GI to repeat labs to check her potassium and kidney function, patient understood.  Also discussed the risk of worsening cirrhosis and the importance of quitting drinking, patient understood and she is willing to quit drinking alcohol.    Therefore, she is discharged in good and stable condition to home with close outpatient follow-up.    The patient met 2-midnight criteria for an inpatient stay at the time of discharge.    Discharge Date  07/20/23      FOLLOW UP ITEMS POST DISCHARGE  Follow-up with hepatology and GI    DISCHARGE DIAGNOSES  Principal Problem:    ALC (alcoholic liver cirrhosis) (HCC) and alcoholic hepatitis (POA: Unknown)  Active Problems:    Acute blood loss anemia (POA: Yes)    Abdominal pain (POA: Unknown)    Alcohol dependence (HCC) (POA: Unknown)    SIRS (systemic inflammatory response syndrome) (HCC) (POA: Unknown)    Ascites (POA: Unknown)    Esophageal varices (HCC) (POA: Unknown)    Pleural effusion (POA: Unknown)  Resolved Problems:    * No resolved hospital problems. *      FOLLOW UP  Primary Care Physician    Schedule an appointment as soon as possible for a visit        MEDICATIONS ON DISCHARGE     Medication List        START taking these medications        Instructions   furosemide 40 MG Tabs  Commonly known as: Lasix   Take 0.5 Tablets by mouth every day.  Dose: 20 mg     lactulose 20 GM/30ML Soln   Take 30 mL by mouth 3 times a day.  Dose: 30 mL     spironolactone 25 MG Tabs  Commonly known as: Aldactone   Take 1 Tablet by mouth every day.  Dose: 25 mg            CONTINUE taking these medications        Instructions   * albuterol 2.5mg/3ml Nebu solution for nebulization  Commonly known as: Proventil   3 mL by Nebulization route every four hours as needed for Shortness of Breath.  Dose: 2.5 mg     * albuterol 108 (90 Base) MCG/ACT Aers inhalation aerosol   Inhale 2 Puffs by mouth  "every four hours as needed for Shortness of Breath.  Dose: 2 Puff     Flovent  MCG/ACT Aero  Generic drug: fluticasone   Inhale 1 Puff 2 times a day.  Dose: 1 Puff     fluticasone 50 MCG/ACT nasal spray  Commonly known as: Flonase   Administer 2 Sprays into affected nostril(S) every day.  Dose: 2 Spray     ipratropium 0.03 % Soln  Commonly known as: Atrovent   Administer 2 Sprays into affected nostril(S) 2 times a day.  Dose: 2 Spray     MAGNESIUM-ZINC PO   Take 1 Tablet by mouth every day.  Dose: 1 Tablet     therapeutic multivitamin-minerals Tabs   Take 1 Tablet by mouth every day.  Dose: 1 Tablet           * This list has 2 medication(s) that are the same as other medications prescribed for you. Read the directions carefully, and ask your doctor or other care provider to review them with you.                  Allergies  Allergies   Allergen Reactions    Aspirin Shortness of Breath     Shortness of breath, asthma exacerbation, and \"a congested feeling.\"       DIET  Orders Placed This Encounter   Procedures    Diet Order Diet: Full Liquid     Standing Status:   Standing     Number of Occurrences:   1     Order Specific Question:   Diet:     Answer:   Full Liquid [11]       ACTIVITY  As tolerated.  Weight bearing as tolerated    CONSULTATIONS  GI    PROCEDURES  EGD    LABORATORY  Lab Results   Component Value Date    SODIUM 132 (L) 07/20/2023    POTASSIUM 3.8 07/20/2023    CHLORIDE 100 07/20/2023    CO2 25 07/20/2023    GLUCOSE 106 (H) 07/20/2023    BUN 4 (L) 07/20/2023    CREATININE 0.38 (L) 07/20/2023        Lab Results   Component Value Date    WBC 8.4 07/20/2023    HEMOGLOBIN 8.9 (L) 07/20/2023    HEMATOCRIT 26.6 (L) 07/20/2023    PLATELETCT 151 (L) 07/20/2023        Total time of the discharge process exceeds 35 minutes.  "

## 2023-07-22 LAB
BACTERIA BLD CULT: NORMAL
BACTERIA BLD CULT: NORMAL
SIGNIFICANT IND 70042: NORMAL
SIGNIFICANT IND 70042: NORMAL
SITE SITE: NORMAL
SITE SITE: NORMAL
SOURCE SOURCE: NORMAL
SOURCE SOURCE: NORMAL

## 2023-08-15 LAB
FUNGUS SPEC CULT: NORMAL
FUNGUS SPEC FUNGUS STN: NORMAL
SIGNIFICANT IND 70042: NORMAL
SITE SITE: NORMAL
SOURCE SOURCE: NORMAL

## 2023-08-29 LAB
MYCOBACTERIUM SPEC CULT: NORMAL
RHODAMINE-AURAMINE STN SPEC: NORMAL
SIGNIFICANT IND 70042: NORMAL
SITE SITE: NORMAL
SOURCE SOURCE: NORMAL

## 2024-01-02 ENCOUNTER — HOSPITAL ENCOUNTER (OUTPATIENT)
Dept: RADIOLOGY | Facility: MEDICAL CENTER | Age: 44
End: 2024-01-02

## 2024-01-02 ENCOUNTER — HOSPITAL ENCOUNTER (INPATIENT)
Facility: MEDICAL CENTER | Age: 44
LOS: 1 days | DRG: 433 | End: 2024-01-03
Attending: HOSPITALIST | Admitting: STUDENT IN AN ORGANIZED HEALTH CARE EDUCATION/TRAINING PROGRAM
Payer: COMMERCIAL

## 2024-01-02 DIAGNOSIS — D59.9 ACQUIRED HEMOLYTIC ANEMIA (HCC): ICD-10-CM

## 2024-01-02 DIAGNOSIS — K70.31 ALCOHOLIC CIRRHOSIS OF LIVER WITH ASCITES (HCC): ICD-10-CM

## 2024-01-02 DIAGNOSIS — R10.30 LOWER ABDOMINAL PAIN: ICD-10-CM

## 2024-01-02 DIAGNOSIS — N83.8 OVARIAN MASS: ICD-10-CM

## 2024-01-02 PROBLEM — E80.6 HYPERBILIRUBINEMIA: Status: ACTIVE | Noted: 2024-01-02

## 2024-01-02 PROBLEM — K82.8 THICKENING OF WALL OF GALLBLADDER: Status: ACTIVE | Noted: 2024-01-02

## 2024-01-02 PROBLEM — E87.6 HYPOKALEMIA: Status: ACTIVE | Noted: 2024-01-02

## 2024-01-02 PROBLEM — K81.9 CHOLECYSTITIS: Status: ACTIVE | Noted: 2024-01-02

## 2024-01-02 LAB
ALBUMIN SERPL BCP-MCNC: 3.1 G/DL (ref 3.2–4.9)
ALBUMIN/GLOB SERPL: 0.8 G/DL
ALP SERPL-CCNC: 119 U/L (ref 30–99)
ALT SERPL-CCNC: 31 U/L (ref 2–50)
ANION GAP SERPL CALC-SCNC: 9 MMOL/L (ref 7–16)
AST SERPL-CCNC: 76 U/L (ref 12–45)
BASOPHILS # BLD AUTO: 0.8 % (ref 0–1.8)
BASOPHILS # BLD: 0.05 K/UL (ref 0–0.12)
BILIRUB CONJ SERPL-MCNC: 1.8 MG/DL (ref 0.1–0.5)
BILIRUB SERPL-MCNC: 8 MG/DL (ref 0.1–1.5)
BUN SERPL-MCNC: 9 MG/DL (ref 8–22)
CALCIUM ALBUM COR SERPL-MCNC: 8.9 MG/DL (ref 8.5–10.5)
CALCIUM SERPL-MCNC: 8.2 MG/DL (ref 8.5–10.5)
CANCER AG125 SERPL-ACNC: 160 U/ML (ref 0–35)
CANCER AG19-9 SERPL-ACNC: 13.3 U/ML (ref 0–35)
CHLORIDE SERPL-SCNC: 106 MMOL/L (ref 96–112)
CO2 SERPL-SCNC: 20 MMOL/L (ref 20–33)
CORTIS SERPL-MCNC: 3.3 UG/DL (ref 0–23)
CREAT SERPL-MCNC: 0.35 MG/DL (ref 0.5–1.4)
CRP SERPL HS-MCNC: 0.68 MG/DL (ref 0–0.75)
EOSINOPHIL # BLD AUTO: 0.36 K/UL (ref 0–0.51)
EOSINOPHIL NFR BLD: 5.7 % (ref 0–6.9)
ERYTHROCYTE [DISTWIDTH] IN BLOOD BY AUTOMATED COUNT: 51.7 FL (ref 35.9–50)
ERYTHROCYTE [SEDIMENTATION RATE] IN BLOOD BY WESTERGREN METHOD: 94 MM/HOUR (ref 0–25)
ETHANOL BLD-MCNC: <10.1 MG/DL
GFR SERPLBLD CREATININE-BSD FMLA CKD-EPI: 129 ML/MIN/1.73 M 2
GLOBULIN SER CALC-MCNC: 4.1 G/DL (ref 1.9–3.5)
GLUCOSE SERPL-MCNC: 109 MG/DL (ref 65–99)
HCT VFR BLD AUTO: 26 % (ref 37–47)
HGB BLD-MCNC: 8.8 G/DL (ref 12–16)
IMM GRANULOCYTES # BLD AUTO: 0.04 K/UL (ref 0–0.11)
IMM GRANULOCYTES NFR BLD AUTO: 0.6 % (ref 0–0.9)
INR PPP: 2 (ref 0.87–1.13)
LACTATE SERPL-SCNC: 1.7 MMOL/L (ref 0.5–2)
LDH SERPL L TO P-CCNC: 257 U/L (ref 107–266)
LIPASE SERPL-CCNC: 46 U/L (ref 11–82)
LYMPHOCYTES # BLD AUTO: 0.96 K/UL (ref 1–4.8)
LYMPHOCYTES NFR BLD: 15.2 % (ref 22–41)
MAGNESIUM SERPL-MCNC: 1.8 MG/DL (ref 1.5–2.5)
MCH RBC QN AUTO: 34.5 PG (ref 27–33)
MCHC RBC AUTO-ENTMCNC: 33.8 G/DL (ref 32.2–35.5)
MCV RBC AUTO: 102 FL (ref 81.4–97.8)
MONOCYTES # BLD AUTO: 0.28 K/UL (ref 0–0.85)
MONOCYTES NFR BLD AUTO: 4.4 % (ref 0–13.4)
NEUTROPHILS # BLD AUTO: 4.64 K/UL (ref 1.82–7.42)
NEUTROPHILS NFR BLD: 73.3 % (ref 44–72)
NRBC # BLD AUTO: 0 K/UL
NRBC BLD-RTO: 0 /100 WBC (ref 0–0.2)
PHOSPHATE SERPL-MCNC: 2.4 MG/DL (ref 2.5–4.5)
PLATELET # BLD AUTO: 106 K/UL (ref 164–446)
PMV BLD AUTO: 10.4 FL (ref 9–12.9)
POTASSIUM SERPL-SCNC: 3.3 MMOL/L (ref 3.6–5.5)
PROCALCITONIN SERPL-MCNC: 0.12 NG/ML
PROT SERPL-MCNC: 7.2 G/DL (ref 6–8.2)
PROTHROMBIN TIME: 22.9 SEC (ref 12–14.6)
RBC # BLD AUTO: 2.55 M/UL (ref 4.2–5.4)
SODIUM SERPL-SCNC: 135 MMOL/L (ref 135–145)
WBC # BLD AUTO: 6.3 K/UL (ref 4.8–10.8)

## 2024-01-02 PROCEDURE — 86301 IMMUNOASSAY TUMOR CA 19-9: CPT

## 2024-01-02 PROCEDURE — 83690 ASSAY OF LIPASE: CPT

## 2024-01-02 PROCEDURE — 82533 TOTAL CORTISOL: CPT

## 2024-01-02 PROCEDURE — 85025 COMPLETE CBC W/AUTO DIFF WBC: CPT

## 2024-01-02 PROCEDURE — 83615 LACTATE (LD) (LDH) ENZYME: CPT

## 2024-01-02 PROCEDURE — A9270 NON-COVERED ITEM OR SERVICE: HCPCS | Performed by: STUDENT IN AN ORGANIZED HEALTH CARE EDUCATION/TRAINING PROGRAM

## 2024-01-02 PROCEDURE — 700105 HCHG RX REV CODE 258: Performed by: STUDENT IN AN ORGANIZED HEALTH CARE EDUCATION/TRAINING PROGRAM

## 2024-01-02 PROCEDURE — 84100 ASSAY OF PHOSPHORUS: CPT

## 2024-01-02 PROCEDURE — 82248 BILIRUBIN DIRECT: CPT

## 2024-01-02 PROCEDURE — 84145 PROCALCITONIN (PCT): CPT

## 2024-01-02 PROCEDURE — 83605 ASSAY OF LACTIC ACID: CPT

## 2024-01-02 PROCEDURE — 85652 RBC SED RATE AUTOMATED: CPT

## 2024-01-02 PROCEDURE — 99223 1ST HOSP IP/OBS HIGH 75: CPT | Performed by: STUDENT IN AN ORGANIZED HEALTH CARE EDUCATION/TRAINING PROGRAM

## 2024-01-02 PROCEDURE — 85610 PROTHROMBIN TIME: CPT

## 2024-01-02 PROCEDURE — 83735 ASSAY OF MAGNESIUM: CPT

## 2024-01-02 PROCEDURE — 86140 C-REACTIVE PROTEIN: CPT

## 2024-01-02 PROCEDURE — 82077 ASSAY SPEC XCP UR&BREATH IA: CPT

## 2024-01-02 PROCEDURE — 99253 IP/OBS CNSLTJ NEW/EST LOW 45: CPT | Performed by: SURGERY

## 2024-01-02 PROCEDURE — 80053 COMPREHEN METABOLIC PANEL: CPT

## 2024-01-02 PROCEDURE — 36415 COLL VENOUS BLD VENIPUNCTURE: CPT

## 2024-01-02 PROCEDURE — 86304 IMMUNOASSAY TUMOR CA 125: CPT

## 2024-01-02 PROCEDURE — C9113 INJ PANTOPRAZOLE SODIUM, VIA: HCPCS | Performed by: STUDENT IN AN ORGANIZED HEALTH CARE EDUCATION/TRAINING PROGRAM

## 2024-01-02 PROCEDURE — 700111 HCHG RX REV CODE 636 W/ 250 OVERRIDE (IP): Performed by: STUDENT IN AN ORGANIZED HEALTH CARE EDUCATION/TRAINING PROGRAM

## 2024-01-02 PROCEDURE — 700102 HCHG RX REV CODE 250 W/ 637 OVERRIDE(OP): Performed by: STUDENT IN AN ORGANIZED HEALTH CARE EDUCATION/TRAINING PROGRAM

## 2024-01-02 PROCEDURE — 770001 HCHG ROOM/CARE - MED/SURG/GYN PRIV*

## 2024-01-02 RX ORDER — SODIUM CHLORIDE, SODIUM LACTATE, POTASSIUM CHLORIDE, CALCIUM CHLORIDE 600; 310; 30; 20 MG/100ML; MG/100ML; MG/100ML; MG/100ML
INJECTION, SOLUTION INTRAVENOUS CONTINUOUS
Status: DISCONTINUED | OUTPATIENT
Start: 2024-01-02 | End: 2024-01-02

## 2024-01-02 RX ORDER — PROCHLORPERAZINE EDISYLATE 5 MG/ML
5-10 INJECTION INTRAMUSCULAR; INTRAVENOUS EVERY 4 HOURS PRN
Status: DISCONTINUED | OUTPATIENT
Start: 2024-01-02 | End: 2024-01-03 | Stop reason: HOSPADM

## 2024-01-02 RX ORDER — LORAZEPAM 1 MG/1
1 TABLET ORAL EVERY 4 HOURS PRN
Status: DISCONTINUED | OUTPATIENT
Start: 2024-01-02 | End: 2024-01-03 | Stop reason: HOSPADM

## 2024-01-02 RX ORDER — DIAZEPAM 5 MG/ML
10 INJECTION, SOLUTION INTRAMUSCULAR; INTRAVENOUS
Status: DISCONTINUED | OUTPATIENT
Start: 2024-01-02 | End: 2024-01-03 | Stop reason: HOSPADM

## 2024-01-02 RX ORDER — SPIRONOLACTONE 50 MG/1
25 TABLET, FILM COATED ORAL DAILY
Status: DISCONTINUED | OUTPATIENT
Start: 2024-01-03 | End: 2024-01-03 | Stop reason: HOSPADM

## 2024-01-02 RX ORDER — BISACODYL 10 MG
10 SUPPOSITORY, RECTAL RECTAL
Status: DISCONTINUED | OUTPATIENT
Start: 2024-01-02 | End: 2024-01-03 | Stop reason: HOSPADM

## 2024-01-02 RX ORDER — POLYETHYLENE GLYCOL 3350 17 G/17G
1 POWDER, FOR SOLUTION ORAL
Status: DISCONTINUED | OUTPATIENT
Start: 2024-01-02 | End: 2024-01-03 | Stop reason: HOSPADM

## 2024-01-02 RX ORDER — FUROSEMIDE 20 MG/1
20 TABLET ORAL DAILY
Status: DISCONTINUED | OUTPATIENT
Start: 2024-01-03 | End: 2024-01-03 | Stop reason: HOSPADM

## 2024-01-02 RX ORDER — FLUTICASONE PROPIONATE 110 UG/1
1 AEROSOL, METERED RESPIRATORY (INHALATION) 2 TIMES DAILY
Status: DISCONTINUED | OUTPATIENT
Start: 2024-01-02 | End: 2024-01-03 | Stop reason: HOSPADM

## 2024-01-02 RX ORDER — MAGNESIUM SULFATE HEPTAHYDRATE 40 MG/ML
2 INJECTION, SOLUTION INTRAVENOUS ONCE
Status: COMPLETED | OUTPATIENT
Start: 2024-01-02 | End: 2024-01-02

## 2024-01-02 RX ORDER — SODIUM CHLORIDE, SODIUM LACTATE, POTASSIUM CHLORIDE, CALCIUM CHLORIDE 600; 310; 30; 20 MG/100ML; MG/100ML; MG/100ML; MG/100ML
1000 INJECTION, SOLUTION INTRAVENOUS CONTINUOUS
Status: DISCONTINUED | OUTPATIENT
Start: 2024-01-02 | End: 2024-01-03

## 2024-01-02 RX ORDER — ONDANSETRON 4 MG/1
4 TABLET, ORALLY DISINTEGRATING ORAL EVERY 4 HOURS PRN
Status: DISCONTINUED | OUTPATIENT
Start: 2024-01-02 | End: 2024-01-03 | Stop reason: HOSPADM

## 2024-01-02 RX ORDER — PANTOPRAZOLE SODIUM 40 MG/10ML
40 INJECTION, POWDER, LYOPHILIZED, FOR SOLUTION INTRAVENOUS 2 TIMES DAILY
Status: DISCONTINUED | OUTPATIENT
Start: 2024-01-02 | End: 2024-01-03 | Stop reason: HOSPADM

## 2024-01-02 RX ORDER — PROMETHAZINE HYDROCHLORIDE 25 MG/1
12.5-25 TABLET ORAL EVERY 4 HOURS PRN
Status: DISCONTINUED | OUTPATIENT
Start: 2024-01-02 | End: 2024-01-03 | Stop reason: HOSPADM

## 2024-01-02 RX ORDER — POTASSIUM CHLORIDE 20 MEQ/1
60 TABLET, EXTENDED RELEASE ORAL ONCE
Status: COMPLETED | OUTPATIENT
Start: 2024-01-02 | End: 2024-01-02

## 2024-01-02 RX ORDER — HYDROMORPHONE HYDROCHLORIDE 1 MG/ML
0.5 INJECTION, SOLUTION INTRAMUSCULAR; INTRAVENOUS; SUBCUTANEOUS
Status: DISCONTINUED | OUTPATIENT
Start: 2024-01-02 | End: 2024-01-03 | Stop reason: HOSPADM

## 2024-01-02 RX ORDER — LORAZEPAM 1 MG/1
3 TABLET ORAL
Status: DISCONTINUED | OUTPATIENT
Start: 2024-01-02 | End: 2024-01-03 | Stop reason: HOSPADM

## 2024-01-02 RX ORDER — LORAZEPAM 1 MG/1
0.5 TABLET ORAL EVERY 4 HOURS PRN
Status: DISCONTINUED | OUTPATIENT
Start: 2024-01-02 | End: 2024-01-03 | Stop reason: HOSPADM

## 2024-01-02 RX ORDER — ALBUTEROL SULFATE 90 UG/1
2 AEROSOL, METERED RESPIRATORY (INHALATION) EVERY 4 HOURS PRN
Status: DISCONTINUED | OUTPATIENT
Start: 2024-01-02 | End: 2024-01-03 | Stop reason: HOSPADM

## 2024-01-02 RX ORDER — LORAZEPAM 1 MG/1
4 TABLET ORAL
Status: DISCONTINUED | OUTPATIENT
Start: 2024-01-02 | End: 2024-01-03 | Stop reason: HOSPADM

## 2024-01-02 RX ORDER — OXYCODONE HYDROCHLORIDE 10 MG/1
10 TABLET ORAL
Status: DISCONTINUED | OUTPATIENT
Start: 2024-01-02 | End: 2024-01-03 | Stop reason: HOSPADM

## 2024-01-02 RX ORDER — SODIUM CHLORIDE, SODIUM LACTATE, POTASSIUM CHLORIDE, AND CALCIUM CHLORIDE .6; .31; .03; .02 G/100ML; G/100ML; G/100ML; G/100ML
500 INJECTION, SOLUTION INTRAVENOUS
Status: DISCONTINUED | OUTPATIENT
Start: 2024-01-02 | End: 2024-01-03 | Stop reason: HOSPADM

## 2024-01-02 RX ORDER — ONDANSETRON 2 MG/ML
4 INJECTION INTRAMUSCULAR; INTRAVENOUS EVERY 4 HOURS PRN
Status: DISCONTINUED | OUTPATIENT
Start: 2024-01-02 | End: 2024-01-03 | Stop reason: HOSPADM

## 2024-01-02 RX ORDER — FOLIC ACID 1 MG/1
1 TABLET ORAL DAILY
Status: DISCONTINUED | OUTPATIENT
Start: 2024-01-02 | End: 2024-01-03 | Stop reason: HOSPADM

## 2024-01-02 RX ORDER — LORAZEPAM 1 MG/1
2 TABLET ORAL
Status: DISCONTINUED | OUTPATIENT
Start: 2024-01-02 | End: 2024-01-03 | Stop reason: HOSPADM

## 2024-01-02 RX ORDER — FLUTICASONE PROPIONATE 50 MCG
2 SPRAY, SUSPENSION (ML) NASAL DAILY
Status: DISCONTINUED | OUTPATIENT
Start: 2024-01-03 | End: 2024-01-02

## 2024-01-02 RX ORDER — AMOXICILLIN 250 MG
2 CAPSULE ORAL 2 TIMES DAILY
Status: DISCONTINUED | OUTPATIENT
Start: 2024-01-02 | End: 2024-01-03 | Stop reason: HOSPADM

## 2024-01-02 RX ORDER — LABETALOL HYDROCHLORIDE 5 MG/ML
10 INJECTION, SOLUTION INTRAVENOUS EVERY 4 HOURS PRN
Status: DISCONTINUED | OUTPATIENT
Start: 2024-01-02 | End: 2024-01-03 | Stop reason: HOSPADM

## 2024-01-02 RX ORDER — ACETAMINOPHEN 325 MG/1
650 TABLET ORAL EVERY 6 HOURS PRN
Status: DISCONTINUED | OUTPATIENT
Start: 2024-01-02 | End: 2024-01-03 | Stop reason: HOSPADM

## 2024-01-02 RX ORDER — PROMETHAZINE HYDROCHLORIDE 25 MG/1
12.5-25 SUPPOSITORY RECTAL EVERY 4 HOURS PRN
Status: DISCONTINUED | OUTPATIENT
Start: 2024-01-02 | End: 2024-01-03 | Stop reason: HOSPADM

## 2024-01-02 RX ORDER — OXYCODONE HYDROCHLORIDE 5 MG/1
5 TABLET ORAL
Status: DISCONTINUED | OUTPATIENT
Start: 2024-01-02 | End: 2024-01-03 | Stop reason: HOSPADM

## 2024-01-02 RX ORDER — GAUZE BANDAGE 2" X 2"
100 BANDAGE TOPICAL DAILY
Status: DISCONTINUED | OUTPATIENT
Start: 2024-01-02 | End: 2024-01-03 | Stop reason: HOSPADM

## 2024-01-02 RX ADMIN — SODIUM CHLORIDE, POTASSIUM CHLORIDE, SODIUM LACTATE AND CALCIUM CHLORIDE 1000 ML: 600; 310; 30; 20 INJECTION, SOLUTION INTRAVENOUS at 20:09

## 2024-01-02 RX ADMIN — PIPERACILLIN AND TAZOBACTAM 3.38 G: 3; .375 INJECTION, POWDER, FOR SOLUTION INTRAVENOUS at 16:53

## 2024-01-02 RX ADMIN — THERA TABS 1 TABLET: TAB at 16:43

## 2024-01-02 RX ADMIN — DOCUSATE SODIUM 50 MG AND SENNOSIDES 8.6 MG 2 TABLET: 8.6; 5 TABLET, FILM COATED ORAL at 18:32

## 2024-01-02 RX ADMIN — SODIUM CHLORIDE, POTASSIUM CHLORIDE, SODIUM LACTATE AND CALCIUM CHLORIDE: 600; 310; 30; 20 INJECTION, SOLUTION INTRAVENOUS at 16:59

## 2024-01-02 RX ADMIN — PANTOPRAZOLE SODIUM 40 MG: 40 INJECTION, POWDER, FOR SOLUTION INTRAVENOUS at 18:32

## 2024-01-02 RX ADMIN — Medication 100 MG: at 16:43

## 2024-01-02 RX ADMIN — FOLIC ACID 1 MG: 1 TABLET ORAL at 16:43

## 2024-01-02 RX ADMIN — POTASSIUM CHLORIDE 60 MEQ: 1500 TABLET, EXTENDED RELEASE ORAL at 20:09

## 2024-01-02 RX ADMIN — MAGNESIUM SULFATE HEPTAHYDRATE 2 G: 2 INJECTION, SOLUTION INTRAVENOUS at 20:17

## 2024-01-02 RX ADMIN — RIFAXIMIN 550 MG: 550 TABLET ORAL at 22:19

## 2024-01-02 RX ADMIN — OXYCODONE HYDROCHLORIDE 10 MG: 10 TABLET ORAL at 16:42

## 2024-01-02 ASSESSMENT — ENCOUNTER SYMPTOMS
DIZZINESS: 0
SHORTNESS OF BREATH: 0
FEVER: 0
PALPITATIONS: 0
DEPRESSION: 0
ABDOMINAL PAIN: 1
HEARTBURN: 1
COUGH: 0
DOUBLE VISION: 0
CHILLS: 0
VOMITING: 1
BLURRED VISION: 0
BRUISES/BLEEDS EASILY: 0
WEAKNESS: 1
MYALGIAS: 0
NAUSEA: 1
HEADACHES: 0

## 2024-01-02 ASSESSMENT — COGNITIVE AND FUNCTIONAL STATUS - GENERAL
DAILY ACTIVITIY SCORE: 24
SUGGESTED CMS G CODE MODIFIER MOBILITY: CH
MOBILITY SCORE: 24
SUGGESTED CMS G CODE MODIFIER DAILY ACTIVITY: CH

## 2024-01-02 ASSESSMENT — LIFESTYLE VARIABLES
HOW MANY TIMES IN THE PAST YEAR HAVE YOU HAD 5 OR MORE DRINKS IN A DAY: 4
ORIENTATION AND CLOUDING OF SENSORIUM: ORIENTED AND CAN DO SERIAL ADDITIONS
ANXIETY: NO ANXIETY (AT EASE)
AVERAGE NUMBER OF DAYS PER WEEK YOU HAVE A DRINK CONTAINING ALCOHOL: 3
ANXIETY: NO ANXIETY (AT EASE)
CONSUMPTION TOTAL: POSITIVE
AGITATION: NORMAL ACTIVITY
DO YOU DRINK ALCOHOL: YES
TOTAL SCORE: 4
TREMOR: *
TOTAL SCORE: 4
PAROXYSMAL SWEATS: NO SWEAT VISIBLE
ORIENTATION AND CLOUDING OF SENSORIUM: ORIENTED AND CAN DO SERIAL ADDITIONS
NAUSEA AND VOMITING: *
VISUAL DISTURBANCES: NOT PRESENT
PAROXYSMAL SWEATS: NO SWEAT VISIBLE
HAVE YOU EVER FELT YOU SHOULD CUT DOWN ON YOUR DRINKING: YES
TREMOR: NO TREMOR
TOTAL SCORE: 2
HAVE PEOPLE ANNOYED YOU BY CRITICIZING YOUR DRINKING: YES
AUDITORY DISTURBANCES: NOT PRESENT
AUDITORY DISTURBANCES: NOT PRESENT
NAUSEA AND VOMITING: NO NAUSEA AND NO VOMITING
EVER FELT BAD OR GUILTY ABOUT YOUR DRINKING: YES
SUBSTANCE_ABUSE: 0
ON A TYPICAL DAY WHEN YOU DRINK ALCOHOL HOW MANY DRINKS DO YOU HAVE: 3
DOES PATIENT WANT TO STOP DRINKING: YES
TOTAL SCORE: 3
HEADACHE, FULLNESS IN HEAD: NOT PRESENT
VISUAL DISTURBANCES: NOT PRESENT
AGITATION: NORMAL ACTIVITY
EVER HAD A DRINK FIRST THING IN THE MORNING TO STEADY YOUR NERVES TO GET RID OF A HANGOVER: YES
TOTAL SCORE: 4
HEADACHE, FULLNESS IN HEAD: NOT PRESENT

## 2024-01-02 ASSESSMENT — PATIENT HEALTH QUESTIONNAIRE - PHQ9
2. FEELING DOWN, DEPRESSED, IRRITABLE, OR HOPELESS: NOT AT ALL
1. LITTLE INTEREST OR PLEASURE IN DOING THINGS: NOT AT ALL
SUM OF ALL RESPONSES TO PHQ9 QUESTIONS 1 AND 2: 0

## 2024-01-02 ASSESSMENT — FIBROSIS 4 INDEX: FIB4 SCORE: 4.4

## 2024-01-02 ASSESSMENT — PAIN DESCRIPTION - PAIN TYPE
TYPE: ACUTE PAIN

## 2024-01-02 NOTE — PROGRESS NOTES
I discussed with Dr. Juan ER physician in [place]. Ms. Saldivar has a history of alcohol abuse with cirrhosis now drinking again. Her baseline bili appears to be about 6. She presented to [facility] 1/1 with abdominal pain and vomiting. Bili 9.9. CT revealed a complex pelvic mass suspicious for an ovarian neoplasm. Ultrasound was read as consistent with acute cholecystitis. She was given IV Zosyn. They do not have general surgery available at that hospital. Given her underlying severe liver disease, she is a high-risk surgical candidate and may be a good candidate for a cholecystostomy tube if she has cholecystitis. I have a call out to Dr. Stock, surgery to discuss.

## 2024-01-02 NOTE — PROGRESS NOTES
Direct admit pt arrived to T429. Pt AOx4 able to make needs known, Pain to abd 7/10,with shooting pain and pressure.   Dirt Admit, Triage Coordinator from [facility] notified.  Dr Calvin at bedside, Direct Admit Consent to evaluate and treat signed.   [id number]- Dr Reed at bedside

## 2024-01-02 NOTE — H&P
Hospital Medicine History & Physical Note    Date of Service  1/2/2024    Primary Care Physician  Pcp Pt States None    Consultants  General surgery (Dr. Stock)  GYN oncology (Dr. Dixon)    Code Status  Full Code    Chief Complaint  No chief complaint on file.  Abdominal pain, nausea and vomiting    History of Presenting Illness  Windy Saldivar is a 43 y.o. female with history of alcoholic cirrhosis who presented 1/2/2024 as a direct transfer from [facility] ER for evaluation of cholecystitis and neoplastic appearance of ovary.  Patient reported being clean and not drinking for 4 months, however reported intermittent binge drinking for the past month.  Her last drink was on December 31.  Patient reported having nausea, vomiting, abdominal discomfort earlier today, therefore went to ER for evaluation.    Workup from transferring ER included:  CBC: WBC 6.3, hemoglobin 9.5  CMP: Creatinine 0.58, potassium 3.0, ALT 36, AST 23, total bilirubin 9.1  Lipase 40  Lactic acid 2.3    Right upper quadrant ultrasound: Cholecystitis with wall thickening and gallbladder stones and sludge.    CTAP with contrast: Complex mass lesions in the pelvis suggesting ovarian neoplasm (L 7.7 x 5.1 x 5.0 cm, right solid appearing 8 x 8 x 5 cm) and further evaluation is suggested to rule out malignancy.  There is a small right pleural effusion which may be improved when compared with previous study.  Anxiety is less really apparent on the current study.  The spleen remains slightly enlarged and the appearance of the liver suggest cirrhosis with possible portal hypertension.  Cholelithiasis    Medications administered prior to transfer: 1 L NS bolus, magnesium sulfate 2 g, thiamine IV, potassium 40meq    Patient was seen by me at [facility] arrival, admitted to medicine service for further evaluation and treatment.  I consulted general surgery and GYN oncology, for evaluation to follow.    I discussed the plan of care with patient,  "bedside RN, pharmacy, and general surgery and GYN oncology .    Review of Systems  Review of Systems   Constitutional:  Negative for chills and fever.   HENT:  Negative for hearing loss and tinnitus.    Eyes:  Negative for blurred vision and double vision.   Respiratory:  Negative for cough and shortness of breath.    Cardiovascular:  Negative for chest pain and palpitations.   Gastrointestinal:  Positive for abdominal pain, heartburn, nausea and vomiting.   Genitourinary:  Negative for dysuria, frequency and hematuria.   Musculoskeletal:  Negative for joint pain and myalgias.   Skin:  Negative for itching and rash.   Neurological:  Positive for weakness. Negative for dizziness and headaches.   Endo/Heme/Allergies:  Negative for environmental allergies. Does not bruise/bleed easily.   Psychiatric/Behavioral:  Negative for depression and substance abuse.    All other systems reviewed and are negative.      Past Medical History   has a past medical history of ALC (alcoholic liver cirrhosis) (HCC) (7/17/2023), ASTHMA, Gallstone, Hypertension, Low back pain, and PID (acute pelvic inflammatory disease) (05/23/2014).    Surgical History   has a past surgical history that includes IUD Removal (5/25/2014) and pr upper gi endoscopy,diagnosis (N/A, 7/19/2023).     Family History  family history is not on file.   Family history reviewed with patient. There is family history that is pertinent to the chief complaint.   Mother had ovarian cancer in her 30s    Social History   reports that she has been smoking cigarettes. She does not have any smokeless tobacco history on file. She reports current alcohol use. She reports that she does not use drugs.    Allergies  Allergies   Allergen Reactions    Aspirin Shortness of Breath     Shortness of breath, asthma exacerbation, and \"a congested feeling.\"       Medications  Prior to Admission Medications   Prescriptions Last Dose Informant Patient Reported? Taking?   FLOVENT  " MCG/ACT Aerosol  Patient's Home Pharmacy Yes No   Sig: Inhale 1 Puff 2 times a day.   MAGNESIUM-ZINC PO  Patient Yes No   Sig: Take 1 Tablet by mouth every day.   albuterol (PROVENTIL) 2.5mg/3ml Nebu Soln solution for nebulization  Patient No No   Sig: 3 mL by Nebulization route every four hours as needed for Shortness of Breath.   albuterol (VENTOLIN OR PROVENTIL) 108 (90 BASE) MCG/ACT Aero Soln inhalation aerosol  Patient No No   Sig: Inhale 2 Puffs by mouth every four hours as needed for Shortness of Breath.   fluticasone (FLONASE) 50 MCG/ACT nasal spray  Patient's Home Pharmacy Yes No   Sig: Administer 2 Sprays into affected nostril(S) every day.   furosemide (LASIX) 40 MG Tab   No No   Sig: Take 0.5 Tablets by mouth every day.   ipratropium (ATROVENT) 0.03 % Solution  Patient's Home Pharmacy Yes No   Sig: Administer 2 Sprays into affected nostril(S) 2 times a day.   lactulose 20 GM/30ML Solution   No No   Sig: Take 30 mL by mouth 3 times a day.   spironolactone (ALDACTONE) 25 MG Tab   No No   Sig: Take 1 Tablet by mouth every day.   therapeutic multivitamin-minerals (THERAGRAN-M) Tab  Patient Yes No   Sig: Take 1 Tablet by mouth every day.      Facility-Administered Medications: None       Physical Exam  Temp:  [36.7 °C (98.1 °F)] 36.7 °C (98.1 °F)  Pulse:  [85] 85  Resp:  [18] 18  BP: (112)/(74) 112/74  SpO2:  [99 %] 99 %                          Physical Exam  Vitals and nursing note reviewed.   Constitutional:       General: She is not in acute distress.     Appearance: She is obese.   HENT:      Head: Atraumatic.      Nose: Nose normal.      Mouth/Throat:      Mouth: Mucous membranes are dry.      Pharynx: Oropharynx is clear.   Eyes:      General: Scleral icterus present.   Cardiovascular:      Rate and Rhythm: Normal rate and regular rhythm.      Pulses: Normal pulses.      Heart sounds:      No friction rub.   Pulmonary:      Effort: No respiratory distress.      Breath sounds: No wheezing or rales.  "  Chest:      Chest wall: No tenderness.   Abdominal:      General: There is distension.      Tenderness: There is abdominal tenderness. There is no guarding or rebound.   Musculoskeletal:         General: Normal range of motion.      Cervical back: Neck supple. No tenderness.      Right lower leg: No edema.      Left lower leg: No edema.   Skin:     General: Skin is dry.      Capillary Refill: Capillary refill takes less than 2 seconds.      Coloration: Skin is jaundiced.   Neurological:      General: No focal deficit present.      Mental Status: She is alert and oriented to person, place, and time.   Psychiatric:         Mood and Affect: Mood normal.         Laboratory:  Recent Labs     01/02/24  1531   WBC 6.3   RBC 2.55*   HEMOGLOBIN 8.8*   HEMATOCRIT 26.0*   .0*   MCH 34.5*   MCHC 33.8   RDW 51.7*   PLATELETCT 106*   MPV 10.4     Recent Labs     01/02/24  1531   SODIUM 135   POTASSIUM 3.3*   CHLORIDE 106   CO2 20   GLUCOSE 109*   BUN 9   CREATININE 0.35*   CALCIUM 8.2*     Recent Labs     01/02/24  1531   ALTSGPT 31   ASTSGOT 76*   ALKPHOSPHAT 119*   TBILIRUBIN 8.0*   DBILIRUBIN 1.8*   LIPASE 46   GLUCOSE 109*     Recent Labs     01/02/24  1531   INR 2.00*     No results for input(s): \"NTPROBNP\" in the last 72 hours.      No results for input(s): \"TROPONINT\" in the last 72 hours.    Imaging:  No orders to display       no X-Ray or EKG requiring interpretation    Assessment/Plan:  Justification for Admission Status  I anticipate this patient  will require at least 2 midnights hospitalization, therefore appropriate for inpatient status.      * Cholecystitis- (present on admission)  Assessment & Plan  Noted on right upper quadrant ultrasound from transferring facility  Discussed with general surgery, clinically does not appear as cholecystitis  Discontinue antibiotic  Surgery recommendations appreciated    Hyperbilirubinemia  Assessment & Plan  Likely secondary to EtOH    Ovarian mass  Assessment & " Plan  CTAP with contrast: Complex mass lesions in the pelvis suggesting ovarian neoplasm (L 7.7 x 5.1 x 5.0 cm, right solid appearing 8 x 8 x 5 cm) and further evaluation is suggested to rule out malignancy    lower quadrant abdominal pain  Family history ovarian cancer  Check tumor markers  GYN oncology consulted, follow-up appreciated    Thickening of wall of gallbladder- (present on admission)  Assessment & Plan  As above    Alcoholic cirrhosis (HCC)- (present on admission)  Assessment & Plan  Alcohol abstinence discussed  Continue Lasix/spironolactone/lactulose/Xifaxan as tolerated    Alcohol dependence (HCC)- (present on admission)  Assessment & Plan  Cessation counseling provided    Hypokalemia  Assessment & Plan  Replace  Replace Mg        VTE prophylaxis: SCDs/TEDs

## 2024-01-03 ENCOUNTER — PHARMACY VISIT (OUTPATIENT)
Dept: PHARMACY | Facility: MEDICAL CENTER | Age: 44
End: 2024-01-03
Payer: COMMERCIAL

## 2024-01-03 VITALS
DIASTOLIC BLOOD PRESSURE: 77 MMHG | HEIGHT: 59 IN | SYSTOLIC BLOOD PRESSURE: 117 MMHG | TEMPERATURE: 97.7 F | HEART RATE: 80 BPM | BODY MASS INDEX: 30.53 KG/M2 | OXYGEN SATURATION: 93 % | WEIGHT: 151.46 LBS | RESPIRATION RATE: 18 BRPM

## 2024-01-03 PROBLEM — D69.6 THROMBOCYTOPENIA (HCC): Status: ACTIVE | Noted: 2024-01-03

## 2024-01-03 PROBLEM — K81.9 CHOLECYSTITIS: Status: RESOLVED | Noted: 2024-01-02 | Resolved: 2024-01-03

## 2024-01-03 PROBLEM — D58.9 HEMOLYTIC ANEMIA (HCC): Status: ACTIVE | Noted: 2024-01-03

## 2024-01-03 LAB
ALBUMIN SERPL BCP-MCNC: 3 G/DL (ref 3.2–4.9)
ALBUMIN/GLOB SERPL: 0.9 G/DL
ALP SERPL-CCNC: 107 U/L (ref 30–99)
ALT SERPL-CCNC: 28 U/L (ref 2–50)
AMMONIA PLAS-SCNC: 48 UMOL/L (ref 11–45)
ANION GAP SERPL CALC-SCNC: 9 MMOL/L (ref 7–16)
AST SERPL-CCNC: 60 U/L (ref 12–45)
BASOPHILS # BLD AUTO: 0.7 % (ref 0–1.8)
BASOPHILS # BLD: 0.04 K/UL (ref 0–0.12)
BILIRUB SERPL-MCNC: 6.2 MG/DL (ref 0.1–1.5)
BUN SERPL-MCNC: 7 MG/DL (ref 8–22)
CALCIUM ALBUM COR SERPL-MCNC: 8.6 MG/DL (ref 8.5–10.5)
CALCIUM SERPL-MCNC: 7.8 MG/DL (ref 8.5–10.5)
CEA SERPL-MCNC: 2 NG/ML (ref 0–3)
CHLORIDE SERPL-SCNC: 105 MMOL/L (ref 96–112)
CO2 SERPL-SCNC: 20 MMOL/L (ref 20–33)
CREAT SERPL-MCNC: 0.29 MG/DL (ref 0.5–1.4)
DAT C3D-SP REAG RBC QL: NORMAL
DAT IGG-SP REAG RBC QL: NORMAL
EOSINOPHIL # BLD AUTO: 0.34 K/UL (ref 0–0.51)
EOSINOPHIL NFR BLD: 5.8 % (ref 0–6.9)
ERYTHROCYTE [DISTWIDTH] IN BLOOD BY AUTOMATED COUNT: 50.7 FL (ref 35.9–50)
FERRITIN SERPL-MCNC: 244 NG/ML (ref 10–291)
FOLATE SERPL-MCNC: 29.1 NG/ML
GFR SERPLBLD CREATININE-BSD FMLA CKD-EPI: 135 ML/MIN/1.73 M 2
GLOBULIN SER CALC-MCNC: 3.5 G/DL (ref 1.9–3.5)
GLUCOSE SERPL-MCNC: 86 MG/DL (ref 65–99)
HCT VFR BLD AUTO: 25 % (ref 37–47)
HGB BLD-MCNC: 8.5 G/DL (ref 12–16)
HGB RETIC QN AUTO: 37.7 PG/CELL (ref 29–35)
IMM GRANULOCYTES # BLD AUTO: 0.03 K/UL (ref 0–0.11)
IMM GRANULOCYTES NFR BLD AUTO: 0.5 % (ref 0–0.9)
IMM RETICS NFR: 16.8 % (ref 2.6–16.1)
IRON SATN MFR SERPL: 43 % (ref 15–55)
IRON SERPL-MCNC: 100 UG/DL (ref 40–170)
LDH SERPL L TO P-CCNC: 234 U/L (ref 107–266)
LYMPHOCYTES # BLD AUTO: 1.03 K/UL (ref 1–4.8)
LYMPHOCYTES NFR BLD: 17.6 % (ref 22–41)
MAGNESIUM SERPL-MCNC: 1.6 MG/DL (ref 1.5–2.5)
MCH RBC QN AUTO: 34.6 PG (ref 27–33)
MCHC RBC AUTO-ENTMCNC: 34 G/DL (ref 32.2–35.5)
MCV RBC AUTO: 101.6 FL (ref 81.4–97.8)
MONOCYTES # BLD AUTO: 0.22 K/UL (ref 0–0.85)
MONOCYTES NFR BLD AUTO: 3.8 % (ref 0–13.4)
NEUTROPHILS # BLD AUTO: 4.18 K/UL (ref 1.82–7.42)
NEUTROPHILS NFR BLD: 71.6 % (ref 44–72)
NRBC # BLD AUTO: 0 K/UL
NRBC BLD-RTO: 0 /100 WBC (ref 0–0.2)
PHOSPHATE SERPL-MCNC: 2.7 MG/DL (ref 2.5–4.5)
PLATELET # BLD AUTO: 105 K/UL (ref 164–446)
PMV BLD AUTO: 10.4 FL (ref 9–12.9)
POTASSIUM SERPL-SCNC: 3.9 MMOL/L (ref 3.6–5.5)
PROT SERPL-MCNC: 6.5 G/DL (ref 6–8.2)
RBC # BLD AUTO: 2.46 M/UL (ref 4.2–5.4)
RETICS # AUTO: 0.11 M/UL (ref 0.04–0.12)
RETICS/RBC NFR: 4.6 % (ref 0.8–2.6)
SODIUM SERPL-SCNC: 134 MMOL/L (ref 135–145)
TIBC SERPL-MCNC: 234 UG/DL (ref 250–450)
TSH SERPL DL<=0.005 MIU/L-ACNC: 4.33 UIU/ML (ref 0.38–5.33)
UIBC SERPL-MCNC: 134 UG/DL (ref 110–370)
VIT B12 SERPL-MCNC: 1632 PG/ML (ref 211–911)
WBC # BLD AUTO: 5.8 K/UL (ref 4.8–10.8)

## 2024-01-03 PROCEDURE — 84443 ASSAY THYROID STIM HORMONE: CPT

## 2024-01-03 PROCEDURE — 86880 COOMBS TEST DIRECT: CPT | Mod: 91

## 2024-01-03 PROCEDURE — 82746 ASSAY OF FOLIC ACID SERUM: CPT

## 2024-01-03 PROCEDURE — 82140 ASSAY OF AMMONIA: CPT

## 2024-01-03 PROCEDURE — 700102 HCHG RX REV CODE 250 W/ 637 OVERRIDE(OP): Performed by: STUDENT IN AN ORGANIZED HEALTH CARE EDUCATION/TRAINING PROGRAM

## 2024-01-03 PROCEDURE — 82378 CARCINOEMBRYONIC ANTIGEN: CPT

## 2024-01-03 PROCEDURE — 700111 HCHG RX REV CODE 636 W/ 250 OVERRIDE (IP): Performed by: STUDENT IN AN ORGANIZED HEALTH CARE EDUCATION/TRAINING PROGRAM

## 2024-01-03 PROCEDURE — 82607 VITAMIN B-12: CPT

## 2024-01-03 PROCEDURE — 84100 ASSAY OF PHOSPHORUS: CPT

## 2024-01-03 PROCEDURE — RXMED WILLOW AMBULATORY MEDICATION CHARGE: Performed by: STUDENT IN AN ORGANIZED HEALTH CARE EDUCATION/TRAINING PROGRAM

## 2024-01-03 PROCEDURE — 99239 HOSP IP/OBS DSCHRG MGMT >30: CPT | Performed by: STUDENT IN AN ORGANIZED HEALTH CARE EDUCATION/TRAINING PROGRAM

## 2024-01-03 PROCEDURE — 83010 ASSAY OF HAPTOGLOBIN QUANT: CPT

## 2024-01-03 PROCEDURE — 83735 ASSAY OF MAGNESIUM: CPT

## 2024-01-03 PROCEDURE — 700105 HCHG RX REV CODE 258: Performed by: STUDENT IN AN ORGANIZED HEALTH CARE EDUCATION/TRAINING PROGRAM

## 2024-01-03 PROCEDURE — 83550 IRON BINDING TEST: CPT

## 2024-01-03 PROCEDURE — A9270 NON-COVERED ITEM OR SERVICE: HCPCS | Performed by: STUDENT IN AN ORGANIZED HEALTH CARE EDUCATION/TRAINING PROGRAM

## 2024-01-03 PROCEDURE — C9113 INJ PANTOPRAZOLE SODIUM, VIA: HCPCS | Performed by: STUDENT IN AN ORGANIZED HEALTH CARE EDUCATION/TRAINING PROGRAM

## 2024-01-03 PROCEDURE — 85046 RETICYTE/HGB CONCENTRATE: CPT

## 2024-01-03 PROCEDURE — 80053 COMPREHEN METABOLIC PANEL: CPT

## 2024-01-03 PROCEDURE — 83540 ASSAY OF IRON: CPT

## 2024-01-03 PROCEDURE — 83615 LACTATE (LD) (LDH) ENZYME: CPT

## 2024-01-03 PROCEDURE — 82728 ASSAY OF FERRITIN: CPT

## 2024-01-03 PROCEDURE — 85025 COMPLETE CBC W/AUTO DIFF WBC: CPT

## 2024-01-03 RX ORDER — FOLIC ACID 1 MG/1
1 TABLET ORAL DAILY
Qty: 30 TABLET | Refills: 2 | Status: SHIPPED | OUTPATIENT
Start: 2024-01-04

## 2024-01-03 RX ORDER — DULOXETIN HYDROCHLORIDE 30 MG/1
30 CAPSULE, DELAYED RELEASE ORAL DAILY
Qty: 30 CAPSULE | Refills: 1 | Status: SHIPPED | OUTPATIENT
Start: 2024-01-03 | End: 2024-01-03

## 2024-01-03 RX ORDER — SPIRONOLACTONE 50 MG/1
50 TABLET, FILM COATED ORAL DAILY
Qty: 30 TABLET | Refills: 1 | Status: SHIPPED | OUTPATIENT
Start: 2024-01-03

## 2024-01-03 RX ORDER — DULOXETIN HYDROCHLORIDE 30 MG/1
30 CAPSULE, DELAYED RELEASE ORAL DAILY
Status: DISCONTINUED | OUTPATIENT
Start: 2024-01-03 | End: 2024-01-03 | Stop reason: HOSPADM

## 2024-01-03 RX ORDER — OXYCODONE HYDROCHLORIDE 5 MG/1
5 TABLET ORAL EVERY 6 HOURS PRN
Qty: 20 TABLET | Refills: 0 | Status: SHIPPED | OUTPATIENT
Start: 2024-01-03 | End: 2024-01-03

## 2024-01-03 RX ORDER — LANOLIN ALCOHOL/MO/W.PET/CERES
100 CREAM (GRAM) TOPICAL DAILY
Qty: 30 TABLET | Refills: 2 | Status: SHIPPED | OUTPATIENT
Start: 2024-01-04 | End: 2024-01-03

## 2024-01-03 RX ORDER — OXYCODONE HYDROCHLORIDE 5 MG/1
5 TABLET ORAL EVERY 6 HOURS PRN
Qty: 20 TABLET | Refills: 0 | Status: SHIPPED | OUTPATIENT
Start: 2024-01-03 | End: 2024-01-08

## 2024-01-03 RX ORDER — FOLIC ACID 1 MG/1
1 TABLET ORAL DAILY
Qty: 30 TABLET | Refills: 2 | Status: SHIPPED | OUTPATIENT
Start: 2024-01-04 | End: 2024-01-03

## 2024-01-03 RX ORDER — DULOXETIN HYDROCHLORIDE 30 MG/1
30 CAPSULE, DELAYED RELEASE ORAL DAILY
Qty: 30 CAPSULE | Refills: 1 | Status: SHIPPED | OUTPATIENT
Start: 2024-01-03

## 2024-01-03 RX ORDER — LANOLIN ALCOHOL/MO/W.PET/CERES
100 CREAM (GRAM) TOPICAL DAILY
Qty: 30 TABLET | Refills: 2 | Status: SHIPPED | OUTPATIENT
Start: 2024-01-04

## 2024-01-03 RX ADMIN — Medication 100 MG: at 04:29

## 2024-01-03 RX ADMIN — HYDROMORPHONE HYDROCHLORIDE 0.5 MG: 1 INJECTION, SOLUTION INTRAMUSCULAR; INTRAVENOUS; SUBCUTANEOUS at 13:18

## 2024-01-03 RX ADMIN — FUROSEMIDE 20 MG: 20 TABLET ORAL at 05:12

## 2024-01-03 RX ADMIN — DULOXETINE HYDROCHLORIDE 30 MG: 30 CAPSULE, DELAYED RELEASE ORAL at 16:39

## 2024-01-03 RX ADMIN — SODIUM CHLORIDE, POTASSIUM CHLORIDE, SODIUM LACTATE AND CALCIUM CHLORIDE 1000 ML: 600; 310; 30; 20 INJECTION, SOLUTION INTRAVENOUS at 05:31

## 2024-01-03 RX ADMIN — PANTOPRAZOLE SODIUM 40 MG: 40 INJECTION, POWDER, FOR SOLUTION INTRAVENOUS at 04:29

## 2024-01-03 RX ADMIN — OXYCODONE HYDROCHLORIDE 10 MG: 10 TABLET ORAL at 12:18

## 2024-01-03 RX ADMIN — RIFAXIMIN 550 MG: 550 TABLET ORAL at 04:29

## 2024-01-03 RX ADMIN — FLUTICASONE PROPIONATE 110 MCG: 110 AEROSOL, METERED RESPIRATORY (INHALATION) at 04:30

## 2024-01-03 RX ADMIN — HYDROMORPHONE HYDROCHLORIDE 0.5 MG: 1 INJECTION, SOLUTION INTRAMUSCULAR; INTRAVENOUS; SUBCUTANEOUS at 05:55

## 2024-01-03 RX ADMIN — FLUTICASONE PROPIONATE 110 MCG: 110 AEROSOL, METERED RESPIRATORY (INHALATION) at 01:22

## 2024-01-03 RX ADMIN — OXYCODONE HYDROCHLORIDE 10 MG: 10 TABLET ORAL at 01:21

## 2024-01-03 RX ADMIN — ONDANSETRON 4 MG: 2 INJECTION INTRAMUSCULAR; INTRAVENOUS at 13:59

## 2024-01-03 RX ADMIN — THERA TABS 1 TABLET: TAB at 04:29

## 2024-01-03 RX ADMIN — SPIRONOLACTONE 25 MG: 50 TABLET ORAL at 05:12

## 2024-01-03 RX ADMIN — DOCUSATE SODIUM 50 MG AND SENNOSIDES 8.6 MG 2 TABLET: 8.6; 5 TABLET, FILM COATED ORAL at 04:29

## 2024-01-03 RX ADMIN — ALBUTEROL SULFATE 2 PUFF: 90 AEROSOL, METERED RESPIRATORY (INHALATION) at 01:04

## 2024-01-03 RX ADMIN — FOLIC ACID 1 MG: 1 TABLET ORAL at 04:29

## 2024-01-03 ASSESSMENT — LIFESTYLE VARIABLES
AGITATION: NORMAL ACTIVITY
TREMOR: *
AUDITORY DISTURBANCES: NOT PRESENT
PAROXYSMAL SWEATS: NO SWEAT VISIBLE
VISUAL DISTURBANCES: NOT PRESENT
ANXIETY: NO ANXIETY (AT EASE)
NAUSEA AND VOMITING: NO NAUSEA AND NO VOMITING
NAUSEA AND VOMITING: NO NAUSEA AND NO VOMITING
AUDITORY DISTURBANCES: NOT PRESENT
PAROXYSMAL SWEATS: NO SWEAT VISIBLE
ORIENTATION AND CLOUDING OF SENSORIUM: ORIENTED AND CAN DO SERIAL ADDITIONS
TREMOR: *
HEADACHE, FULLNESS IN HEAD: NOT PRESENT
TOTAL SCORE: 2
AGITATION: NORMAL ACTIVITY
ORIENTATION AND CLOUDING OF SENSORIUM: ORIENTED AND CAN DO SERIAL ADDITIONS
HEADACHE, FULLNESS IN HEAD: NOT PRESENT
ANXIETY: NO ANXIETY (AT EASE)
TOTAL SCORE: 2
VISUAL DISTURBANCES: NOT PRESENT

## 2024-01-03 ASSESSMENT — PAIN DESCRIPTION - PAIN TYPE
TYPE: ACUTE PAIN
TYPE: ACUTE PAIN;SURGICAL PAIN
TYPE: ACUTE PAIN

## 2024-01-03 NOTE — DISCHARGE SUMMARY
Discharge Summary    CHIEF COMPLAINT ON ADMISSION  Nausea, vomiting, and lower abdominal pain      Reason for Admission  Concern for Acute Cholecystitis and Ovarian Neoplasm     Admission Date  1/2/2024    CODE STATUS  Prior     HPI & HOSPITAL COURSE  Windy Saldivar is a 43 y.o. female with history of alcoholic cirrhosis who presented 1/2/2024 as a direct transfer from [facility] for evaluation of possible cholecystitis and neoplastic appearance of ovary.  Patient reported being clean and not drinking for 4 months, however reported intermittent binge drinking for the past month.  Her last drink was on December 31.  Patient reported having nausea, vomiting, abdominal discomfort earlier today, therefore went to ER for evaluation.     At the outside hospital, patient was noted to have a hemoglobin of 9.5.  Lipase of 40.  Elevated total bilirubin of 9.1 and a lactic acid of 2.3.  Showed cholecystitis with wall thickening and gallbladder stones and sludge.  CT scan of the abdomen pelvis with contrast showed complex mass lesions in the pelvis suggesting ovarian neoplasm (7.7 x 5.1 x 5.0 cm, right solid appearing 8 x 8 x 5 cm) and further evaluation was suggested to rule out malignancy.   Also noted was a small right effusion which appeared to be improved compared to previous study.  Stain remained slightly enlarged and appearance of liver suggesting cirrhosis with possible portal hypertension.  Cholelithiasis.    Patient was transferred to [facility] for higher level care for general surgery and gynecology oncology support.      Patient was evaluated by Dr. Marta LINARES of general surgery, who felt that the patient's mild gallbladder thickening and symptoms were not consistent with acute cholecystitis given that she had a normal white count no significant right upper quadrant pain or postprandial illness.  It was felt that the patient's thickened gallbladder was likely related to ascites and  cirrhosis rather than infection.  No further intervention was recommended.      Patient was evaluated by gynecologic oncology, who felt that the patient's mass may represent a benign ovarian neoplasm such as serous or mucinous cystadenoma, intermittent urinoma, or hemorrhagic cyst, tubo-ovarian abscess, paratubal cyst, hydrosalpinx, borderline tumor, or ovarian malignancy given that the patient's  was elevated at 160 (35 upper limit of normal).  Dr. Corby Dixon personally reviewed the patient's imaging studies, I feel that the suspicion for malignancy was low at this time given the relative stability and appears compared to prior scan in July 2023.  The RT adnexal masses appeared more solid during this time.  Due to remote history of pelvic inflammatory disease, if it was felt that a tubo-ovarian abscess was higher under differential.    Patient's symptoms improved, and given patient's clinical stability and the patient having [employer] insurance, it was recommended that the patient follow-up with the gynecologist or oncologist in [place] for definitive management of the ovarian masses.  Patient was agreeable to being discharged to home with follow-up with an oncologist as per recommendations.  She stated that she would be looking for a gynecologist and [place], [place] which was closer to her home in [place], [place].    Patient was also noted to have a stable anemia with hemoglobin 8.5.  Total bilirubin still elevated at 7.8 (previously noted to be 8-8.4 in July 2023).  However, the patient's bilirubin was noted to be primarily indirect this time suggestive of a hemolytic anemia.  However, LDH was normal at 234.  Haptoglobin was ordered and pending.  Previously was low at 21 on 7/17/2023.  Patient was encouraged to follow-up with a hematologist following her discharge as well.    Patient's MELD score was noted to be 24 estimated 90-day mortality of 14 to 15%.  Patient's spironolactone was increased to  50 mg daily pleural effusion.    Patient was counseled on alcohol cessation and referred to Alcoholics Anonymous.  She was started on duloxetine due to persistent lower abdominal pain and to minimize oxycodone use.    Therefore, she is discharged in good and stable condition to home with close outpatient follow-up.    The patient recovered much more quickly than anticipated on admission.    Discharge Date  1/3/2024    FOLLOW UP ITEMS POST DISCHARGE  -Follow-up with primary care provider in 3 to 5 days.  -Follow-up with gynecologist or gynecologic oncologist.  -Follow-up with hematologist regarding persistent anemia.  -Follow-up with gastroenterologist regarding alcoholic cirrhosis.    DISCHARGE DIAGNOSES  Principal Problem (Resolved):    Cholecystitis (POA: Yes)  Active Problems:    Hemolytic anemia (HCC) (POA: Yes)    Pleural effusion on right (POA: Yes)    Alcohol dependence (HCC) (POA: Yes)    Alcoholic cirrhosis (HCC) (POA: Yes)    Thickening of wall of gallbladder (POA: Yes)    Ovarian mass (POA: Yes)    Hyperbilirubinemia (POA: Yes)    Hypokalemia (POA: Yes)    Thrombocytopenia (HCC) (POA: Yes)      FOLLOW UP  No future appointments.  Moses Yan M.D.  [address] [place] [id number]  [phone]    Schedule an appointment as soon as possible for a visit in 1 week(s)        MEDICATIONS ON DISCHARGE     Medication List        Start taking these medications        Instructions   DULoxetine 30 MG Cpep  Commonly known as: Cymbalta   Take 1 Capsule by mouth every day.  Dose: 30 mg     folic acid 1 MG Tabs  Start taking on: January 4, 2024  Commonly known as: Folvite   Take 1 Tablet by mouth every day.  Dose: 1 mg     multivitamin Tabs  Start taking on: January 4, 2024   Take 1 Tablet by mouth every day.  Dose: 1 Tablet     oxyCODONE immediate-release 5 MG Tabs  Commonly known as: Roxicodone   Take 1 Tablet by mouth every 6 hours as needed for Severe Pain for up to 5 days.  Dose: 5 mg     riFAXIMin 550 MG Tabs  "tablet  Commonly known as: Xifaxan   Take 1 Tablet by mouth 2 times a day.  Dose: 550 mg     thiamine 100 MG tablet  Start taking on: January 4, 2024  Commonly known as: Thiamine   Take 1 Tablet by mouth every day.  Dose: 100 mg            Change how you take these medications        Instructions   spironolactone 50 MG Tabs  What changed:   medication strength  how much to take  Commonly known as: Aldactone   Take 1 Tablet by mouth every day.  Dose: 50 mg            Continue taking these medications        Instructions   * albuterol 2.5mg/3ml Nebu solution for nebulization  Commonly known as: Proventil   3 mL by Nebulization route every four hours as needed for Shortness of Breath.  Dose: 2.5 mg     * albuterol 108 (90 Base) MCG/ACT Aers inhalation aerosol   Inhale 2 Puffs by mouth every four hours as needed for Shortness of Breath.  Dose: 2 Puff     Flovent  MCG/ACT Aero  Generic drug: fluticasone   Inhale 1 Puff 2 times a day.  Dose: 1 Puff     fluticasone 50 MCG/ACT nasal spray  Commonly known as: Flonase   Administer 2 Sprays into affected nostril(S) every day.  Dose: 2 Spray     furosemide 40 MG Tabs  Commonly known as: Lasix   Take 0.5 Tablets by mouth every day.  Dose: 20 mg     ipratropium 0.03 % Soln  Commonly known as: Atrovent   Administer 2 Sprays into affected nostril(S) 2 times a day.  Dose: 2 Spray     lactulose 20 GM/30ML Soln   Take 30 mL by mouth 3 times a day.  Dose: 30 mL     MAGNESIUM-ZINC PO   Take 1 Tablet by mouth every day.  Dose: 1 Tablet      * This list has 2 medication(s) that are the same as other medications prescribed for you. Read the directions carefully, and ask your doctor or other care provider to review them with you.       Stop taking these medications      therapeutic multivitamin-minerals Tabs              Allergies  Allergies   Allergen Reactions    Aspirin Shortness of Breath     Shortness of breath, asthma exacerbation, and \"a congested feeling.\" "       DIET  Regular      ACTIVITY  As tolerated.  Weight bearing as tolerated    CONSULTATIONS  General surgery  Gynecologic oncology    PROCEDURES  None    LABORATORY  Lab Results   Component Value Date    SODIUM 134 (L) 01/03/2024    POTASSIUM 3.9 01/03/2024    CHLORIDE 105 01/03/2024    CO2 20 01/03/2024    GLUCOSE 86 01/03/2024    BUN 7 (L) 01/03/2024    CREATININE 0.29 (L) 01/03/2024        Lab Results   Component Value Date    WBC 5.8 01/03/2024    HEMOGLOBIN 8.5 (L) 01/03/2024    HEMATOCRIT 25.0 (L) 01/03/2024    PLATELETCT 105 (L) 01/03/2024        Total time of the discharge process exceeds 50 minutes.

## 2024-01-03 NOTE — ASSESSMENT & PLAN NOTE
Noted on right upper quadrant ultrasound from transferring facility  Discussed with general surgery, clinically does not appear as cholecystitis  Discontinue antibiotic  Surgery recommendations appreciated

## 2024-01-03 NOTE — DISCHARGE INSTRUCTIONS
Thank you for coming to [facility].  It has been my pleasure to care for you!    Please establish care with a OB/GYN oncologist in [place] for further evaluation of your pelvic mass.

## 2024-01-03 NOTE — PROGRESS NOTES
4 Eyes Skin Assessment Completed by VICTORIA Hardy and VICTORIA Dominguez.    Head WDL  Ears WDL  Nose WDL  Mouth WDL  Neck WDL  Breast/Chest WDL  Shoulder Blades WDL  Spine WDL  (R) Arm/Elbow/Hand WDL  (L) Arm/Elbow/Hand WDL  Abdomen WDL  Groin WDL  Scrotum/Coccyx/Buttocks WDL  (R) Leg WDL  (L) Leg WDL  (R) Heel/Foot/Toe WDL  (L) Heel/Foot/Toe WDL          Devices In Places SCD's      Interventions In Place N/A    Possible Skin Injury No    Pictures Uploaded Into Epic N/A  Wound Consult Placed N/A  RN Wound Prevention Protocol Ordered No

## 2024-01-03 NOTE — DISCHARGE PLANNING
Case Management Discharge Planning    Admission Date: 1/2/2024  GMLOS: 2.3  ALOS: 1    6-Clicks ADL Score: 24  6-Clicks Mobility Score: 24      Anticipated Discharge Dispo: Discharge Disposition: Discharged to home/self care (01)    Action(s) Taken: DC Assessment Complete (See below)    Escalations Completed: None    Medically Clear: No    Next Steps: CM to follow up with IDT regarding discharge planning needs/barriers    Barriers to Discharge: Medical clearance    Is the patient up for discharge tomorrow: No      Care Transition Team Assessment    Case management role discussed with patient; patient verbalized understanding of case management role  Demographics verified  Patient is a 43 year old admitted to [facility] for cholecystitis and neoplastic appearance of ovary  Patient states she lives in a 1-story home with her significant other and his mother; 4-5 steps to enter the residence  Patient reports a monthly income of $2000/month  Patient's preferred pharmacy is [facility] in [place], [place]  Prior to admission, patient states she was independent with IADLS/ADLS; denies baseline DME; denies home O2 use  Patient states she was sober for 4 months and began drinking heavily again; patient states she was drinking 3-double shots of vodka/day and reports her last drink was 12/31  Patient reports suffering from depression/anxiety and is actively being treated for both through her GI MD (Harper Martinez)  RNCM provided patient with resources on alcohol cessation at bedside    Information Source  Orientation Level: Oriented X4  Information Given By: Patient  Informant's Name: Windy  Who is responsible for making decisions for patient? : Patient    Readmission Evaluation  Is this a readmission?: No    Elopement Risk  Legal Hold: No  Ambulatory or Self Mobile in Wheelchair: Yes  Disoriented: No  Psychiatric Symptoms: None  History of Wandering: No  Elopement this Admit: No  Vocalizing Wanting to Leave: No  Displays  Behaviors, Body Language Wanting to Leave: No-Not at Risk for Elopement  Elopement Risk: Not at Risk for Elopement    Interdisciplinary Discharge Planning  Does Admitting Nurse Feel This Could be a Complex Discharge?: No  Primary Care Physician: Moses Yan  Lives with - Patient's Self Care Capacity: Significant Other, Parents  Patient or legal guardian wants to designate a caregiver: No  Caregiver name: Mandie  Caregiver contact info: [phone]  ([facility]) Authorization for Release of Health Information has been completed: Yes  Support Systems: Family Member(s), Friends / Neighbors, Spouse / Significant Other  Housing / Facility: [address]  Do You Take your Prescribed Medications Regularly: Yes  Able to Return to Previous ADL's: Yes  Mobility Issues: No  Prior Services: None, Home-Independent  Patient Prefers to be Discharged to:: Home  Assistance Needed: No  Durable Medical Equipment: Not Applicable    Discharge Preparedness  What is your plan after discharge?: Home with help  What are your discharge supports?: Spouse, Parent  Prior Functional Level: Ambulatory, Drives Self, Independent with Activities of Daily Living, Independent with Medication Management  Difficulity with ADLs: None  Difficulity with IADLs: None    Functional Assesment  Prior Functional Level: Ambulatory, Drives Self, Independent with Activities of Daily Living, Independent with Medication Management    Finances  Financial Barriers to Discharge: No  Prescription Coverage: Yes    Vision / Hearing Impairment  Vision Impairment : Yes  Right Eye Vision: Impaired, Wears Glasses  Left Eye Vision: Impaired, Wears Glasses  Hearing Impairment : No    Values / Beliefs / Concerns  Values / Beliefs Concerns : No    Advance Directive  Advance Directive?: None  Advance Directive offered?: AD Booklet refused    Domestic Abuse  Have you ever been the victim of abuse or violence?: No  Physical Abuse or Sexual Abuse: No  Verbal Abuse or Emotional Abuse:  No  Possible Abuse/Neglect Reported to:: Not Applicable    Psychological Assessment  History of Substance Abuse: Alcohol  Date Last Used - Alcohol: 12/31/23  Substance Abuse Comments: 3 double shots/day (vodka)  History of Psychiatric Problems: No  Non-compliant with Treatment: No  Newly Diagnosed Illness: No    Discharge Risks or Barriers  Discharge risks or barriers?: Complex medical needs, Substance abuse  Patient risk factors: Cognitive / sensory / physical deficit, Complex medical needs, Substance abuse    Anticipated Discharge Information  Discharge Disposition: Discharged to home/self care (01)

## 2024-01-03 NOTE — ASSESSMENT & PLAN NOTE
CTAP with contrast: Complex mass lesions in the pelvis suggesting ovarian neoplasm (L 7.7 x 5.1 x 5.0 cm, right solid appearing 8 x 8 x 5 cm) and further evaluation is suggested to rule out malignancy    lower quadrant abdominal pain  Family history ovarian cancer  Check tumor markers  GYN oncology consulted, follow-up appreciated

## 2024-01-03 NOTE — CONSULTS
"    CHIEF COMPLAINT:  Gallbladder wall thickening      Consulting Physician- Emerson Calvin MD    HISTORY OF PRESENT ILLNESS: The patient is a 43 year-old  woman who was transferred from an outside hospital with elevated bilirubin and a thickened gallbladder wall.  Patient also has alcoholic cirrhosis.  She said she stopped drinking 2 days prior.  She has known cirrhosis.  Patient complains of pain down in her pelvis.  She does not have any right upper quadrant pain or nausea vomiting.  No fever.  She has an elevated  of 160.  She does not have a leukocytosis.  She has a new ovarian mass on her CT scan.  An ultrasound showed thickened gallbladder wall with gallstones. MELD score is 21 associated with a 20% 3-month mortality    PAST MEDICAL HISTORY:  has a past medical history of ALC (alcoholic liver cirrhosis) (HCC) (7/17/2023), ASTHMA, Gallstone, Hypertension, Low back pain, and PID (acute pelvic inflammatory disease) (05/23/2014).    She has no past medical history of CAD (coronary artery disease), Cancer (HCC), Congestive heart failure (HCC), COPD, Diabetes, Psychiatric disorder, Renal disorder, Seizure disorder (HCC), or Stroke (HCC).      PAST SURGICAL HISTORY:  has a past surgical history that includes IUD Removal (5/25/2014) and pr upper gi endoscopy,diagnosis (N/A, 7/19/2023).    ALLERGIES:   Allergies   Allergen Reactions    Aspirin Shortness of Breath     Shortness of breath, asthma exacerbation, and \"a congested feeling.\"       CURRENT MEDICATIONS:    Home Medications    **Home medications have not yet been reviewed for this encounter**       lactulose 20 GM/30ML Solution Take 30 mL by mouth 3 times a day. Arturo Quintanilla M.D. Needs Review   furosemide (LASIX) 40 MG Tab Take 0.5 Tablets by mouth every day. Arturo Quintanilla M.D. Needs Review   spironolactone (ALDACTONE) 25 MG Tab Take 1 Tablet by mouth every day. Arturo Quintanilla M.D. Needs Review   therapeutic multivitamin-minerals " "(THERAGRAN-M) Tab Take 1 Tablet by mouth every day. Physician Outpatient Needs Review   MAGNESIUM-ZINC PO Take 1 Tablet by mouth every day. Physician Outpatient Needs Review   fluticasone (FLONASE) 50 MCG/ACT nasal spray Administer 2 Sprays into affected nostril(S) every day. Physician Outpatient Needs Review   FLOVENT  MCG/ACT Aerosol Inhale 1 Puff 2 times a day. Physician Outpatient Needs Review   ipratropium (ATROVENT) 0.03 % Solution Administer 2 Sprays into affected nostril(S) 2 times a day. Physician Outpatient Needs Review   albuterol (PROVENTIL) 2.5mg/3ml Nebu Soln solution for nebulization 3 mL by Nebulization route every four hours as needed for Shortness of Breath. Chris Quinn M.D. Needs Review   albuterol (VENTOLIN OR PROVENTIL) 108 (90 BASE) MCG/ACT Aero Soln inhalation aerosol Inhale 2 Puffs by mouth every four hours as needed for Shortness of Breath. Chris Quinn M.D. Needs Review         FAMILY HISTORY: family history is not on file.    SOCIAL HISTORY:  reports that she has been smoking cigarettes. She does not have any smokeless tobacco history on file. She reports current alcohol use. She reports that she does not use drugs.    REVIEW OF SYSTEMS: Comprehensive review of systems is negative with the exception of the aforementioned HPI, PMH, and PSH bullets in accordance with CMS guidelines.    PHYSICAL EXAMINATION:      Constitutional:     Vital Signs: /74   Pulse 85   Temp 36.7 °C (98.1 °F) (Temporal)   Resp 18   Ht 1.499 m (4' 11\")   Wt 68.7 kg (151 lb 7.3 oz)   SpO2 99%    General Appearance: Appears o3lder than stated age with chronic illness.  HEENT: The pupils are equal, round, and reactive to light bilaterally. The extraocular muscles are intact bilaterally. The sclera are icteric. Nares and oropharynx are clear.   Neck: Supple. No adenopathy.  Respiratory:   Inspection: Unlabored respirations, no intercostal retractions, paradoxical motion, or accessory muscle " use.   Auscultation: clear to auscultation.  Cardiovascular:   Inspection: The skin is warm.  Auscultation: Regular rate and rhythm.   Peripheral Pulses: Normal.   Abdomen:  Inspection: Abdominal inspection reveals no abrasions, contusions, lacerations or penetrating wounds.   Palpation: Palpation is remarkable for mild tenderness in the right lower quadrant and left lower quadrant region. No abdominal wall hernias.  Extremities:   Examination of the upper and lower extremities demonstrates no cyanosis edema or clubbing.  Neurologic:   Alert & oriented to person, time and place. Normal motor function. Normal sensory function. No focal deficits noted.    LABORATORY VALUES:   Recent Labs     01/02/24  1531   WBC 6.3   RBC 2.55*   HEMOGLOBIN 8.8*   HEMATOCRIT 26.0*   .0*   MCH 34.5*   MCHC 33.8   RDW 51.7*   PLATELETCT 106*   MPV 10.4     Recent Labs     01/02/24  1531   SODIUM 135   POTASSIUM 3.3*   CHLORIDE 106   CO2 20   GLUCOSE 109*   BUN 9   CREATININE 0.35*   CALCIUM 8.2*     Recent Labs     01/02/24  1531   ASTSGOT 76*   ALTSGPT 31   TBILIRUBIN 8.0*   DBILIRUBIN 1.8*   ALKPHOSPHAT 119*   GLOBULIN 4.1*   INR 2.00*     Recent Labs     01/02/24  1531   INR 2.00*        IMAGING:   No orders to display       ASSESSMENT AND PLAN:     Thickening of wall of gallbladder- (present on admission)  Assessment & Plan  While this patient does have some mild gallbladder thickening her symptoms do not sound like they are acute cholecystitis.  Not have any right upper quadrant pain or postprandial illness.  She does not have a leukocytosis.  Patient's thickened gallbladder wall is likely related to ascites and cirrhosis. I tend to believe that this is related to the patient's primary liver failure and not her gallbladder. Do not recommend a cholecystostomy tube or cholecystectomy at this time.   Patient's new ovarian mass should be evaluated by gynecologist.        DISPOSITION: Medical evaluation and admission. The patient  was admitted to the Medical Service prior to surgical consultation. [facility] will follow.     ____________________________________     Marta Stock M.D.    DD: 1/2/2024  5:55 PM

## 2024-01-03 NOTE — PROGRESS NOTES
Pt had asthma episode, c/o shortness of breath and wheezing. Albuterol given and daily fluticasone given afterwards. O2 at 98%. HOB at above 45 degrees, call light within reach.

## 2024-01-03 NOTE — ASSESSMENT & PLAN NOTE
While this patient does have some mild gallbladder thickening her symptoms do not sound like they are acute cholecystitis.  Not have any right upper quadrant pain or postprandial illness.  She does not have a leukocytosis.  Patient's thickened gallbladder wall is likely related to ascites and cirrhosis. I tend to believe that this is related to the patient's primary liver failure and not her gallbladder. Do not recommend a cholecystostomy tube or cholecystectomy at this time.   Patient's new ovarian mass should be evaluated by gynecologist.

## 2024-01-03 NOTE — CARE PLAN
The patient is Stable - Low risk of patient condition declining or worsening    Shift Goals  Clinical Goals: pain control  Patient Goals: comfort  Family Goals: MICKEY    Progress made toward(s) clinical / shift goals:  Pain medicated per MAR.    Patient is not progressing towards the following goals: Need full med rec per pt or pt's family.       Problem: Knowledge Deficit - Standard  Goal: Patient and family/care givers will demonstrate understanding of plan of care, disease process/condition, diagnostic tests and medications  Outcome: Progressing     Problem: Hemodynamics  Goal: Patient's hemodynamics, fluid balance and neurologic status will be stable or improve  Outcome: Progressing     Problem: Fluid Volume  Goal: Fluid volume balance will be maintained  Outcome: Progressing     Problem: Urinary - Renal Perfusion  Goal: Ability to achieve and maintain adequate renal perfusion and functioning will improve  Outcome: Progressing

## 2024-01-03 NOTE — PROGRESS NOTES
Bedside report received. Assessment complete.  A&Ox4. Denies CP/SOB.  Reporting 5/10 pain. Declined intervention at this time.  Skin per flowsheet.  NPO at midnight  Denies N/V at this time.   + void. Last BM PTA  Pt ambulates independently.  All needs met at this time. Call light within reach. Pt calls appropriately.   Bed low and locked, non skid socks in place. Hourly rounding in place.

## 2024-01-03 NOTE — PROGRESS NOTES
Pt c/o R chest non-radiating pain after getting back into bed from bathroom denies shortness of breath. Lung sounds clear throughout, diminished at lower bilateral lobes, vitals WDL. Contacted hospitalist, given okay to give dilaudid. Will continue to monitor.

## 2024-01-03 NOTE — PROGRESS NOTES
Assumed care of patient at 0645. Bedside report received. Assessment complete.  AA&Ox4. Denies CP/SOB.  Reporting pain. Medicated per MAR. Educated patient regarding pharmacologic and non pharmacologic modalities for pain management.  Skin per flow sheets. All skin intact   Tolerating NPO. Denies N/V.  + void. + BM. Last BM 1/2  Pt ambulates SBA  Fall prevention measures in place per flowsheets.  Educated on SCD use, patient declined at this time, ambulating frequently, Pharmacologic VTE prophylaxis in use.  Plan of care discussed, all questions answered.  Educated regarding importance of oral care. Oral care kit at bedside. Call light is within reach, treaded slipper socks on, bed in lowest/ locked position, hourly rounding in place, all needs met at this time.

## 2024-01-03 NOTE — CONSULTS
"Gynecologic Oncology Consultation    Date: 1/3/2024    Requesting Physician: Dr. Emerson Calvin    Consulting Physician: DONNA Bales MD    Reason for consultation: ovarian masses    CC: abdominal/pelvic pain, nausea and vomiting    HPI:   Ms Saldivar is a very pleasant A1 44 yo female who is currently menstruating. She has a past medical history significant for ETOH abuse, cirrhosis, and asthma. She reports normal menstrual cycles throughout her life with mild cramping pain, and about 7-9 days of regular bleeding. She adds that her menses have become more painful after turning 40yo. She reports an instance in  in which her IUD became displaced, leading to admission for PID. Otherwise she denies history of STD, abnormal pap smear, or other gynecologic diagnosis. She was in her usual state of health until 24, when she developed lower abdominal/pelvic pain, bloating and vomiting, prompting presentation [facility] ED. In the Ed she was found to have concern for cholecystitis and ovarian masses, and thus was transferred to [facility] for a higher level of care.  Our service was consulted on 24 by Dr Calvin.     This patient was seen and evaluated at bedside this morning and endorses the above history, adding that she has no known family history of malignancy.  Today, she reports ongoing abdominal bloating. She reports intermittent lower abdominal/pelvic, currently comfortable. She reports mild dyspnea with exertion, resolves with rest, and related this to her history of asthma. She reports the emesis she experienced prior to admission has since resolved, denying nausea, vomiting and reporting that she is hungry. She reports diarrhea, denies hematochezia or melena.  She denies urinary symptoms, fever and chills.          Past Medical History:   Diagnosis Date    ALC (alcoholic liver cirrhosis) (HCC) 2023    ASTHMA     Gallstone     Hypertension     Low back pain     \"pinched nerve\" "    PID (acute pelvic inflammatory disease) 2014       Past Surgical History:   Procedure Laterality Date    FL UPPER GI ENDOSCOPY,DIAGNOSIS N/A 2023    Procedure: GASTROSCOPY - ESOPHAGOGASTRODUODENOSCOPY;  Surgeon: Roxanne Melendez M.D.;  Location: SURGERY SAME DAY [facility];  Service: Gastroenterology    IUD REMOVAL  2014            OB/GYN History: A1( miscarriage), NSVDx5  LMP: currently menstruating, cycle length#28days, she reports bleeding for 7-9 days and adds that her menstrual cramps worsened after age 39.   STD: no history **History of PID due to IUD in place for 13 years in , per patient and chart review**  Last pap: 2 yr ago per patient, no history of abnormal per patient  No history of endometriosis, fibroids, or gynecologic surgeries    Current Facility-Administered Medications   Medication Dose Route Frequency Provider Last Rate Last Admin    senna-docusate (Pericolace Or Senokot S) 8.6-50 MG per tablet 2 Tablet  2 Tablet Oral BID Emerson Calvin M.D.   2 Tablet at 24 0429    And    polyethylene glycol/lytes (Miralax) Packet 1 Packet  1 Packet Oral QDAY PRN Emerson Calvin M.D.        And    bisacodyl (Dulcolax) suppository 10 mg  10 mg Rectal QDAY PRN Emerson Calvin M.D.        LR (Bolus) infusion 500 mL  500 mL Intravenous Once PRN Emerson Calvin M.D.        acetaminophen (Tylenol) tablet 650 mg  650 mg Oral Q6HRS PRN Emerson Calvin M.D.        labetalol (Normodyne/Trandate) injection 10 mg  10 mg Intravenous Q4HRS PRN Emerson Calvin M.D.        ondansetron (Zofran) syringe/vial injection 4 mg  4 mg Intravenous Q4HRS PRN Emerson Calvin M.D.        ondansetron (Zofran ODT) dispertab 4 mg  4 mg Oral Q4HRS PRN Emerson Calvin M.D.        promethazine (Phenergan) tablet 12.5-25 mg  12.5-25 mg Oral Q4HRS PRN Emerson Calvin M.D.        promethazine (Phenergan) suppository 12.5-25 mg  12.5-25 mg Rectal Q4HRS PRN Emerson Calvin M.D.        prochlorperazine (Compazine) injection 5-10 mg   5-10 mg Intravenous Q4HRS PRN Emerson Calvin M.D.        Pharmacy Consult Request ...Pain Management Review 1 Each  1 Each Other PHARMACY TO DOSE Emerson Calvin M.D.        oxyCODONE immediate-release (Roxicodone) tablet 5 mg  5 mg Oral Q3HRS PRN Emerson Calvin M.D.        Or    oxyCODONE immediate release (Roxicodone) tablet 10 mg  10 mg Oral Q3HRS PRN Emerson Calvin M.D.   10 mg at 01/03/24 0121    Or    HYDROmorphone (Dilaudid) injection 0.5 mg  0.5 mg Intravenous Q3HRS PRN Emerson Calvin M.D.   0.5 mg at 01/03/24 0555    pantoprazole (Protonix) injection 40 mg  40 mg Intravenous BID Emerson Calvin M.D.   40 mg at 01/03/24 0429    LORazepam (Ativan) tablet 0.5 mg  0.5 mg Oral Q4HRS PRN Emerson Calvin M.D.        LORazepam (Ativan) tablet 1 mg  1 mg Oral Q4HRS PRN Emerson Calvin M.D.        LORazepam (Ativan) tablet 2 mg  2 mg Oral Q2HRS PRN Emerson Calvin M.D.        LORazepam (Ativan) tablet 3 mg  3 mg Oral Q HOUR PRN Emerson Calvin M.D.        LORazepam (Ativan) tablet 4 mg  4 mg Oral Q15 MIN PRN Emerson Calvin M.D.        Or    diazePAM (Valium) injection 10 mg  10 mg Intravenous Q15 MIN PRN Emerson Calvin M.D.        thiamine (Vitamin B-1) tablet 100 mg  100 mg Oral DAILY Emerson Calvin M.D.   100 mg at 01/03/24 0429    And    multivitamin tablet 1 Tablet  1 Tablet Oral DAILY Emerson Calvin M.D.   1 Tablet at 01/03/24 0429    And    folic acid (Folvite) tablet 1 mg  1 mg Oral DAILY Emerson Calvin M.D.   1 mg at 01/03/24 0429    albuterol (Proventil) 2.5mg/0.5ml nebulizer solution 2.5 mg  2.5 mg Nebulization Q4HRS PRN Emerson Calvin M.D.        albuterol inhaler 2 Puff  2 Puff Inhalation Q4HRS PRN Emerson Calvin M.D.   2 Puff at 01/03/24 0104    fluticasone (Flovent HFA) 110 MCG/ACT inhaler 110 mcg  1 Puff Inhalation BID Emerson Calvin M.D.   110 mcg at 01/03/24 0430    furosemide (Lasix) tablet 20 mg  20 mg Oral DAILY Emerson Calvin M.D.   20 mg at 01/03/24 0512    spironolactone (Aldactone) tablet 25 mg  25 mg Oral DAILY Emerson  "RENE Calvin   25 mg at 01/03/24 0512    riFAXIMin (Xifaxan) tablet 550 mg  550 mg Oral BID Emerson Calvin M.D.   550 mg at 01/03/24 0429    lactated ringers infusion  1,000 mL Intravenous Continuous Emerson Calvin M.D.   Stopped at 01/03/24 0804       Allergies:  Aspirin    Social History     Socioeconomic History    Marital status:      Spouse name: Not on file    Number of children: Not on file    Years of education: Not on file    Highest education level: Not on file   Occupational History    Not on file   Tobacco Use    Smoking status: Every Day     Current packs/day: 0.00     Types: Cigarettes     Last attempt to quit: 11/1/2013     Years since quitting: 10.1    Smokeless tobacco: Not on file    Tobacco comments:     quit 5 mos ago   Substance and Sexual Activity    Alcohol use: Yes     Comment: 2x week    Drug use: No    Sexual activity: Not on file   Other Topics Concern    Not on file   Social History Narrative    Not on file     Social Determinants of Health     Financial Resource Strain: Not on file   Food Insecurity: Not on file   Transportation Needs: Not on file   Physical Activity: Not on file   Stress: Not on file   Social Connections: Not on file   Intimate Partner Violence: Not on file   Housing Stability: Not on file       No family history on file.      Physical Exam:  /68   Pulse 74   Temp 36.5 °C (97.7 °F) (Temporal)   Resp 18   Ht 1.499 m (4' 11\")   Wt 68.7 kg (151 lb 7.3 oz)   SpO2 95%       Physical Exam  Vitals and nursing note reviewed.   Constitutional:       General: She is not in acute distress.     Appearance: She is obese. She is not diaphoretic.   HENT:      Head: Normocephalic and atraumatic.      Right Ear: External ear normal.      Left Ear: External ear normal.      Nose: Nose normal.      Mouth/Throat:      Mouth: Mucous membranes are dry.      Pharynx: Oropharynx is clear.   Eyes:      Pupils: Pupils are equal, round, and reactive to light.   Cardiovascular:    "   Rate and Rhythm: Normal rate.      Pulses: Normal pulses.   Pulmonary:      Effort: Pulmonary effort is normal.   Abdominal:      General: Bowel sounds are normal. There is distension.      Palpations: Abdomen is soft. There is no mass.      Tenderness: There is abdominal tenderness (RLQ, hypogastrium, LLQ, worst in RLQ). There is no right CVA tenderness, left CVA tenderness, guarding or rebound.      Hernia: No hernia is present.      Comments: Ovarian masses not appreciated with palpation   Genitourinary:     Comments: Deferred secondary to active menses   Musculoskeletal:         General: Normal range of motion.      Right lower leg: No edema.      Left lower leg: No edema.      Comments: DP pulses 2+   Skin:     General: Skin is warm and dry.      Capillary Refill: Capillary refill takes 2 to 3 seconds.      Coloration: Skin is jaundiced.   Neurological:      Mental Status: She is alert and oriented to person, place, and time.   Psychiatric:         Mood and Affect: Mood normal.         Behavior: Behavior normal.         Judgment: Judgment normal.          Labs:  Recent Labs     01/02/24  1531   WBC 6.3   RBC 2.55*   HEMOGLOBIN 8.8*   HEMATOCRIT 26.0*   .0*   MCH 34.5*   MCHC 33.8   RDW 51.7*   PLATELETCT 106*   MPV 10.4     Recent Labs     01/02/24  1531   SODIUM 135   POTASSIUM 3.3*   CHLORIDE 106   CO2 20   GLUCOSE 109*   BUN 9   CREATININE 0.35*   CALCIUM 8.2*     Recent Labs     01/02/24  1531   INR 2.00*     Recent Labs     01/02/24  1531   ASTSGOT 76*   ALTSGPT 31   TBILIRUBIN 8.0*   DBILIRUBIN 1.8*   ALKPHOSPHAT 119*   GLOBULIN 4.1*   INR 2.00*       Radiology:  From Transfer facility:     CTAP with contrast: Complex mass lesions in the pelvis suggesting ovarian neoplasm (L 7.7 x 5.1 x 5.0 cm, right solid appearing 8 x 8 x 5 cm) and further evaluation is suggested to rule out malignancy.  There is a small right pleural effusion which may be improved when compared with previous study.  Anxiety is  less really apparent on the current study.  The spleen remains slightly enlarged and the appearance of the liver suggest cirrhosis with possible portal hypertension.  Cholelithiasis     Assessment and Plan:  Ms Saldivar is a very pleasant 44yo  female who is actively menstruating, currently admitted for abdominal pain, vomiting, and found to have concern for cholecystitis, and ovarian masses.  Our service was consulted regarding the ovarian masses.     Ovarian Masses: We discussed potential etiologies for the mass including benign ovarian neoplasm such as serous or mucinous cystadenoma, endometrioma, or hemorrhagic cyst, tubo-ovarian abscess, paratubal cyst, hydrosalpinx, borderline tumor, or ovarian malignancy. Elevated . Imaging reviewed independently by Dr Dixon, who has low suspicion for malignancy at this time. Also masses relatively stable in appearance from prior scan in 2023, with the RT adnexal masses appearing more solid at this time. Due to remote history of PID, TOA higher on the differential at this time. Ordered TVUS for further evaluation.    Dispo: as the patient is clinically stable, with lower suspicion for ovarian malignancy at this time, as well as, having active [employer] insurance, she should be discharge with close outpatient f/u with GYN or GYN ONC in [place] for definitive management of the ovarian masses.     Thank you for for allowing us to participate in this patient's care. We will sign off this case at this time, but will remain available. Please feel free to contact us for any questions or if we can be of any further assistance.     ANTON Bales MD  Gynecologic Oncology  [facility]

## 2024-01-04 NOTE — HOSPITAL COURSE
Windy Saldivar is a 43 y.o. female with history of alcoholic cirrhosis who presented 1/2/2024 as a direct transfer from [facility] ER for evaluation of cholecystitis and neoplastic appearance of ovary.  Patient reported being clean and not drinking for 4 months, however reported intermittent binge drinking for the past month.  Her last drink was on December 31.  Patient reported having nausea, vomiting, abdominal discomfort earlier today, therefore went to ER for evaluation.

## 2024-01-05 LAB — HAPTOGLOB SERPL-MCNC: <10 MG/DL (ref 30–200)

## 2025-05-11 ENCOUNTER — APPOINTMENT (OUTPATIENT)
Dept: RADIOLOGY | Facility: MEDICAL CENTER | Age: 45
DRG: 871 | End: 2025-05-11
Attending: EMERGENCY MEDICINE
Payer: COMMERCIAL

## 2025-05-11 ENCOUNTER — HOSPITAL ENCOUNTER (INPATIENT)
Facility: MEDICAL CENTER | Age: 45
LOS: 6 days | DRG: 871 | End: 2025-05-17
Attending: EMERGENCY MEDICINE
Payer: COMMERCIAL

## 2025-05-11 DIAGNOSIS — E87.6 HYPOKALEMIA: ICD-10-CM

## 2025-05-11 DIAGNOSIS — K70.31 ALCOHOLIC CIRRHOSIS OF LIVER WITH ASCITES (HCC): ICD-10-CM

## 2025-05-11 DIAGNOSIS — Z87.19 HISTORY OF ESOPHAGEAL VARICES: Primary | ICD-10-CM

## 2025-05-11 DIAGNOSIS — J18.9 PNEUMONIA OF BOTH LUNGS DUE TO INFECTIOUS ORGANISM, UNSPECIFIED PART OF LUNG: ICD-10-CM

## 2025-05-11 PROBLEM — Z91.148 HISTORY OF MEDICATION NONCOMPLIANCE: Status: ACTIVE | Noted: 2025-05-11

## 2025-05-11 PROBLEM — J96.01 ACUTE HYPOXIC RESPIRATORY FAILURE (HCC): Status: ACTIVE | Noted: 2025-05-11

## 2025-05-11 PROBLEM — E87.29 HIGH ANION GAP METABOLIC ACIDOSIS: Status: ACTIVE | Noted: 2025-05-11

## 2025-05-11 PROBLEM — R31.0 GROSS HEMATURIA: Status: ACTIVE | Noted: 2025-05-11

## 2025-05-11 PROBLEM — A41.9 SEPSIS (HCC): Status: ACTIVE | Noted: 2025-05-11

## 2025-05-11 LAB
ALBUMIN FLD-MCNC: 0.3 G/DL
ALBUMIN SERPL BCP-MCNC: 3 G/DL (ref 3.2–4.9)
ALBUMIN/GLOB SERPL: 0.8 G/DL
ALP SERPL-CCNC: 154 U/L (ref 30–99)
ALT SERPL-CCNC: 30 U/L (ref 2–50)
ANION GAP SERPL CALC-SCNC: 18 MMOL/L (ref 7–16)
APPEARANCE FLD: NORMAL
APPEARANCE UR: CLEAR
AST SERPL-CCNC: 71 U/L (ref 12–45)
B PARAP IS1001 DNA NPH QL NAA+NON-PROBE: NOT DETECTED
B PERT.PT PRMT NPH QL NAA+NON-PROBE: NOT DETECTED
BACTERIA #/AREA URNS HPF: ABNORMAL /HPF
BASOPHILS # BLD AUTO: 0.4 % (ref 0–1.8)
BASOPHILS # BLD: 0.04 K/UL (ref 0–0.12)
BILIRUB SERPL-MCNC: 11.9 MG/DL (ref 0.1–1.5)
BILIRUB UR QL STRIP.AUTO: NEGATIVE
BODY FLD TYPE: NORMAL
BODY FLD TYPE: NORMAL
BUN SERPL-MCNC: 3 MG/DL (ref 8–22)
C PNEUM DNA NPH QL NAA+NON-PROBE: NOT DETECTED
CALCIUM ALBUM COR SERPL-MCNC: 9.1 MG/DL (ref 8.5–10.5)
CALCIUM SERPL-MCNC: 8.3 MG/DL (ref 8.5–10.5)
CASTS URNS QL MICRO: ABNORMAL /LPF (ref 0–2)
CELLS FLD: 47
CHLORIDE SERPL-SCNC: 101 MMOL/L (ref 96–112)
CK SERPL-CCNC: 122 U/L (ref 0–154)
CO2 SERPL-SCNC: 17 MMOL/L (ref 20–33)
COLOR FLD: YELLOW
COLOR UR: YELLOW
CREAT SERPL-MCNC: 0.52 MG/DL (ref 0.5–1.4)
EKG IMPRESSION: NORMAL
EOSINOPHIL # BLD AUTO: 0.39 K/UL (ref 0–0.51)
EOSINOPHIL NFR BLD: 3.4 % (ref 0–6.9)
EPITHELIAL CELLS 1715: ABNORMAL /HPF (ref 0–5)
ERYTHROCYTE [DISTWIDTH] IN BLOOD BY AUTOMATED COUNT: 64.7 FL (ref 35.9–50)
FLUAV RNA NPH QL NAA+NON-PROBE: NOT DETECTED
FLUAV RNA SPEC QL NAA+PROBE: NEGATIVE
FLUBV RNA NPH QL NAA+NON-PROBE: NOT DETECTED
FLUBV RNA SPEC QL NAA+PROBE: NEGATIVE
GFR SERPLBLD CREATININE-BSD FMLA CKD-EPI: 116 ML/MIN/1.73 M 2
GLOBULIN SER CALC-MCNC: 3.6 G/DL (ref 1.9–3.5)
GLUCOSE FLD-MCNC: 100 MG/DL
GLUCOSE SERPL-MCNC: 87 MG/DL (ref 65–99)
GLUCOSE UR STRIP.AUTO-MCNC: NEGATIVE MG/DL
GRAM STN SPEC: NORMAL
HADV DNA NPH QL NAA+NON-PROBE: NOT DETECTED
HCG SERPL QL: NEGATIVE
HCOV 229E RNA NPH QL NAA+NON-PROBE: NOT DETECTED
HCOV HKU1 RNA NPH QL NAA+NON-PROBE: NOT DETECTED
HCOV NL63 RNA NPH QL NAA+NON-PROBE: NOT DETECTED
HCOV OC43 RNA NPH QL NAA+NON-PROBE: NOT DETECTED
HCT VFR BLD AUTO: 31.7 % (ref 37–47)
HGB BLD-MCNC: 10.1 G/DL (ref 12–16)
HMPV RNA NPH QL NAA+NON-PROBE: NOT DETECTED
HPIV1 RNA NPH QL NAA+NON-PROBE: NOT DETECTED
HPIV2 RNA NPH QL NAA+NON-PROBE: NOT DETECTED
HPIV3 RNA NPH QL NAA+NON-PROBE: NOT DETECTED
HPIV4 RNA NPH QL NAA+NON-PROBE: NOT DETECTED
IMM GRANULOCYTES # BLD AUTO: 0.09 K/UL (ref 0–0.11)
IMM GRANULOCYTES NFR BLD AUTO: 0.8 % (ref 0–0.9)
INR PPP: 2.9 (ref 0.87–1.13)
KETONES UR STRIP.AUTO-MCNC: NEGATIVE MG/DL
LACTATE SERPL-SCNC: 1.7 MMOL/L (ref 0.5–2)
LACTATE SERPL-SCNC: 2.7 MMOL/L (ref 0.5–2)
LACTATE SERPL-SCNC: 5.2 MMOL/L (ref 0.5–2)
LACTATE SERPL-SCNC: 5.9 MMOL/L (ref 0.5–2)
LACTATE SERPL-SCNC: 6 MMOL/L (ref 0.5–2)
LACTATE SERPL-SCNC: 6.1 MMOL/L (ref 0.5–2)
LEUKOCYTE ESTERASE UR QL STRIP.AUTO: NEGATIVE
LIPASE SERPL-CCNC: 50 U/L (ref 11–82)
LYMPHOCYTES # BLD AUTO: 2.06 K/UL (ref 1–4.8)
LYMPHOCYTES NFR BLD: 18.1 % (ref 22–41)
LYMPHOCYTES NFR FLD: 9 %
M PNEUMO DNA NPH QL NAA+NON-PROBE: NOT DETECTED
MAGNESIUM SERPL-MCNC: 1.6 MG/DL (ref 1.5–2.5)
MCH RBC QN AUTO: 31.6 PG (ref 27–33)
MCHC RBC AUTO-ENTMCNC: 31.9 G/DL (ref 32.2–35.5)
MCV RBC AUTO: 99.1 FL (ref 81.4–97.8)
MICRO URNS: ABNORMAL
MONOCYTES # BLD AUTO: 0.8 K/UL (ref 0–0.85)
MONOCYTES NFR BLD AUTO: 7 % (ref 0–13.4)
MONOS+MACROS NFR FLD MANUAL: 17 %
NEUTROPHILS # BLD AUTO: 7.98 K/UL (ref 1.82–7.42)
NEUTROPHILS NFR BLD: 70.3 % (ref 44–72)
NEUTROPHILS NFR FLD: 27 %
NITRITE UR QL STRIP.AUTO: NEGATIVE
NRBC # BLD AUTO: 0 K/UL
NRBC BLD-RTO: 0 /100 WBC (ref 0–0.2)
NT-PROBNP SERPL IA-MCNC: 51 PG/ML (ref 0–125)
NUC CELL # FLD: 143 CELLS/UL
PH UR STRIP.AUTO: 7.5 [PH] (ref 5–8)
PHOSPHATE SERPL-MCNC: 3 MG/DL (ref 2.5–4.5)
PLATELET # BLD AUTO: 110 K/UL (ref 164–446)
PLATELETS.RETICULATED NFR BLD AUTO: 2.7 % (ref 0.6–13.1)
PMV BLD AUTO: 9 FL (ref 9–12.9)
POTASSIUM SERPL-SCNC: 3.1 MMOL/L (ref 3.6–5.5)
PROCALCITONIN SERPL-MCNC: 0.13 NG/ML
PROT SERPL-MCNC: 6.6 G/DL (ref 6–8.2)
PROT UR QL STRIP: NEGATIVE MG/DL
PROTHROMBIN TIME: 30.5 SEC (ref 12–14.6)
RBC # BLD AUTO: 3.2 M/UL (ref 4.2–5.4)
RBC # FLD: <2000 CELLS/UL
RBC # URNS HPF: ABNORMAL /HPF (ref 0–2)
RBC UR QL AUTO: ABNORMAL
RSV RNA NPH QL NAA+NON-PROBE: NOT DETECTED
RSV RNA SPEC QL NAA+PROBE: NEGATIVE
RV+EV RNA NPH QL NAA+NON-PROBE: NOT DETECTED
SARS-COV-2 RNA NPH QL NAA+NON-PROBE: NOTDETECTED
SARS-COV-2 RNA RESP QL NAA+PROBE: NOTDETECTED
SIGNIFICANT IND 70042: NORMAL
SITE SITE: NORMAL
SODIUM SERPL-SCNC: 136 MMOL/L (ref 135–145)
SOURCE SOURCE: NORMAL
SP GR UR STRIP.AUTO: 1
UROBILINOGEN UR STRIP.AUTO-MCNC: 2 EU/DL
WBC # BLD AUTO: 11.4 K/UL (ref 4.8–10.8)
WBC #/AREA URNS HPF: ABNORMAL /HPF

## 2025-05-11 PROCEDURE — 94640 AIRWAY INHALATION TREATMENT: CPT

## 2025-05-11 PROCEDURE — 83735 ASSAY OF MAGNESIUM: CPT

## 2025-05-11 PROCEDURE — 99223 1ST HOSP IP/OBS HIGH 75: CPT

## 2025-05-11 PROCEDURE — 87150 DNA/RNA AMPLIFIED PROBE: CPT

## 2025-05-11 PROCEDURE — A9270 NON-COVERED ITEM OR SERVICE: HCPCS

## 2025-05-11 PROCEDURE — 84703 CHORIONIC GONADOTROPIN ASSAY: CPT

## 2025-05-11 PROCEDURE — 84145 PROCALCITONIN (PCT): CPT

## 2025-05-11 PROCEDURE — 82550 ASSAY OF CK (CPK): CPT

## 2025-05-11 PROCEDURE — 84100 ASSAY OF PHOSPHORUS: CPT

## 2025-05-11 PROCEDURE — 96366 THER/PROPH/DIAG IV INF ADDON: CPT

## 2025-05-11 PROCEDURE — 85610 PROTHROMBIN TIME: CPT

## 2025-05-11 PROCEDURE — 0202U NFCT DS 22 TRGT SARS-COV-2: CPT

## 2025-05-11 PROCEDURE — 96367 TX/PROPH/DG ADDL SEQ IV INF: CPT

## 2025-05-11 PROCEDURE — 99285 EMERGENCY DEPT VISIT HI MDM: CPT

## 2025-05-11 PROCEDURE — 96368 THER/DIAG CONCURRENT INF: CPT

## 2025-05-11 PROCEDURE — 83880 ASSAY OF NATRIURETIC PEPTIDE: CPT

## 2025-05-11 PROCEDURE — 700101 HCHG RX REV CODE 250

## 2025-05-11 PROCEDURE — 93005 ELECTROCARDIOGRAM TRACING: CPT | Mod: TC | Performed by: EMERGENCY MEDICINE

## 2025-05-11 PROCEDURE — 82945 GLUCOSE OTHER FLUID: CPT

## 2025-05-11 PROCEDURE — 87070 CULTURE OTHR SPECIMN AEROBIC: CPT

## 2025-05-11 PROCEDURE — 89051 BODY FLUID CELL COUNT: CPT

## 2025-05-11 PROCEDURE — 700111 HCHG RX REV CODE 636 W/ 250 OVERRIDE (IP): Mod: JZ | Performed by: EMERGENCY MEDICINE

## 2025-05-11 PROCEDURE — 700101 HCHG RX REV CODE 250: Performed by: EMERGENCY MEDICINE

## 2025-05-11 PROCEDURE — 83690 ASSAY OF LIPASE: CPT

## 2025-05-11 PROCEDURE — 700105 HCHG RX REV CODE 258

## 2025-05-11 PROCEDURE — 81001 URINALYSIS AUTO W/SCOPE: CPT

## 2025-05-11 PROCEDURE — 87040 BLOOD CULTURE FOR BACTERIA: CPT | Mod: 91

## 2025-05-11 PROCEDURE — 96365 THER/PROPH/DIAG IV INF INIT: CPT

## 2025-05-11 PROCEDURE — 87205 SMEAR GRAM STAIN: CPT

## 2025-05-11 PROCEDURE — A9270 NON-COVERED ITEM OR SERVICE: HCPCS | Performed by: EMERGENCY MEDICINE

## 2025-05-11 PROCEDURE — 85025 COMPLETE CBC W/AUTO DIFF WBC: CPT

## 2025-05-11 PROCEDURE — 700105 HCHG RX REV CODE 258: Performed by: EMERGENCY MEDICINE

## 2025-05-11 PROCEDURE — 85055 RETICULATED PLATELET ASSAY: CPT

## 2025-05-11 PROCEDURE — 700102 HCHG RX REV CODE 250 W/ 637 OVERRIDE(OP)

## 2025-05-11 PROCEDURE — 49082 ABD PARACENTESIS: CPT

## 2025-05-11 PROCEDURE — P9047 ALBUMIN (HUMAN), 25%, 50ML: HCPCS | Mod: JZ | Performed by: EMERGENCY MEDICINE

## 2025-05-11 PROCEDURE — 36415 COLL VENOUS BLD VENIPUNCTURE: CPT

## 2025-05-11 PROCEDURE — 700111 HCHG RX REV CODE 636 W/ 250 OVERRIDE (IP): Mod: JZ

## 2025-05-11 PROCEDURE — 770020 HCHG ROOM/CARE - TELE (206)

## 2025-05-11 PROCEDURE — 83605 ASSAY OF LACTIC ACID: CPT | Mod: 91

## 2025-05-11 PROCEDURE — 96375 TX/PRO/DX INJ NEW DRUG ADDON: CPT

## 2025-05-11 PROCEDURE — 0241U HCHG SARS-COV-2 COVID-19 NFCT DS RESP RNA 4 TRGT ED POC: CPT

## 2025-05-11 PROCEDURE — 700102 HCHG RX REV CODE 250 W/ 637 OVERRIDE(OP): Performed by: EMERGENCY MEDICINE

## 2025-05-11 PROCEDURE — 80053 COMPREHEN METABOLIC PANEL: CPT

## 2025-05-11 PROCEDURE — 82042 OTHER SOURCE ALBUMIN QUAN EA: CPT

## 2025-05-11 PROCEDURE — 87077 CULTURE AEROBIC IDENTIFY: CPT

## 2025-05-11 PROCEDURE — 71045 X-RAY EXAM CHEST 1 VIEW: CPT

## 2025-05-11 RX ORDER — ALBUMIN (HUMAN) 12.5 G/50ML
12.5 SOLUTION INTRAVENOUS ONCE
Status: COMPLETED | OUTPATIENT
Start: 2025-05-11 | End: 2025-05-11

## 2025-05-11 RX ORDER — IPRATROPIUM BROMIDE AND ALBUTEROL SULFATE 2.5; .5 MG/3ML; MG/3ML
3 SOLUTION RESPIRATORY (INHALATION)
Status: DISCONTINUED | OUTPATIENT
Start: 2025-05-11 | End: 2025-05-17 | Stop reason: HOSPADM

## 2025-05-11 RX ORDER — SODIUM CHLORIDE 9 MG/ML
1000 INJECTION, SOLUTION INTRAVENOUS ONCE
Status: COMPLETED | OUTPATIENT
Start: 2025-05-11 | End: 2025-05-11

## 2025-05-11 RX ORDER — MAGNESIUM SULFATE HEPTAHYDRATE 40 MG/ML
2 INJECTION, SOLUTION INTRAVENOUS ONCE
Status: COMPLETED | OUTPATIENT
Start: 2025-05-11 | End: 2025-05-11

## 2025-05-11 RX ORDER — LIDOCAINE HYDROCHLORIDE AND EPINEPHRINE 10; 10 MG/ML; UG/ML
5 INJECTION, SOLUTION INFILTRATION; PERINEURAL ONCE
Status: COMPLETED | OUTPATIENT
Start: 2025-05-11 | End: 2025-05-11

## 2025-05-11 RX ORDER — AZITHROMYCIN 250 MG/1
500 TABLET, FILM COATED ORAL DAILY
Status: COMPLETED | OUTPATIENT
Start: 2025-05-12 | End: 2025-05-13

## 2025-05-11 RX ORDER — IPRATROPIUM BROMIDE AND ALBUTEROL SULFATE 2.5; .5 MG/3ML; MG/3ML
SOLUTION RESPIRATORY (INHALATION)
Status: COMPLETED
Start: 2025-05-11 | End: 2025-05-11

## 2025-05-11 RX ORDER — FUROSEMIDE 10 MG/ML
40 INJECTION INTRAMUSCULAR; INTRAVENOUS ONCE
Status: COMPLETED | OUTPATIENT
Start: 2025-05-11 | End: 2025-05-11

## 2025-05-11 RX ORDER — CEFTRIAXONE 1 G/1
1000 INJECTION, POWDER, FOR SOLUTION INTRAMUSCULAR; INTRAVENOUS ONCE
Status: COMPLETED | OUTPATIENT
Start: 2025-05-11 | End: 2025-05-11

## 2025-05-11 RX ORDER — OXYCODONE HYDROCHLORIDE 5 MG/1
5 TABLET ORAL EVERY 4 HOURS PRN
Refills: 0 | Status: DISCONTINUED | OUTPATIENT
Start: 2025-05-11 | End: 2025-05-17 | Stop reason: HOSPADM

## 2025-05-11 RX ORDER — BUDESONIDE AND FORMOTEROL FUMARATE DIHYDRATE 80; 4.5 UG/1; UG/1
2 AEROSOL RESPIRATORY (INHALATION) DAILY
COMMUNITY

## 2025-05-11 RX ORDER — SODIUM CHLORIDE, SODIUM LACTATE, POTASSIUM CHLORIDE, AND CALCIUM CHLORIDE .6; .31; .03; .02 G/100ML; G/100ML; G/100ML; G/100ML
1000 INJECTION, SOLUTION INTRAVENOUS ONCE
Status: COMPLETED | OUTPATIENT
Start: 2025-05-11 | End: 2025-05-11

## 2025-05-11 RX ORDER — AMOXICILLIN 250 MG
2 CAPSULE ORAL EVERY EVENING
Status: DISCONTINUED | OUTPATIENT
Start: 2025-05-11 | End: 2025-05-17 | Stop reason: HOSPADM

## 2025-05-11 RX ORDER — POTASSIUM CHLORIDE 1500 MG/1
40 TABLET, EXTENDED RELEASE ORAL ONCE
Status: COMPLETED | OUTPATIENT
Start: 2025-05-11 | End: 2025-05-11

## 2025-05-11 RX ORDER — POLYETHYLENE GLYCOL 3350 17 G/17G
1 POWDER, FOR SOLUTION ORAL
Status: DISCONTINUED | OUTPATIENT
Start: 2025-05-11 | End: 2025-05-17 | Stop reason: HOSPADM

## 2025-05-11 RX ORDER — ACETAMINOPHEN 325 MG/1
650 TABLET ORAL EVERY 6 HOURS PRN
Status: DISCONTINUED | OUTPATIENT
Start: 2025-05-11 | End: 2025-05-17 | Stop reason: HOSPADM

## 2025-05-11 RX ORDER — ENOXAPARIN SODIUM 100 MG/ML
40 INJECTION SUBCUTANEOUS DAILY
Status: DISCONTINUED | OUTPATIENT
Start: 2025-05-11 | End: 2025-05-17 | Stop reason: HOSPADM

## 2025-05-11 RX ORDER — LACTULOSE 10 G/15ML
30 SOLUTION ORAL 3 TIMES DAILY
Status: DISCONTINUED | OUTPATIENT
Start: 2025-05-11 | End: 2025-05-17 | Stop reason: HOSPADM

## 2025-05-11 RX ORDER — POTASSIUM CHLORIDE 7.45 MG/ML
10 INJECTION INTRAVENOUS
Status: COMPLETED | OUTPATIENT
Start: 2025-05-11 | End: 2025-05-11

## 2025-05-11 RX ORDER — POTASSIUM CHLORIDE 7.45 MG/ML
10 INJECTION INTRAVENOUS ONCE
Status: COMPLETED | OUTPATIENT
Start: 2025-05-11 | End: 2025-05-11

## 2025-05-11 RX ORDER — AZITHROMYCIN 250 MG/1
500 TABLET, FILM COATED ORAL ONCE
Status: COMPLETED | OUTPATIENT
Start: 2025-05-11 | End: 2025-05-11

## 2025-05-11 RX ADMIN — POTASSIUM CHLORIDE 40 MEQ: 1500 TABLET, EXTENDED RELEASE ORAL at 02:26

## 2025-05-11 RX ADMIN — AMPICILLIN SODIUM, SULBACTAM SODIUM 3 G: 2; 1 INJECTION, POWDER, FOR SOLUTION INTRAMUSCULAR; INTRAVENOUS at 05:27

## 2025-05-11 RX ADMIN — POTASSIUM CHLORIDE 40 MEQ: 1500 TABLET, EXTENDED RELEASE ORAL at 05:31

## 2025-05-11 RX ADMIN — ALBUMIN (HUMAN) 12.5 G: 0.25 INJECTION, SOLUTION INTRAVENOUS at 05:18

## 2025-05-11 RX ADMIN — LACTULOSE 30 ML: 10 SOLUTION ORAL at 17:36

## 2025-05-11 RX ADMIN — LACTULOSE 30 ML: 10 SOLUTION ORAL at 05:31

## 2025-05-11 RX ADMIN — LIDOCAINE HYDROCHLORIDE AND EPINEPHRINE 5 ML: 10; 10 INJECTION, SOLUTION INFILTRATION; PERINEURAL at 01:45

## 2025-05-11 RX ADMIN — LACTULOSE 30 ML: 10 SOLUTION ORAL at 13:45

## 2025-05-11 RX ADMIN — FUROSEMIDE 40 MG: 10 INJECTION, SOLUTION INTRAVENOUS at 02:28

## 2025-05-11 RX ADMIN — IPRATROPIUM BROMIDE AND ALBUTEROL SULFATE 3 ML: .5; 2.5 SOLUTION RESPIRATORY (INHALATION) at 07:59

## 2025-05-11 RX ADMIN — AZITHROMYCIN DIHYDRATE 500 MG: 250 TABLET ORAL at 02:26

## 2025-05-11 RX ADMIN — AMPICILLIN SODIUM, SULBACTAM SODIUM 3 G: 2; 1 INJECTION, POWDER, FOR SOLUTION INTRAMUSCULAR; INTRAVENOUS at 17:40

## 2025-05-11 RX ADMIN — AMPICILLIN SODIUM, SULBACTAM SODIUM 3 G: 2; 1 INJECTION, POWDER, FOR SOLUTION INTRAMUSCULAR; INTRAVENOUS at 12:07

## 2025-05-11 RX ADMIN — CEFTRIAXONE SODIUM 1000 MG: 1 INJECTION, POWDER, FOR SOLUTION INTRAMUSCULAR; INTRAVENOUS at 02:31

## 2025-05-11 RX ADMIN — POTASSIUM CHLORIDE 10 MEQ: 7.46 INJECTION, SOLUTION INTRAVENOUS at 05:50

## 2025-05-11 RX ADMIN — SODIUM CHLORIDE 1000 ML: 9 INJECTION, SOLUTION INTRAVENOUS at 04:00

## 2025-05-11 RX ADMIN — POTASSIUM CHLORIDE 10 MEQ: 7.46 INJECTION, SOLUTION INTRAVENOUS at 07:31

## 2025-05-11 RX ADMIN — OXYCODONE 5 MG: 5 TABLET ORAL at 10:05

## 2025-05-11 RX ADMIN — POTASSIUM CHLORIDE 10 MEQ: 7.46 INJECTION, SOLUTION INTRAVENOUS at 08:39

## 2025-05-11 RX ADMIN — MOMETASONE FUROATE AND FORMOTEROL FUMARATE DIHYDRATE 2 PUFF: 100; 5 AEROSOL RESPIRATORY (INHALATION) at 20:16

## 2025-05-11 RX ADMIN — OXYCODONE 5 MG: 5 TABLET ORAL at 13:44

## 2025-05-11 RX ADMIN — POTASSIUM CHLORIDE 10 MEQ: 7.46 INJECTION, SOLUTION INTRAVENOUS at 02:35

## 2025-05-11 RX ADMIN — OXYCODONE 5 MG: 5 TABLET ORAL at 19:49

## 2025-05-11 RX ADMIN — MAGNESIUM SULFATE HEPTAHYDRATE 2 G: 2 INJECTION, SOLUTION INTRAVENOUS at 02:41

## 2025-05-11 RX ADMIN — SODIUM CHLORIDE, POTASSIUM CHLORIDE, SODIUM LACTATE AND CALCIUM CHLORIDE 1000 ML: 600; 310; 30; 20 INJECTION, SOLUTION INTRAVENOUS at 05:26

## 2025-05-11 SDOH — ECONOMIC STABILITY: TRANSPORTATION INSECURITY
IN THE PAST 12 MONTHS, HAS LACK OF RELIABLE TRANSPORTATION KEPT YOU FROM MEDICAL APPOINTMENTS, MEETINGS, WORK OR FROM GETTING THINGS NEEDED FOR DAILY LIVING?: YES

## 2025-05-11 SDOH — ECONOMIC STABILITY: TRANSPORTATION INSECURITY
IN THE PAST 12 MONTHS, HAS THE LACK OF TRANSPORTATION KEPT YOU FROM MEDICAL APPOINTMENTS OR FROM GETTING MEDICATIONS?: YES

## 2025-05-11 ASSESSMENT — PAIN DESCRIPTION - PAIN TYPE
TYPE: ACUTE PAIN
TYPE: CHRONIC PAIN;DEEP SOMATIC PAIN

## 2025-05-11 ASSESSMENT — ENCOUNTER SYMPTOMS
DIARRHEA: 0
DIZZINESS: 0
FEVER: 0
NAUSEA: 0
CHILLS: 0
HEADACHES: 0
VOMITING: 0
PALPITATIONS: 0
HEARTBURN: 0
SHORTNESS OF BREATH: 1
ABDOMINAL PAIN: 0
COUGH: 0

## 2025-05-11 ASSESSMENT — COGNITIVE AND FUNCTIONAL STATUS - GENERAL
SUGGESTED CMS G CODE MODIFIER DAILY ACTIVITY: CJ
DRESSING REGULAR LOWER BODY CLOTHING: A LITTLE
PERSONAL GROOMING: A LITTLE
MOVING FROM LYING ON BACK TO SITTING ON SIDE OF FLAT BED: A LITTLE
SUGGESTED CMS G CODE MODIFIER MOBILITY: CK
MOBILITY SCORE: 18
HELP NEEDED FOR BATHING: A LITTLE
DAILY ACTIVITIY SCORE: 21
STANDING UP FROM CHAIR USING ARMS: A LITTLE
WALKING IN HOSPITAL ROOM: A LITTLE
TURNING FROM BACK TO SIDE WHILE IN FLAT BAD: A LITTLE
CLIMB 3 TO 5 STEPS WITH RAILING: A LITTLE
MOVING TO AND FROM BED TO CHAIR: A LITTLE

## 2025-05-11 ASSESSMENT — LIFESTYLE VARIABLES
TOTAL SCORE: 0
HOW MANY TIMES IN THE PAST YEAR HAVE YOU HAD 5 OR MORE DRINKS IN A DAY: 0
AVERAGE NUMBER OF DAYS PER WEEK YOU HAVE A DRINK CONTAINING ALCOHOL: 0
TOTAL SCORE: 0
EVER HAD A DRINK FIRST THING IN THE MORNING TO STEADY YOUR NERVES TO GET RID OF A HANGOVER: NO
ALCOHOL_USE: NO
HAVE YOU EVER FELT YOU SHOULD CUT DOWN ON YOUR DRINKING: NO
TOTAL SCORE: 0
EVER FELT BAD OR GUILTY ABOUT YOUR DRINKING: NO
ON A TYPICAL DAY WHEN YOU DRINK ALCOHOL HOW MANY DRINKS DO YOU HAVE: 0
CONSUMPTION TOTAL: NEGATIVE
HAVE PEOPLE ANNOYED YOU BY CRITICIZING YOUR DRINKING: NO

## 2025-05-11 ASSESSMENT — FIBROSIS 4 INDEX
FIB4 SCORE: 5.3
FIB4 SCORE: 5.81

## 2025-05-11 ASSESSMENT — COPD QUESTIONNAIRES
DO YOU EVER COUGH UP ANY MUCUS OR PHLEGM?: NO/ONLY WITH OCCASIONAL COLDS OR INFECTIONS
COPD SCREENING SCORE: 3
HAVE YOU SMOKED AT LEAST 100 CIGARETTES IN YOUR ENTIRE LIFE: YES
DURING THE PAST 4 WEEKS HOW MUCH DID YOU FEEL SHORT OF BREATH: NONE/LITTLE OF THE TIME

## 2025-05-11 ASSESSMENT — SOCIAL DETERMINANTS OF HEALTH (SDOH)
WITHIN THE LAST YEAR, HAVE TO BEEN RAPED OR FORCED TO HAVE ANY KIND OF SEXUAL ACTIVITY BY YOUR PARTNER OR EX-PARTNER?: NO
WITHIN THE PAST 12 MONTHS, THE FOOD YOU BOUGHT JUST DIDN'T LAST AND YOU DIDN'T HAVE MONEY TO GET MORE: SOMETIMES TRUE
WITHIN THE LAST YEAR, HAVE YOU BEEN HUMILIATED OR EMOTIONALLY ABUSED IN OTHER WAYS BY YOUR PARTNER OR EX-PARTNER?: NO
WITHIN THE LAST YEAR, HAVE YOU BEEN AFRAID OF YOUR PARTNER OR EX-PARTNER?: NO
WITHIN THE LAST YEAR, HAVE YOU BEEN KICKED, HIT, SLAPPED, OR OTHERWISE PHYSICALLY HURT BY YOUR PARTNER OR EX-PARTNER?: NO
WITHIN THE PAST 12 MONTHS, YOU WORRIED THAT YOUR FOOD WOULD RUN OUT BEFORE YOU GOT THE MONEY TO BUY MORE: SOMETIMES TRUE
IN THE PAST 12 MONTHS, HAS THE ELECTRIC, GAS, OIL, OR WATER COMPANY THREATENED TO SHUT OFF SERVICE IN YOUR HOME?: NO

## 2025-05-11 NOTE — PROGRESS NOTES
4 Eyes Skin Assessment Completed by Hernandez RN and VICTORIA Mares.    Head WDL  Ears WDL  Nose WDL  Mouth dry, missing teeth  Neck WDL  Breast/Chest WDL  Shoulder Blades WDL  Spine WDL  (R) Arm/Elbow/Hand WDL  (L) Arm/Elbow/Hand WDL  Abdomen WDL  Groin WDL  Scrotum/Coccyx/Buttocks Redness and Blanching  (R) Leg WDL  (L) Leg WDL  (R) Heel/Foot/Toe WDL  (L) Heel/Foot/Toe WDL          Devices In Places Tele Box, Blood Pressure Cuff, and Pulse Ox      Interventions In Place Pillows    Possible Skin Injury No    Pictures Uploaded Into Epic N/A  Wound Consult Placed N/A  RN Wound Prevention Protocol Ordered No

## 2025-05-11 NOTE — H&P
Hospital Medicine History & Physical Note    Date of Service  5/11/2025    Primary Care Physician  Moses Yan M.D.    Code Status  Full Code    Chief Complaint  Chief Complaint   Patient presents with    RUQ Pain     X few days, pt states she has had a cough for a couple of weeks that thinks is possibly related    Abdominal Swelling     Hx of Liver failure/cirrhosis r/t ETOH, pt had a paracentesis approx 2 months ago        History of Presenting Illness  Patient is a 44-year-old female with past medical history of alcohol use complicated by decompensated cirrhosis follows with hepatology at [facility] however appears lost to follow-up, medication noncompliance for the past month who presents due to shortness of breath, cough, fever, chills, and general fatigue has been progressively worsening over the past couple of weeks.  Patient states she was driving, pulled over to the side the road as she felt tired and sick.  Ultimately, authorities checked on the patient and stated she needed to go home versus going to the hospital and therefore presented here for evaluation.  Patient was found to have a pulse of 115, respiratory rate of 24, requiring 2 L nasal cannula for 92% saturation, WBC of 11.4 and lactic acid of 6.1.  Chest x-ray demonstrated bilateral airspace opacities concerning for multifocal pneumonia.  Given the above, patient admitted for likely sepsis secondary to pneumonia, 1.5 L of ascitic fluid was drained from the abdomen without SBP findings.  Will provide patient with empiric antibiotic coverage, as well as close monitoring.    I discussed the plan of care with patient.    Review of Systems  ROS    Past Medical History   has a past medical history of ALC (alcoholic liver cirrhosis) (HCC) (7/17/2023), ASTHMA, Gallstone, Hypertension, Low back pain, and PID (acute pelvic inflammatory disease) (05/23/2014).    Surgical History   has a past surgical history that includes IUD Removal (5/25/2014) and pr  "upper gi endoscopy,diagnosis (N/A, 7/19/2023).     Family History  family history is not on file.   Family history reviewed with patient. There is no family history that is pertinent to the chief complaint.     Social History   reports that she has been smoking cigarettes. She does not have any smokeless tobacco history on file. She reports current alcohol use. She reports that she does not use drugs.    Allergies  Allergies   Allergen Reactions    Aspirin Shortness of Breath     Shortness of breath, asthma exacerbation, and \"a congested feeling.\"       Medications  Prior to Admission Medications   Prescriptions Last Dose Informant Patient Reported? Taking?   albuterol (VENTOLIN OR PROVENTIL) 108 (90 BASE) MCG/ACT Aero Soln inhalation aerosol 5/10/2025 Evening Patient No Yes   Sig: Inhale 2 Puffs by mouth every four hours as needed for Shortness of Breath.   budesonide-formoterol (SYMBICORT) 80-4.5 MCG/ACT Aerosol 5/10/2025 Morning  Yes Yes   Sig: Inhale 2 Puffs every day.      Facility-Administered Medications: None       Physical Exam  Pulse:  [114-122] 122  Resp:  [16-32] 24  BP: (120-130)/(62-66) 130/66  SpO2:  [87 %-96 %] 96 %  Blood Pressure: 130/66       Pulse: (!) 122   Respiration: (!) 24   Pulse Oximetry: 96 %       Physical Exam  Vitals and nursing note reviewed.   Constitutional:       General: She is in acute distress.      Appearance: She is ill-appearing.   HENT:      Head: Normocephalic and atraumatic.      Nose: Nose normal.      Mouth/Throat:      Mouth: Mucous membranes are moist.   Eyes:      General: No scleral icterus.     Extraocular Movements: Extraocular movements intact.   Cardiovascular:      Rate and Rhythm: Regular rhythm. Tachycardia present.      Heart sounds: No murmur heard.     No friction rub. No gallop.   Pulmonary:      Breath sounds: No wheezing, rhonchi or rales.   Abdominal:      General: Abdomen is flat. Bowel sounds are normal. There is distension.      Palpations: Abdomen " "is soft.      Tenderness: There is no abdominal tenderness. There is no guarding.   Musculoskeletal:         General: No swelling or tenderness.   Skin:     General: Skin is warm.      Capillary Refill: Capillary refill takes less than 2 seconds.   Neurological:      General: No focal deficit present.      Mental Status: She is alert.   Psychiatric:         Mood and Affect: Mood normal.         Laboratory:  Recent Labs     05/11/25 0114   WBC 11.4*   RBC 3.20*   HEMOGLOBIN 10.1*   HEMATOCRIT 31.7*   MCV 99.1*   MCH 31.6   MCHC 31.9*   RDW 64.7*   PLATELETCT 110*   MPV 9.0     Recent Labs     05/11/25  0114   SODIUM 136   POTASSIUM 3.1*   CHLORIDE 101   CO2 17*   GLUCOSE 87   BUN 3*   CREATININE 0.52   CALCIUM 8.3*     Recent Labs     05/11/25  0114   ALTSGPT 30   ASTSGOT 71*   ALKPHOSPHAT 154*   TBILIRUBIN 11.9*   LIPASE 50   GLUCOSE 87     Recent Labs     05/11/25  0425   INR 2.90*     Recent Labs     05/11/25 0114   NTPROBNP 51         No results for input(s): \"TROPONINT\" in the last 72 hours.    Imaging:  DX-CHEST-PORTABLE (1 VIEW)   Final Result      Airspace opacities throughout both lungs, left more than right, concerning for pneumonia.          X-Ray:  I have personally reviewed the images and compared with prior images.  EKG:  I have personally reviewed the images and compared with prior images.    Assessment/Plan:  Justification for Admission Status  I anticipate this patient will require at least two midnights for appropriate medical management, necessitating inpatient admission because sepsis secondary to multifocal pneumonia    Patient will need a Telemetry bed on MEDICAL service .  The need is secondary to sepsis secondary to multifocal pneumonia.    * Sepsis (HCC)- (present on admission)  Assessment & Plan  This is Sepsis Present on admission  SIRS criteria identified on my evaluation include: Tachycardia, with heart rate greater than 90 BPM and Tachypnea, with respirations greater than 20 per " minute  Clinical indicators of end organ dysfunction include Lactic Acid greater than 2  Source is likely pulmonary  Sepsis protocol initiated  Crystalloid Fluid Administration: Fluid resuscitation ordered per standard protocol - 30 mL/kg per current or ideal body weight  IV antibiotics as appropriate for source of sepsis  Reassessment: I have reassessed the patient's hemodynamic status    Paracentesis of 1.5 L drained in the emergency department, ascitic fluid does not appear to be SBP  Follow blood cultures, sputum cultures  - COVID/flu/RSV unremarkable  - Empiric Unasyn and azithromycin    History of medication noncompliance  Assessment & Plan  Patient states has been noncompliant with her home medications for the past month    Acute hypoxic respiratory failure (HCC)  Assessment & Plan  Patient with acute approximately failure requiring 2 L nasal cannula, baseline room air with chest x-ray presenting multifocal opacities concerning for atypical pneumonia  - Will provide empiric Unasyn and azithromycin    High anion gap metabolic acidosis  Assessment & Plan  Bicarb 17, anion gap 18, lactic acid of 6.0, likely secondary to sepsis with lactic acidosis  - Fluid resuscitation, caution given patient's decompensated cirrhosis    Gross hematuria  Assessment & Plan  Moderate blood noted on urinalysis with few RBCs  - Ordered CPK  - Likely contaminant from menstrual cycle which started today and PureWick    Hyperbilirubinemia- (present on admission)  Assessment & Plan  T. bili of 11.9, appears to be downtrending likely secondary to patient's decompensated cirrhosis    Ovarian mass- (present on admission)  Assessment & Plan  History of ovarian mass with elevated , lost to follow up    Alcoholic cirrhosis (HCC)- (present on admission)  Assessment & Plan  History of decompensated alcoholic cirrhosis followed by [facility] hepatology  - Previously on lactulose and rifaximin, however patient has not been on oral medication  for a month  - Will restart lactulose and titrate bowel movement to 3/day  - Was previously on carvedilol as well, however will hold as patient did not comply with medications in the setting of acute sepsis with tachycardia        VTE prophylaxis: SCDs/TEDs

## 2025-05-11 NOTE — ED TRIAGE NOTES
Chief Complaint   Patient presents with    RUQ Pain     X few days, pt states she has had a cough for a couple of weeks that thinks is possibly related    Abdominal Swelling     Hx of Liver failure/cirrhosis r/t ETOH, pt had a paracentesis approx 2 months ago      Pt BIB EMS from her car for above complaints, A+O x 4, GCS 15, answering questions appropriately

## 2025-05-11 NOTE — PROGRESS NOTES
I will be off duty and signed out of voalte at 3pm.  If you have any needs for this patient after 3pm, please reach out to the on call Aracelis EASTMAN.  Thank you!

## 2025-05-11 NOTE — PROGRESS NOTES
Hospital Medicine Daily Progress Note    Date of Service  5/11/2025    Chief Complaint  Windy Saldivar is a 44 y.o. female admitted 5/11/2025 with shortness of breath.    Hospital Course  Patient is a 44-year-old female with past medical history of alcohol use complicated by decompensated cirrhosis follows with hepatology at [facility] however appears lost to follow-up, medication noncompliance for the past month who presents due to shortness of breath, cough, fever, chills, and general fatigue has been progressively worsening over the past couple of weeks.  Patient states she was driving, pulled over to the side the road as she felt tired and sick.  Ultimately, authorities checked on the patient and stated she needed to go home versus going to the hospital and therefore presented here for evaluation.  Patient was found to have a pulse of 115, respiratory rate of 24, requiring 2 L nasal cannula for 92% saturation, WBC of 11.4 and lactic acid of 6.1.  Chest x-ray demonstrated bilateral airspace opacities concerning for multifocal pneumonia.  Given the above, patient admitted for likely sepsis secondary to pneumonia, 1.5 L of ascitic fluid was drained from the abdomen without SBP findings.  Will provide patient with empiric antibiotic coverage, as well as close monitoring.     Interval Problem Update  5/11 afebrile, slightly tachycardic and on 2 L nasal cannula.  Received paracentesis in the ED at which time they drained 1.5 L.  Trend lactic acid.  Continue lactulose. Check labs in AM.    I have discussed this patient's plan of care and discharge plan at IDT rounds today with Case Management, Nursing, Nursing leadership, and other members of the IDT team.    Consultants/Specialty  none    Code Status  Full Code    Disposition  The patient is not medically cleared for discharge to home or a post-acute facility.  Anticipate discharge to: home with close outpatient follow-up    I have placed the appropriate orders  for post-discharge needs.    Review of Systems  Review of Systems   Constitutional:  Negative for chills, fever and malaise/fatigue.   Respiratory:  Positive for shortness of breath. Negative for cough.    Cardiovascular:  Negative for chest pain, palpitations and leg swelling.   Gastrointestinal:  Negative for abdominal pain, diarrhea, heartburn, nausea and vomiting.   Genitourinary:  Negative for dysuria, frequency and urgency.   Neurological:  Negative for dizziness and headaches.   All other systems reviewed and are negative.       Physical Exam  Temp:  [36.8 °C (98.2 °F)] 36.8 °C (98.2 °F)  Pulse:  [114-122] 121  Resp:  [16-32] 19  BP: (106-130)/(55-72) 115/72  SpO2:  [87 %-96 %] 94 %    Physical Exam  Vitals and nursing note reviewed.   Constitutional:       Appearance: Normal appearance. She is not ill-appearing.   HENT:      Head: Normocephalic and atraumatic.      Jaw: There is normal jaw occlusion.      Right Ear: Hearing normal.      Left Ear: Hearing normal.      Nose: Nose normal.      Mouth/Throat:      Lips: Pink.      Mouth: Mucous membranes are moist.   Eyes:      Extraocular Movements: Extraocular movements intact.      Conjunctiva/sclera: Conjunctivae normal.      Pupils: Pupils are equal, round, and reactive to light.   Neck:      Vascular: No carotid bruit.   Cardiovascular:      Rate and Rhythm: Normal rate and regular rhythm.      Pulses: Normal pulses.      Heart sounds: Normal heart sounds, S1 normal and S2 normal.   Pulmonary:      Effort: Pulmonary effort is normal.      Breath sounds: Normal breath sounds and air entry. No stridor.   Abdominal:      General: There is distension.   Musculoskeletal:      Cervical back: Normal range of motion and neck supple.      Right lower leg: No edema.      Left lower leg: No edema.   Skin:     General: Skin is warm and dry.      Capillary Refill: Capillary refill takes less than 2 seconds.   Neurological:      General: No focal deficit present.       Mental Status: She is alert and oriented to person, place, and time. Mental status is at baseline.      Sensory: Sensation is intact.      Motor: Motor function is intact.   Psychiatric:         Attention and Perception: Attention and perception normal.         Mood and Affect: Mood and affect normal.         Speech: Speech normal.         Behavior: Behavior normal. Behavior is cooperative.         Fluids    Intake/Output Summary (Last 24 hours) at 5/11/2025 0651  Last data filed at 5/11/2025 0642  Gross per 24 hour   Intake 2300 ml   Output 2300 ml   Net 0 ml        Laboratory  Recent Labs     05/11/25  0114   WBC 11.4*   RBC 3.20*   HEMOGLOBIN 10.1*   HEMATOCRIT 31.7*   MCV 99.1*   MCH 31.6   MCHC 31.9*   RDW 64.7*   PLATELETCT 110*   MPV 9.0     Recent Labs     05/11/25  0114   SODIUM 136   POTASSIUM 3.1*   CHLORIDE 101   CO2 17*   GLUCOSE 87   BUN 3*   CREATININE 0.52   CALCIUM 8.3*     Recent Labs     05/11/25  0425   INR 2.90*               Imaging  DX-CHEST-PORTABLE (1 VIEW)   Final Result      Airspace opacities throughout both lungs, left more than right, concerning for pneumonia.           Assessment/Plan  * Sepsis (HCC)- (present on admission)  Assessment & Plan  This is Sepsis Present on admission  SIRS criteria identified on my evaluation include: Tachycardia, with heart rate greater than 90 BPM and Tachypnea, with respirations greater than 20 per minute  Clinical indicators of end organ dysfunction include Lactic Acid greater than 2  Source is likely pulmonary  Sepsis protocol initiated  Crystalloid Fluid Administration: Fluid resuscitation ordered per standard protocol - 30 mL/kg per current or ideal body weight  IV antibiotics as appropriate for source of sepsis  Reassessment: I have reassessed the patient's hemodynamic status    Paracentesis of 1.5 L drained in the emergency department, ascitic fluid does not appear to be SBP  Follow blood cultures, sputum cultures  - COVID/flu/RSV unremarkable  -  Empiric Unasyn and azithromycin    Acute hypoxic respiratory failure (HCC)  Assessment & Plan  Patient with acute approximately failure requiring 2 L nasal cannula, baseline room air with chest x-ray presenting multifocal opacities concerning for atypical pneumonia  - Will provide empiric Unasyn and azithromycin    High anion gap metabolic acidosis  Assessment & Plan  Bicarb 17, anion gap 18, lactic acid of 6.0, likely secondary to sepsis with lactic acidosis  - Fluid resuscitation, caution given patient's decompensated cirrhosis    Gross hematuria  Assessment & Plan  Moderate blood noted on urinalysis with few RBCs  - Ordered CPK  - Likely contaminant from menstrual cycle which started today and PureWick    Alcoholic cirrhosis (HCC)- (present on admission)  Assessment & Plan  History of decompensated alcoholic cirrhosis followed by [facility] hepatology  - Previously on lactulose and rifaximin, however patient has not been on oral medication for a month  - Will restart lactulose and titrate bowel movement to 3/day  - Was previously on carvedilol as well, however will hold as patient did not comply with medications in the setting of acute sepsis with tachycardia    Hyperbilirubinemia- (present on admission)  Assessment & Plan  T. bili of 11.9, appears to be downtrending likely secondary to patient's decompensated cirrhosis    History of medication noncompliance  Assessment & Plan  Patient states has been noncompliant with her home medications for the past month    Ovarian mass- (present on admission)  Assessment & Plan  History of ovarian mass with elevated , lost to follow up         VTE prophylaxis:   SCDs/TEDs   enoxaparin ppx      I have performed a physical exam and reviewed and updated ROS and Plan today (5/11/2025). In review of yesterday's note (5/10/2025), there are no changes except as documented above.

## 2025-05-11 NOTE — ASSESSMENT & PLAN NOTE
This is Sepsis Present on admission  SIRS criteria identified on my evaluation include: Tachycardia, with heart rate greater than 90 BPM and Tachypnea, with respirations greater than 20 per minute  Clinical indicators of end organ dysfunction include persistent lactic acidosis greater than 2  Source is likely pulmonary  Sepsis protocol initiated  Crystalloid Fluid Administration: Fluid resuscitation ordered per standard protocol - 30 mL/kg per current or ideal body weight  IV antibiotics as appropriate for source of sepsis  Reassessment: I have reassessed the patient's hemodynamic status    Lactic 6 on admission, repeat 5.9.  After multiple repeats now within normal limits  Paracentesis of 1.5 L drained in the emergency department, ascitic fluid does not appear to be SBP  Follow blood cultures, sputum cultures  - COVID/flu/RSV unremarkable  - Empiric Unasyn and azithromycin

## 2025-05-11 NOTE — ED NOTES
Pt transported from [facility] to [facility]. Pt placed on wall oxygen and attached to monitor.

## 2025-05-11 NOTE — ED NOTES
Bedside report received from VICTORIA Coffman.  POC discussed with patient. Call light within reach, all needs addressed at this time.     Fall risk interventions in place: Move the patient closer to the nurse's station, Patient's personal possessions are with in their safe reach, Place fall risk sign on patient's door, and Keep floor surfaces clean and dry (all applicable per [place] Fall risk assessment)   Continuous monitoring: Cardiac Leads, Pulse Ox, or Blood Pressure  IVF/IV medications: Not Applicable   Oxygen: How many liters 2L via NC  Bedside sitter: Not Applicable   Isolation: Not Applicable

## 2025-05-11 NOTE — ASSESSMENT & PLAN NOTE
History of decompensated alcoholic cirrhosis followed by [facility] hepatology  - Previously on lactulose and rifaximin, however patient has not been on oral medication for a month  - Will restart lactulose and titrate bowel movement to 3/day  - Was previously on carvedilol as well, however will hold as patient did not comply with medications in the setting of acute sepsis with tachycardia      Continue monitoring  Paracentesis today fluid analysis did not show signs of SBP increase Lasix to 20 mg twice daily increase spironolactone to 50 mg  Started on Coreg  Check ammonia level in a.m.

## 2025-05-11 NOTE — ED NOTES
Pt c/o tenderness and burning to the IV in the L upper arm, this RN removed the IV as requested by pt

## 2025-05-11 NOTE — HOSPITAL COURSE
Patient is a 44-year-old female with past medical history of alcohol use complicated by decompensated cirrhosis follows with hepatology at [facility] however appears lost to follow-up, medication noncompliance for the past month who presents due to shortness of breath, cough, fever, chills, and general fatigue has been progressively worsening over the past couple of weeks.  Patient states she was driving, pulled over to the side the road as she felt tired and sick.  Ultimately, authorities checked on the patient and stated she needed to go home versus going to the hospital and therefore presented here for evaluation.  Patient was found to have a pulse of 115, respiratory rate of 24, requiring 2 L nasal cannula for 92% saturation, WBC of 11.4 and lactic acid of 6.1.  Chest x-ray demonstrated bilateral airspace opacities concerning for multifocal pneumonia.  Given the above, patient admitted for likely sepsis secondary to pneumonia, 1.5 L of ascitic fluid was drained from the abdomen without SBP findings.  Will provide patient with empiric antibiotic coverage, as well as close monitoring.

## 2025-05-11 NOTE — PROGRESS NOTES
Monitor Summary  Rhythm: Sinus Tachycardia  Rate: 107  Ectopy: None Noted  .10 / .08 / .37        12 hr Chart check.

## 2025-05-11 NOTE — ASSESSMENT & PLAN NOTE
Bicarb 17, anion gap 18, lactic acid of 6.0, likely secondary to sepsis with lactic acidosis  - Fluid resuscitation, caution given patient's decompensated cirrhosis

## 2025-05-11 NOTE — ED NOTES
Bedside report received from off going RN/tech: Carissa  , assumed care of patient.  POC discussed with patient. Call light within reach, all needs addressed at this time.       Fall risk interventions in place: Patient's personal possessions are with in their safe reach, Place fall risk sign on patient's door, and Keep floor surfaces clean and dry (all applicable per [place] Fall risk assessment)   Continuous monitoring: Cardiac Leads, Pulse Ox, or Blood Pressure  IVF/IV medications: Infusion per MAR (List Med(s)) k and mag  Oxygen: 2L NC  Bedside sitter: Not Applicable   Isolation: Not Applicable

## 2025-05-11 NOTE — ASSESSMENT & PLAN NOTE
.  Patient with acute approximately failure requiring 2 L nasal cannula, baseline room air with chest x-ray presenting multifocal opacities concerning for atypical pneumonia  - Will provide empiric Unasyn and azithromycin    Continue monitoring  Continue breathing treatments

## 2025-05-11 NOTE — ED PROVIDER NOTES
ED Provider Note    CHIEF COMPLAINT  Chief Complaint   Patient presents with    RUQ Pain     X few days, pt states she has had a cough for a couple of weeks that thinks is possibly related    Abdominal Swelling     Hx of Liver failure/cirrhosis r/t ETOH, pt had a paracentesis approx 2 months ago        EXTERNAL RECORDS REVIEWED  Patient was admitted in February of this year with multifocal pneumonia with right pleural effusion, hypoxic respiratory failure, sepsis, cirrhosis.  She required a thoracentesis.    HPI/ROS  LIMITATION TO HISTORY   Select: : None  OUTSIDE HISTORIAN(S):  None    Windy Saldivar is a 44 y.o. female who presents to the ER for concern of worsening shortness of breath, abdominal distention, and fevers.  Only mild right-sided abdominal pain.  Her last paracentesis was about 2 months ago.  She thinks she also might have ovarian cancer but has no formal diagnosis yet.  She called EMS and was hypoxic, placed on oxygen which did help her feel better.  She has not been coughing anything up.  No leg swelling.  No chest pain.    PAST MEDICAL HISTORY   has a past medical history of ALC (alcoholic liver cirrhosis) (HCC) (2023), ASTHMA, Gallstone, Hypertension, Low back pain, and PID (acute pelvic inflammatory disease) (2014).    SURGICAL HISTORY   has a past surgical history that includes IUD Removal (2014) and upper gi endoscopy,diagnosis (N/A, 2023).    FAMILY HISTORY  No family history on file.    SOCIAL HISTORY  Social History     Tobacco Use    Smoking status: Every Day     Current packs/day: 0.00     Types: Cigarettes     Last attempt to quit: 2013     Years since quittin.5    Smokeless tobacco: Not on file    Tobacco comments:     quit 5 mos ago   Substance and Sexual Activity    Alcohol use: Yes     Comment: 2x week    Drug use: No    Sexual activity: Not on file       CURRENT MEDICATIONS  Home Medications       Reviewed by Med Humphrey (Pharmacy Tech)  "on 05/11/25 at 0406  Med List Status: Complete     Medication Last Dose Status   albuterol (VENTOLIN OR PROVENTIL) 108 (90 BASE) MCG/ACT Aero Soln inhalation aerosol 5/10/2025 Active   budesonide-formoterol (SYMBICORT) 80-4.5 MCG/ACT Aerosol 5/10/2025 Active                  Audit from Redirected Encounters    **Home medications have not yet been reviewed for this encounter**         ALLERGIES  Allergies   Allergen Reactions    Aspirin Shortness of Breath     Shortness of breath, asthma exacerbation, and \"a congested feeling.\"       PHYSICAL EXAM  VITAL SIGNS: /66   Pulse (!) 122   Resp (!) 24   Ht 1.524 m (5')   Wt 68 kg (150 lb)   LMP 05/11/2025 (Exact Date)   SpO2 96%   BMI 29.29 kg/m²    General: Laying calmly in stretcher, no distress  HEENT: NCAT, moist mucous membranes, scleral icterus present  CV: Tachycardic no murmurs  Pulmonary: Bibasilar crackles, slight tachypnea  Abdomen: Distended, no significant tenderness no rebound or guarding  MSK: No swelling in the lower extremities    EKG/LABS  CBC with leukocytosis of 11.4 stable anemia hemoglobin 10.1, chronic thrombocytopenia platelets 110.  CMP with hypokalemia 3.1 mildly elevated anion gap metabolic acidosis bicarb 17 gap of 18.  AST elevated 71.  Bilirubin is 11.9 similar to previous.  Lipase normal pregnancy negative.  Urinalysis without infectious markers.  Procalcitonin normal.  BNP is normal.  Lactic acid 6.1, 6.0 on repeat prior to any intervention.    EKG at 1:48 AM shows sinus tachycardia rate 115 normal axis normal intervals.  There are no ischemic changes no STEMI.  I have independently interpreted this EKG    RADIOLOGY/PROCEDURES   I have independently interpreted the diagnostic imaging associated with this visit and am waiting the final reading from the radiologist.   My preliminary interpretation is as follows: Bilateral infiltrates    Radiologist interpretation:  DX-CHEST-PORTABLE (1 VIEW)   Final Result      Airspace opacities " throughout both lungs, left more than right, concerning for pneumonia.      Point of Care Ultrasound    Indication: Ascites    Consent: The patient was counseled regarding the procedure, it's indications, risks, potential complications and alternatives and any questions were answered. Consent was obtained.    Procedure: The patient was placed in the supine position with the head of the bed slightly elevated and bedside ultrasound was utilized to identify Free Fluid Identified without nearby bowel or critical anatomy and image retained as below.  Static image obtained with skin marked. The skin over the puncture site in the left lower quadrant region was prepped with chlorhexidine and draped in a sterile fashion. Local anesthesia was obtained by infiltration using 1% Lidocaine with epinephrine. A paracentesis catheter was then advanced into the abdominal cavity over a needle and the needle was withdrawn. Fluid was returned which was clear and serous.  A total volume of 1500 cc was withdrawn which was sent to the lab for cell count and differential, glucose, and gram stain and culture. The catheter was then withdrawn and a sterile dressing was placed over the site.     The patient tolerated the procedure well.    Complications: None    Image retained through [facility] as seen below:               This study is a limited ultrasound examination performed and interpreted to evaluate for limited conditions as outlined above. There may be other clinically important information contained in the images that is outside this scope. When clinically warranted, a comprehensive ultrasound through the appropriate department is considered.      COURSE & MEDICAL DECISION MAKING    ASSESSMENT, COURSE AND PLAN  Care Narrative: Differential includes pneumonia, sepsis, SBP, electrolyte derangement, CHF, viral syndrome, among others    On arrival the patient is tachycardic but she is afebrile and has normal blood pressure.  She was hypoxic  and ultimately required 2 L nasal cannula.  She has crackles in both lung fields.  Abdomen is distended, very minimal tenderness.  No peripheral edema noted.  Differential above is considered.  X-ray was obtained which showed bilateral infiltrates, given her symptoms concerning for possible pneumonia so ceftriaxone and azithromycin were ordered but also 40 mg IV Lasix as well.  EKG showed sinus tachycardia no ischemic changes.  CBC shows a slight leukocytosis of 11.4 but her procalcitonin is normal, unclear if this is infectious or not.  Stable anemia and thrombocytopenia.  She has hypokalemia so she was given oral and IV replacement as well as magnesium sulfate.  Slight metabolic acidosis with elevated anion gap, likely from her lactic acidosis of 6.1, which I do suspect is largely secondary to her liver disease as she is not showing any other signs of hypoperfusion.  Stable hyperbilirubinemia 11.9.  No sign of UTI.  Paracentesis performed as above.  Patient feeling somewhat better after this.  The ceftriaxone was given after the paracentesis, not before.  Fluid studies are pending.  At this time she is also receiving 1 L IV fluids and albumin after the paracentesis with her lactic acidosis.  I spoke with the hospitalist Dr. Jackson who accepted the patient for admission in stable condition.    DISPOSITION AND DISCUSSIONS  I have discussed management of the patient with the following physicians and NERISSA's: Hospitalist as above    Discussion of management with other QHP or appropriate source(s): None     Escalation of care considered, and ultimately not performed: N/A      FINAL DIAGNOSIS  1. Pneumonia of both lungs due to infectious organism, unspecified part of lung    2. Hypokalemia    3. Alcoholic cirrhosis of liver with ascites (HCC)         Electronically signed by: Valeriano Strange M.D., 5/11/2025 1:12 AM

## 2025-05-11 NOTE — ED NOTES
Patient medicated per MAR. Patient educated and verbalized understanding. Patient provided with warm blanket.

## 2025-05-11 NOTE — ASSESSMENT & PLAN NOTE
Moderate blood noted on urinalysis with few RBCs  - Ordered CPK  - Likely contaminant from menstrual cycle which started today and PureWick    Resolved

## 2025-05-12 PROBLEM — R78.81 POSITIVE BLOOD CULTURE: Status: ACTIVE | Noted: 2025-05-12

## 2025-05-12 PROBLEM — R65.21 SEPTIC SHOCK (HCC): Status: ACTIVE | Noted: 2025-05-11

## 2025-05-12 LAB
ALBUMIN SERPL BCP-MCNC: 2.9 G/DL (ref 3.2–4.9)
ALBUMIN/GLOB SERPL: 0.9 G/DL
ALP SERPL-CCNC: 131 U/L (ref 30–99)
ALT SERPL-CCNC: 31 U/L (ref 2–50)
ANION GAP SERPL CALC-SCNC: 11 MMOL/L (ref 7–16)
AST SERPL-CCNC: 79 U/L (ref 12–45)
BASOPHILS # BLD AUTO: 0.8 % (ref 0–1.8)
BASOPHILS # BLD: 0.06 K/UL (ref 0–0.12)
BILIRUB SERPL-MCNC: 13.1 MG/DL (ref 0.1–1.5)
BUN SERPL-MCNC: 5 MG/DL (ref 8–22)
CALCIUM ALBUM COR SERPL-MCNC: 9 MG/DL (ref 8.5–10.5)
CALCIUM SERPL-MCNC: 8.1 MG/DL (ref 8.5–10.5)
CHLORIDE SERPL-SCNC: 106 MMOL/L (ref 96–112)
CO2 SERPL-SCNC: 20 MMOL/L (ref 20–33)
COMMENT 1642: NORMAL
CREAT SERPL-MCNC: 0.37 MG/DL (ref 0.5–1.4)
EOSINOPHIL # BLD AUTO: 0.46 K/UL (ref 0–0.51)
EOSINOPHIL NFR BLD: 6 % (ref 0–6.9)
ERYTHROCYTE [DISTWIDTH] IN BLOOD BY AUTOMATED COUNT: 65.6 FL (ref 35.9–50)
GFR SERPLBLD CREATININE-BSD FMLA CKD-EPI: 126 ML/MIN/1.73 M 2
GLOBULIN SER CALC-MCNC: 3.1 G/DL (ref 1.9–3.5)
GLUCOSE SERPL-MCNC: 121 MG/DL (ref 65–99)
HCT VFR BLD AUTO: 28 % (ref 37–47)
HGB BLD-MCNC: 8.9 G/DL (ref 12–16)
IMM GRANULOCYTES # BLD AUTO: 0.07 K/UL (ref 0–0.11)
IMM GRANULOCYTES NFR BLD AUTO: 0.9 % (ref 0–0.9)
LYMPHOCYTES # BLD AUTO: 1.39 K/UL (ref 1–4.8)
LYMPHOCYTES NFR BLD: 18.1 % (ref 22–41)
MAGNESIUM SERPL-MCNC: 1.7 MG/DL (ref 1.5–2.5)
MCH RBC QN AUTO: 31.4 PG (ref 27–33)
MCHC RBC AUTO-ENTMCNC: 31.8 G/DL (ref 32.2–35.5)
MCV RBC AUTO: 98.9 FL (ref 81.4–97.8)
MONOCYTES # BLD AUTO: 0.71 K/UL (ref 0–0.85)
MONOCYTES NFR BLD AUTO: 9.2 % (ref 0–13.4)
MORPHOLOGY BLD-IMP: NORMAL
NEUTROPHILS # BLD AUTO: 4.99 K/UL (ref 1.82–7.42)
NEUTROPHILS NFR BLD: 65 % (ref 44–72)
NRBC # BLD AUTO: 0 K/UL
NRBC BLD-RTO: 0 /100 WBC (ref 0–0.2)
PLATELET # BLD AUTO: 90 K/UL (ref 164–446)
PLATELET BLD QL SMEAR: NORMAL
PLATELETS.RETICULATED NFR BLD AUTO: 1.7 % (ref 0.6–13.1)
PMV BLD AUTO: 9.6 FL (ref 9–12.9)
POTASSIUM SERPL-SCNC: 3.1 MMOL/L (ref 3.6–5.5)
PROT SERPL-MCNC: 6 G/DL (ref 6–8.2)
RBC # BLD AUTO: 2.83 M/UL (ref 4.2–5.4)
RBC BLD AUTO: NORMAL
SODIUM SERPL-SCNC: 137 MMOL/L (ref 135–145)
WBC # BLD AUTO: 7.7 K/UL (ref 4.8–10.8)

## 2025-05-12 PROCEDURE — 700111 HCHG RX REV CODE 636 W/ 250 OVERRIDE (IP): Performed by: INTERNAL MEDICINE

## 2025-05-12 PROCEDURE — 80053 COMPREHEN METABOLIC PANEL: CPT

## 2025-05-12 PROCEDURE — 99233 SBSQ HOSP IP/OBS HIGH 50: CPT | Performed by: INTERNAL MEDICINE

## 2025-05-12 PROCEDURE — 700105 HCHG RX REV CODE 258

## 2025-05-12 PROCEDURE — A9270 NON-COVERED ITEM OR SERVICE: HCPCS

## 2025-05-12 PROCEDURE — 700111 HCHG RX REV CODE 636 W/ 250 OVERRIDE (IP): Mod: JZ

## 2025-05-12 PROCEDURE — 700102 HCHG RX REV CODE 250 W/ 637 OVERRIDE(OP)

## 2025-05-12 PROCEDURE — 85025 COMPLETE CBC W/AUTO DIFF WBC: CPT

## 2025-05-12 PROCEDURE — 36415 COLL VENOUS BLD VENIPUNCTURE: CPT

## 2025-05-12 PROCEDURE — A9270 NON-COVERED ITEM OR SERVICE: HCPCS | Mod: JZ | Performed by: INTERNAL MEDICINE

## 2025-05-12 PROCEDURE — 700102 HCHG RX REV CODE 250 W/ 637 OVERRIDE(OP): Mod: JZ | Performed by: INTERNAL MEDICINE

## 2025-05-12 PROCEDURE — 85055 RETICULATED PLATELET ASSAY: CPT

## 2025-05-12 PROCEDURE — 83735 ASSAY OF MAGNESIUM: CPT

## 2025-05-12 PROCEDURE — 770020 HCHG ROOM/CARE - TELE (206)

## 2025-05-12 RX ORDER — MAGNESIUM SULFATE HEPTAHYDRATE 40 MG/ML
2 INJECTION, SOLUTION INTRAVENOUS ONCE
Status: COMPLETED | OUTPATIENT
Start: 2025-05-12 | End: 2025-05-12

## 2025-05-12 RX ORDER — POTASSIUM CHLORIDE 1500 MG/1
40 TABLET, EXTENDED RELEASE ORAL 3 TIMES DAILY
Status: COMPLETED | OUTPATIENT
Start: 2025-05-12 | End: 2025-05-12

## 2025-05-12 RX ADMIN — MOMETASONE FUROATE AND FORMOTEROL FUMARATE DIHYDRATE 2 PUFF: 100; 5 AEROSOL RESPIRATORY (INHALATION) at 17:13

## 2025-05-12 RX ADMIN — AMPICILLIN SODIUM, SULBACTAM SODIUM 3 G: 2; 1 INJECTION, POWDER, FOR SOLUTION INTRAMUSCULAR; INTRAVENOUS at 17:42

## 2025-05-12 RX ADMIN — POTASSIUM CHLORIDE 40 MEQ: 1500 TABLET, EXTENDED RELEASE ORAL at 11:19

## 2025-05-12 RX ADMIN — LACTULOSE 30 ML: 10 SOLUTION ORAL at 05:24

## 2025-05-12 RX ADMIN — LACTULOSE 30 ML: 10 SOLUTION ORAL at 11:19

## 2025-05-12 RX ADMIN — MAGNESIUM SULFATE HEPTAHYDRATE 2 G: 2 INJECTION, SOLUTION INTRAVENOUS at 11:22

## 2025-05-12 RX ADMIN — AMPICILLIN SODIUM, SULBACTAM SODIUM 3 G: 2; 1 INJECTION, POWDER, FOR SOLUTION INTRAMUSCULAR; INTRAVENOUS at 05:27

## 2025-05-12 RX ADMIN — MOMETASONE FUROATE AND FORMOTEROL FUMARATE DIHYDRATE 2 PUFF: 100; 5 AEROSOL RESPIRATORY (INHALATION) at 05:24

## 2025-05-12 RX ADMIN — AMPICILLIN SODIUM, SULBACTAM SODIUM 3 G: 2; 1 INJECTION, POWDER, FOR SOLUTION INTRAMUSCULAR; INTRAVENOUS at 13:31

## 2025-05-12 RX ADMIN — OXYCODONE 5 MG: 5 TABLET ORAL at 00:13

## 2025-05-12 RX ADMIN — OXYCODONE 5 MG: 5 TABLET ORAL at 05:23

## 2025-05-12 RX ADMIN — AMPICILLIN SODIUM, SULBACTAM SODIUM 3 G: 2; 1 INJECTION, POWDER, FOR SOLUTION INTRAMUSCULAR; INTRAVENOUS at 00:15

## 2025-05-12 RX ADMIN — AZITHROMYCIN DIHYDRATE 500 MG: 250 TABLET ORAL at 05:24

## 2025-05-12 RX ADMIN — POTASSIUM CHLORIDE 40 MEQ: 1500 TABLET, EXTENDED RELEASE ORAL at 17:12

## 2025-05-12 RX ADMIN — LACTULOSE 30 ML: 10 SOLUTION ORAL at 17:12

## 2025-05-12 RX ADMIN — OXYCODONE 5 MG: 5 TABLET ORAL at 22:35

## 2025-05-12 ASSESSMENT — ENCOUNTER SYMPTOMS
VOMITING: 0
NAUSEA: 0
PALPITATIONS: 0
SHORTNESS OF BREATH: 1
ABDOMINAL PAIN: 0
FEVER: 0
CHILLS: 0
DIARRHEA: 0
COUGH: 0
HEADACHES: 0
HEARTBURN: 0
DIZZINESS: 0

## 2025-05-12 ASSESSMENT — PAIN DESCRIPTION - PAIN TYPE
TYPE: ACUTE PAIN

## 2025-05-12 ASSESSMENT — FIBROSIS 4 INDEX: FIB4 SCORE: 7.09

## 2025-05-12 NOTE — DISCHARGE PLANNING
Case Management Discharge Planning    Admission Date: 5/11/2025  GMLOS: 4.9  ALOS: 1    6-Clicks ADL Score: 21  6-Clicks Mobility Score: 18      Anticipated Discharge Dispo: Discharge Disposition: Discharged to home/self care (01)  Discharge Address: [address]  [place] [address]  Discharge Contact Phone Number: [phone]    DME Needed: Pending hospital course    Action(s) Taken: DC Assessment Complete (See below) Chart reviewed and pt discussed in IDT rounds pt is not [facility] for DC due to pneumonia. BC positive.     Escalations Completed: None    Medically Clear: No    Next Steps: F/U with HCM needs     Barriers to Discharge: Medical clearance    Is the patient up for discharge tomorrow: No        Care Transition Team Assessment  LMSW met with pt to conduct assessment and verify demographics. Pt is AOX4 and provided all of the following information below. Pt was admitted on 5/11/25 for Sepsis (HCC).Please see pt's H&P for prior medical history.    Pt's PCP is LUPE MONTILLA M.D.    Pt verified Po Box on file. Pt [place] physical address [place] of [address] [place] [address]. Pt lives with her mother in law and her .     Pt's emergency contact on file is her mother Mandie Leonardo [phone].    Prior to admission pt stated that she was independent with ADLS and IADLS with no use of DME. Pt does not use O2 at [facility].    Pt denies history of SNF/HH/IPR.    Pt's preferred pharmacy is [facility].    Pt's insurance is [facility] [employer].    Upon DC pt has means of transportation home via family.    Information Source  Information Given By: Patient    Readmission Evaluation  Is this a readmission?: No    Elopement Risk  Legal Hold: No  Ambulatory or Self Mobile in Wheelchair: No-Not an Elopement Risk  Disoriented: No  Psychiatric Symptoms: None  History of Wandering: No  Elopement this Admit: No  Vocalizing Wanting to Leave: No  Displays Behaviors, Body Language Wanting to Leave: No-Not at Risk for  Elopement  Elopement Risk: Not at Risk for Elopement    Interdisciplinary Discharge Planning  Primary Care Physician: LUPE MONTILLA M.D  Lives with - Patient's Self Care Capacity: Spouse  Patient or legal guardian wants to designate a caregiver: No  Support Systems: Family Member(s)  Housing / Facility: [address]    Discharge Preparedness  What is your plan after discharge?: Home with help  What are your discharge supports?: Parent, Spouse  Prior Functional Level: Ambulatory, Independent with Activities of Daily Living, Independent with Medication Management  Difficulity with ADLs: None  Difficulity with IADLs: None    Functional Assesment  Prior Functional Level: Ambulatory, Independent with Activities of Daily Living, Independent with Medication Management    Finances  Financial Barriers to Discharge: No  Prescription Coverage: Yes    Vision / Hearing Impairment  Vision Impairment : No  Hearing Impairment : No    Advance Directive  Advance Directive?: None    Domestic Abuse  Have you ever been the victim of abuse or violence?: No  Possible Abuse/Neglect Reported to:: Not Applicable    Psychological Assessment  History of Substance Abuse: None  History of Psychiatric Problems: No  Non-compliant with Treatment: No  Newly Diagnosed Illness: No    Discharge Risks or Barriers  Discharge risks or barriers?: No    Anticipated Discharge Information  Discharge Disposition: Discharged to home/self care (01)  Discharge Address: [facility] [address]  [facility] [address]  Discharge Contact Phone Number: [phone]

## 2025-05-12 NOTE — PROGRESS NOTES
Hospital Medicine Daily Progress Note    Date of Service  5/12/2025    Chief Complaint  Windy Saldivar is a 44 y.o. female admitted 5/11/2025 with shortness of breath.    Hospital Course  Patient is a 44-year-old female with past medical history of alcohol use complicated by decompensated cirrhosis follows with hepatology at [facility] however appears lost to follow-up, medication noncompliance for the past month who presents due to shortness of breath, cough, fever, chills, and general fatigue has been progressively worsening over the past couple of weeks.  Patient states she was driving, pulled over to the side the road as she felt tired and sick.  Ultimately, authorities checked on the patient and stated she needed to go home versus going to the hospital and therefore presented here for evaluation.  Patient was found to have a pulse of 115, respiratory rate of 24, requiring 2 L nasal cannula for 92% saturation, WBC of 11.4 and lactic acid of 6.1.  Chest x-ray demonstrated bilateral airspace opacities concerning for multifocal pneumonia.  Given the above, patient admitted for likely sepsis secondary to pneumonia, 1.5 L of ascitic fluid was drained from the abdomen without SBP findings.  Will provide patient with empiric antibiotic coverage, as well as close monitoring.     Interval Problem Update  5/11 afebrile, slightly tachycardic and on 2 L nasal cannula.  Received paracentesis in the ED at which time they drained 1.5 L.  Trend lactic acid.  Continue lactulose. Check labs in AM.    5/12 afebrile, tachycardia improving on 1.5 L nasal cannula  WBC 7.7, hemoglobin 8.9, platelets 90  Potassium 3.1, replaced  Magnesium 1.7, IV replacement  last lactic 1.7  Extended respiratory viral panel negative  1/2 blood cultures with gram-positive cocci in clusters, pending speciation  Paracentesis culture negative to date  Telemetry reviewed patient in sinus tachycardia overnight  Patient very fatigued.  Having  significant pleuritic chest pain with coughing.  Also having diffuse abdominal pain, denies nausea.    I have discussed this patient's plan of care and discharge plan at IDT rounds today with Case Management, Nursing, Nursing leadership, and other members of the IDT team.    Consultants/Specialty  none    Code Status  Full Code    Disposition  The patient is not medically cleared for discharge to home or a post-acute facility.  Anticipate discharge to: home with close outpatient follow-up    I have placed the appropriate orders for post-discharge needs.    Review of Systems  Review of Systems   Constitutional:  Negative for chills, fever and malaise/fatigue.   Respiratory:  Positive for shortness of breath. Negative for cough.    Cardiovascular:  Negative for chest pain, palpitations and leg swelling.   Gastrointestinal:  Negative for abdominal pain, diarrhea, heartburn, nausea and vomiting.   Genitourinary:  Negative for dysuria, frequency and urgency.   Neurological:  Negative for dizziness and headaches.   All other systems reviewed and are negative.       Physical Exam  Temp:  [36 °C (96.8 °F)-36.5 °C (97.7 °F)] 36 °C (96.8 °F)  Pulse:  [] 91  Resp:  [16-20] 16  BP: (131-149)/(73-89) 131/73  SpO2:  [92 %-97 %] 92 %    Physical Exam  Vitals and nursing note reviewed.   Constitutional:       Appearance: Normal appearance. She is not ill-appearing.   HENT:      Head: Normocephalic and atraumatic.      Jaw: There is normal jaw occlusion.      Right Ear: Hearing normal.      Left Ear: Hearing normal.      Nose: Nose normal.      Mouth/Throat:      Lips: Pink.      Mouth: Mucous membranes are moist.   Eyes:      Extraocular Movements: Extraocular movements intact.      Conjunctiva/sclera: Conjunctivae normal.      Pupils: Pupils are equal, round, and reactive to light.   Neck:      Vascular: No carotid bruit.   Cardiovascular:      Rate and Rhythm: Normal rate and regular rhythm.      Pulses: Normal pulses.       Heart sounds: Normal heart sounds, S1 normal and S2 normal.   Pulmonary:      Effort: Pulmonary effort is normal.      Breath sounds: Normal breath sounds and air entry. No stridor.   Abdominal:      General: There is distension.   Musculoskeletal:      Cervical back: Normal range of motion and neck supple.      Right lower leg: No edema.      Left lower leg: No edema.   Skin:     General: Skin is warm and dry.      Capillary Refill: Capillary refill takes less than 2 seconds.   Neurological:      General: No focal deficit present.      Mental Status: She is alert and oriented to person, place, and time. Mental status is at baseline.      Sensory: Sensation is intact.      Motor: Motor function is intact.   Psychiatric:         Attention and Perception: Attention and perception normal.         Mood and Affect: Mood and affect normal.         Speech: Speech normal.         Behavior: Behavior normal. Behavior is cooperative.         Fluids    Intake/Output Summary (Last 24 hours) at 5/12/2025 1803  Last data filed at 5/12/2025 1241  Gross per 24 hour   Intake 450 ml   Output 0 ml   Net 450 ml        Laboratory  Recent Labs     05/11/25  0114 05/12/25  0056   WBC 11.4* 7.7   RBC 3.20* 2.83*   HEMOGLOBIN 10.1* 8.9*   HEMATOCRIT 31.7* 28.0*   MCV 99.1* 98.9*   MCH 31.6 31.4   MCHC 31.9* 31.8*   RDW 64.7* 65.6*   PLATELETCT 110* 90*   MPV 9.0 9.6     Recent Labs     05/11/25  0114 05/12/25  0056   SODIUM 136 137   POTASSIUM 3.1* 3.1*   CHLORIDE 101 106   CO2 17* 20   GLUCOSE 87 121*   BUN 3* 5*   CREATININE 0.52 0.37*   CALCIUM 8.3* 8.1*     Recent Labs     05/11/25  0425   INR 2.90*               Imaging  DX-CHEST-PORTABLE (1 VIEW)   Final Result      Airspace opacities throughout both lungs, left more than right, concerning for pneumonia.           Assessment/Plan  * Septic shock (HCC)- (present on admission)  Assessment & Plan  This is Sepsis Present on admission  SIRS criteria identified on my evaluation include:  Tachycardia, with heart rate greater than 90 BPM and Tachypnea, with respirations greater than 20 per minute  Clinical indicators of end organ dysfunction include persistent lactic acidosis greater than 2  Source is likely pulmonary  Sepsis protocol initiated  Crystalloid Fluid Administration: Fluid resuscitation ordered per standard protocol - 30 mL/kg per current or ideal body weight  IV antibiotics as appropriate for source of sepsis  Reassessment: I have reassessed the patient's hemodynamic status    Lactic 6 on admission, repeat 5.9.  After multiple repeats now within normal limits  Paracentesis of 1.5 L drained in the emergency department, ascitic fluid does not appear to be SBP  Follow blood cultures, sputum cultures  - COVID/flu/RSV unremarkable  - Empiric Unasyn and azithromycin    Positive blood culture- (present on admission)  Assessment & Plan  1/2 blood culture positive for gram-positive cocci in clusters, pending speciation  -Possible contaminant?  Repeat blood cultures 5/13      History of medication noncompliance  Assessment & Plan  Patient states has been noncompliant with her home medications for the past month    Acute hypoxic respiratory failure (HCC)  Assessment & Plan  Patient with acute approximately failure requiring 2 L nasal cannula, baseline room air with chest x-ray presenting multifocal opacities concerning for atypical pneumonia  - Will provide empiric Unasyn and azithromycin    High anion gap metabolic acidosis  Assessment & Plan  Bicarb 17, anion gap 18, lactic acid of 6.0, likely secondary to sepsis with lactic acidosis  - Fluid resuscitation, caution given patient's decompensated cirrhosis    Gross hematuria  Assessment & Plan  Moderate blood noted on urinalysis with few RBCs  - Ordered CPK  - Likely contaminant from menstrual cycle which started today and PureWick    Hyperbilirubinemia- (present on admission)  Assessment & Plan  T. bili of 11.9, appears to be downtrending likely  secondary to patient's decompensated cirrhosis    Ovarian mass- (present on admission)  Assessment & Plan  History of ovarian mass with elevated , lost to follow up    Alcoholic cirrhosis (HCC)- (present on admission)  Assessment & Plan  History of decompensated alcoholic cirrhosis followed by [facility] hepatology  - Previously on lactulose and rifaximin, however patient has not been on oral medication for a month  - Will restart lactulose and titrate bowel movement to 3/day  - Was previously on carvedilol as well, however will hold as patient did not comply with medications in the setting of acute sepsis with tachycardia         VTE prophylaxis:   Lovenox ppx     I have performed a physical exam and reviewed and updated ROS and Plan today (5/12/2025). In review of yesterday's note (5/11/2025), there are no changes except as documented above.

## 2025-05-12 NOTE — CARE PLAN
The patient is Watcher - Medium risk of patient condition declining or worsening    Shift Goals  Clinical Goals: abx, monitor labs, pain management  Patient Goals: pain control, rest  Family Goals: cristian    Progress made toward(s) clinical / shift goals:    Problem: Pain - Standard  Goal: Alleviation of pain or a reduction in pain to the patient’s comfort goal  Outcome: Progressing     Problem: Knowledge Deficit - Standard  Goal: Patient and family/care givers will demonstrate understanding of plan of care, disease process/condition, diagnostic tests and medications  Outcome: Progressing     Problem: Hemodynamics  Goal: Patient's hemodynamics, fluid balance and neurologic status will be stable or improve  Outcome: Progressing     Problem: Respiratory  Goal: Patient will achieve/maintain optimum respiratory ventilation and gas exchange  Outcome: Progressing       Patient is not progressing towards the following goals:

## 2025-05-12 NOTE — CARE PLAN
Problem: Pain - Standard  Goal: Alleviation of pain or a reduction in pain to the patient’s comfort goal  Outcome: Progressing  Pt's reported no pain during this RN's shift.      Problem: Knowledge Deficit - Standard  Goal: Patient and family/care givers will demonstrate understanding of plan of care, disease process/condition, diagnostic tests and medications  Outcome: Progressing     Problem: Hemodynamics  Goal: Patient's hemodynamics, fluid balance and neurologic status will be stable or improve  Outcome: Progressing     Problem: Fluid Volume  Goal: Fluid volume balance will be maintained  Outcome: Progressing     Problem: Urinary - Renal Perfusion  Goal: Ability to achieve and maintain adequate renal perfusion and functioning will improve  Outcome: Progressing     Problem: Respiratory  Goal: Patient will achieve/maintain optimum respiratory ventilation and gas exchange  Outcome: Progressing  Pt remains on 1.5L via nasal cannula     Problem: Physical Regulation  Goal: Diagnostic test results will improve  Outcome: Progressing  Goal: Signs and symptoms of infection will decrease  Outcome: Progressing     Problem: Fall Risk  Goal: Patient will remain free from falls  Outcome: Progressing  Pt's bed is in lowest position. Pt has all belongings within reach. Pt has call light within reach   The patient is Stable - Low risk of patient condition declining or worsening    Shift Goals  Clinical Goals: abx, monitor labs, pain management  Patient Goals: pain control, rest  Family Goals: cristian    Progress made toward(s) clinical / shift goals:    Pt remains on IV abx.      Patient is not progressing towards the following goals:

## 2025-05-13 LAB
ALBUMIN SERPL BCP-MCNC: 2.7 G/DL (ref 3.2–4.9)
ANION GAP SERPL CALC-SCNC: 8 MMOL/L (ref 7–16)
BACTERIA BLD CULT: ABNORMAL
BACTERIA BLD CULT: ABNORMAL
BUN SERPL-MCNC: 5 MG/DL (ref 8–22)
CALCIUM ALBUM COR SERPL-MCNC: 9.2 MG/DL (ref 8.5–10.5)
CALCIUM SERPL-MCNC: 8.2 MG/DL (ref 8.5–10.5)
CHLORIDE SERPL-SCNC: 106 MMOL/L (ref 96–112)
CO2 SERPL-SCNC: 22 MMOL/L (ref 20–33)
CREAT SERPL-MCNC: 0.29 MG/DL (ref 0.5–1.4)
ERYTHROCYTE [DISTWIDTH] IN BLOOD BY AUTOMATED COUNT: 64.3 FL (ref 35.9–50)
GFR SERPLBLD CREATININE-BSD FMLA CKD-EPI: 134 ML/MIN/1.73 M 2
GLUCOSE SERPL-MCNC: 119 MG/DL (ref 65–99)
HCT VFR BLD AUTO: 27.4 % (ref 37–47)
HGB BLD-MCNC: 8.7 G/DL (ref 12–16)
MCH RBC QN AUTO: 31.1 PG (ref 27–33)
MCHC RBC AUTO-ENTMCNC: 31.8 G/DL (ref 32.2–35.5)
MCV RBC AUTO: 97.9 FL (ref 81.4–97.8)
PHOSPHATE SERPL-MCNC: 2.8 MG/DL (ref 2.5–4.5)
PLATELET # BLD AUTO: 97 K/UL (ref 164–446)
PLATELETS.RETICULATED NFR BLD AUTO: 2.3 % (ref 0.6–13.1)
PMV BLD AUTO: 9.8 FL (ref 9–12.9)
POTASSIUM SERPL-SCNC: 3.9 MMOL/L (ref 3.6–5.5)
RBC # BLD AUTO: 2.8 M/UL (ref 4.2–5.4)
SIGNIFICANT IND 70042: ABNORMAL
SITE SITE: ABNORMAL
SODIUM SERPL-SCNC: 136 MMOL/L (ref 135–145)
SOURCE SOURCE: ABNORMAL
WBC # BLD AUTO: 7.3 K/UL (ref 4.8–10.8)

## 2025-05-13 PROCEDURE — 87040 BLOOD CULTURE FOR BACTERIA: CPT | Mod: 91

## 2025-05-13 PROCEDURE — 700101 HCHG RX REV CODE 250

## 2025-05-13 PROCEDURE — 700111 HCHG RX REV CODE 636 W/ 250 OVERRIDE (IP): Mod: JZ | Performed by: INTERNAL MEDICINE

## 2025-05-13 PROCEDURE — 85027 COMPLETE CBC AUTOMATED: CPT

## 2025-05-13 PROCEDURE — 85055 RETICULATED PLATELET ASSAY: CPT

## 2025-05-13 PROCEDURE — 770020 HCHG ROOM/CARE - TELE (206)

## 2025-05-13 PROCEDURE — 700102 HCHG RX REV CODE 250 W/ 637 OVERRIDE(OP)

## 2025-05-13 PROCEDURE — 99233 SBSQ HOSP IP/OBS HIGH 50: CPT | Performed by: HOSPITALIST

## 2025-05-13 PROCEDURE — 700105 HCHG RX REV CODE 258

## 2025-05-13 PROCEDURE — 80069 RENAL FUNCTION PANEL: CPT

## 2025-05-13 PROCEDURE — A9270 NON-COVERED ITEM OR SERVICE: HCPCS

## 2025-05-13 PROCEDURE — 94640 AIRWAY INHALATION TREATMENT: CPT

## 2025-05-13 PROCEDURE — 700111 HCHG RX REV CODE 636 W/ 250 OVERRIDE (IP): Mod: JZ

## 2025-05-13 PROCEDURE — 700101 HCHG RX REV CODE 250: Performed by: HOSPITALIST

## 2025-05-13 PROCEDURE — 36415 COLL VENOUS BLD VENIPUNCTURE: CPT

## 2025-05-13 RX ORDER — IPRATROPIUM BROMIDE AND ALBUTEROL SULFATE 2.5; .5 MG/3ML; MG/3ML
3 SOLUTION RESPIRATORY (INHALATION)
Status: DISPENSED | OUTPATIENT
Start: 2025-05-13 | End: 2025-05-14

## 2025-05-13 RX ADMIN — IPRATROPIUM BROMIDE AND ALBUTEROL SULFATE 3 ML: .5; 2.5 SOLUTION RESPIRATORY (INHALATION) at 18:59

## 2025-05-13 RX ADMIN — OXYCODONE 5 MG: 5 TABLET ORAL at 02:54

## 2025-05-13 RX ADMIN — MOMETASONE FUROATE AND FORMOTEROL FUMARATE DIHYDRATE 2 PUFF: 100; 5 AEROSOL RESPIRATORY (INHALATION) at 17:56

## 2025-05-13 RX ADMIN — AMPICILLIN SODIUM, SULBACTAM SODIUM 3 G: 2; 1 INJECTION, POWDER, FOR SOLUTION INTRAMUSCULAR; INTRAVENOUS at 00:51

## 2025-05-13 RX ADMIN — ENOXAPARIN SODIUM 40 MG: 100 INJECTION SUBCUTANEOUS at 17:56

## 2025-05-13 RX ADMIN — LACTULOSE 30 ML: 10 SOLUTION ORAL at 06:42

## 2025-05-13 RX ADMIN — IPRATROPIUM BROMIDE AND ALBUTEROL SULFATE 3 ML: .5; 2.5 SOLUTION RESPIRATORY (INHALATION) at 01:43

## 2025-05-13 RX ADMIN — LACTULOSE 30 ML: 10 SOLUTION ORAL at 12:48

## 2025-05-13 RX ADMIN — MOMETASONE FUROATE AND FORMOTEROL FUMARATE DIHYDRATE 2 PUFF: 100; 5 AEROSOL RESPIRATORY (INHALATION) at 06:43

## 2025-05-13 RX ADMIN — AMPICILLIN SODIUM, SULBACTAM SODIUM 3 G: 2; 1 INJECTION, POWDER, FOR SOLUTION INTRAMUSCULAR; INTRAVENOUS at 06:48

## 2025-05-13 RX ADMIN — AZITHROMYCIN DIHYDRATE 500 MG: 250 TABLET ORAL at 06:42

## 2025-05-13 RX ADMIN — AMPICILLIN SODIUM, SULBACTAM SODIUM 3 G: 2; 1 INJECTION, POWDER, FOR SOLUTION INTRAMUSCULAR; INTRAVENOUS at 12:48

## 2025-05-13 RX ADMIN — LACTULOSE 30 ML: 10 SOLUTION ORAL at 17:56

## 2025-05-13 RX ADMIN — AMPICILLIN SODIUM, SULBACTAM SODIUM 3 G: 2; 1 INJECTION, POWDER, FOR SOLUTION INTRAMUSCULAR; INTRAVENOUS at 17:56

## 2025-05-13 ASSESSMENT — ENCOUNTER SYMPTOMS
HEARTBURN: 0
DIARRHEA: 0
ABDOMINAL PAIN: 0
COUGH: 0
DIZZINESS: 0
VOMITING: 0
PALPITATIONS: 0
HEADACHES: 0
FEVER: 0
CHILLS: 0
SHORTNESS OF BREATH: 1
NAUSEA: 0

## 2025-05-13 ASSESSMENT — FIBROSIS 4 INDEX: FIB4 SCORE: 6.58

## 2025-05-13 ASSESSMENT — PAIN DESCRIPTION - PAIN TYPE
TYPE: ACUTE PAIN

## 2025-05-13 NOTE — CARE PLAN
The patient is Watcher - Medium risk of patient condition declining or worsening    Shift Goals  Clinical Goals: monitor labs, pain management  Patient Goals: rest, pain management  Family Goals: updates    Progress made toward(s) clinical / shift goals:  Administered PRN medication for pain, RT administered PRN nebulizer treatment      Problem: Respiratory  Goal: Patient will achieve/maintain optimum respiratory ventilation and gas exchange  Outcome: Progressing     Problem: Pain - Standard  Goal: Alleviation of pain or a reduction in pain to the patient’s comfort goal  Outcome: Progressing     Problem: Knowledge Deficit - Standard  Goal: Patient and family/care givers will demonstrate understanding of plan of care, disease process/condition, diagnostic tests and medications  Outcome: Progressing

## 2025-05-13 NOTE — ASSESSMENT & PLAN NOTE
1/2 blood culture positive for gram-positive cocci in clusters, pending speciation  -Possible contaminant?  Repeat blood cultures 5/13

## 2025-05-13 NOTE — PROGRESS NOTES
Hospital Medicine Daily Progress Note    Date of Service  5/13/2025    Chief Complaint  Windy Saldivar is a 44 y.o. female admitted 5/11/2025 with shortness of breath.    Hospital Course  Patient is a 44-year-old female with past medical history of alcohol use complicated by decompensated cirrhosis follows with hepatology at [facility] however appears lost to follow-up, medication noncompliance for the past month who presents due to shortness of breath, cough, fever, chills, and general fatigue has been progressively worsening over the past couple of weeks.  Patient states she was driving, pulled over to the side the road as she felt tired and sick.  Ultimately, authorities checked on the patient and stated she needed to go home versus going to the hospital and therefore presented here for evaluation.  Patient was found to have a pulse of 115, respiratory rate of 24, requiring 2 L nasal cannula for 92% saturation, WBC of 11.4 and lactic acid of 6.1.  Chest x-ray demonstrated bilateral airspace opacities concerning for multifocal pneumonia.  Given the above, patient admitted for likely sepsis secondary to pneumonia, 1.5 L of ascitic fluid was drained from the abdomen without SBP findings.  Will provide patient with empiric antibiotic coverage, as well as close monitoring.     Interval Problem Update  5/11 afebrile, slightly tachycardic and on 2 L nasal cannula.  Received paracentesis in the ED at which time they drained 1.5 L.  Trend lactic acid.  Continue lactulose. Check labs in AM.    5/12 afebrile, tachycardia improving on 1.5 L nasal cannula  WBC 7.7, hemoglobin 8.9, platelets 90  Potassium 3.1, replaced  Magnesium 1.7, IV replacement  last lactic 1.7  Extended respiratory viral panel negative  1/2 blood cultures with gram-positive cocci in clusters, pending speciation  Paracentesis culture negative to date  Telemetry reviewed patient in sinus tachycardia overnight  Patient very fatigued.  Having  significant pleuritic chest pain with coughing.  Also having diffuse abdominal pain, denies nausea.      5/13 patient is in bed, she is alert oriented follows commands, she is still coughing and slightly short of breath, trying to wean her off oxygen, cultures probably contamination, discussed with bedside nurse charge nurse  pharmacist, continue close monitoring, hopefully DC home in a day or 2 if able to wean off oxygen and cultures remain negative,    I have discussed this patient's plan of care and discharge plan at IDT rounds today with Case Management, Nursing, Nursing leadership, and other members of the IDT team.    Consultants/Specialty  none    Code Status  Full Code    Disposition  The patient is not medically cleared for discharge to home or a post-acute facility.      I have placed the appropriate orders for post-discharge needs.    Review of Systems  Review of Systems   Constitutional:  Negative for chills, fever and malaise/fatigue.   Respiratory:  Positive for shortness of breath. Negative for cough.    Cardiovascular:  Negative for chest pain, palpitations and leg swelling.   Gastrointestinal:  Negative for abdominal pain, diarrhea, heartburn, nausea and vomiting.   Genitourinary:  Negative for dysuria, frequency and urgency.   Neurological:  Negative for dizziness and headaches.   All other systems reviewed and are negative.       Physical Exam  Temp:  [36 °C (96.8 °F)-36.6 °C (97.9 °F)] 36.6 °C (97.9 °F)  Pulse:  [] 103  Resp:  [14-20] 20  BP: (114-135)/(73-84) 135/81  SpO2:  [91 %-98 %] 91 %    Physical Exam  Vitals and nursing note reviewed.   Constitutional:       Appearance: Normal appearance. She is not ill-appearing.   HENT:      Head: Normocephalic and atraumatic.      Jaw: There is normal jaw occlusion.      Right Ear: Hearing normal.      Left Ear: Hearing normal.      Nose: Nose normal.      Mouth/Throat:      Lips: Pink.      Mouth: Mucous membranes are moist.   Eyes:       Extraocular Movements: Extraocular movements intact.      Conjunctiva/sclera: Conjunctivae normal.      Pupils: Pupils are equal, round, and reactive to light.   Neck:      Vascular: No carotid bruit.   Cardiovascular:      Rate and Rhythm: Normal rate and regular rhythm.      Pulses: Normal pulses.      Heart sounds: Normal heart sounds, S1 normal and S2 normal.   Pulmonary:      Effort: Pulmonary effort is normal.      Breath sounds: Normal breath sounds and air entry. No stridor.   Abdominal:      General: There is distension.   Musculoskeletal:      Cervical back: Normal range of motion and neck supple.      Right lower leg: No edema.      Left lower leg: No edema.   Skin:     General: Skin is warm and dry.      Capillary Refill: Capillary refill takes less than 2 seconds.   Neurological:      General: No focal deficit present.      Mental Status: She is alert and oriented to person, place, and time. Mental status is at baseline.      Sensory: Sensation is intact.      Motor: Motor function is intact.   Psychiatric:         Attention and Perception: Attention and perception normal.         Mood and Affect: Mood and affect normal.         Speech: Speech normal.         Behavior: Behavior normal. Behavior is cooperative.         Fluids    Intake/Output Summary (Last 24 hours) at 5/13/2025 1554  Last data filed at 5/13/2025 0121  Gross per 24 hour   Intake 120 ml   Output 400 ml   Net -280 ml        Laboratory  Recent Labs     05/11/25  0114 05/12/25  0056 05/13/25  0129   WBC 11.4* 7.7 7.3   RBC 3.20* 2.83* 2.80*   HEMOGLOBIN 10.1* 8.9* 8.7*   HEMATOCRIT 31.7* 28.0* 27.4*   MCV 99.1* 98.9* 97.9*   MCH 31.6 31.4 31.1   MCHC 31.9* 31.8* 31.8*   RDW 64.7* 65.6* 64.3*   PLATELETCT 110* 90* 97*   MPV 9.0 9.6 9.8     Recent Labs     05/11/25  0114 05/12/25  0056 05/13/25  0129   SODIUM 136 137 136   POTASSIUM 3.1* 3.1* 3.9   CHLORIDE 101 106 106   CO2 17* 20 22   GLUCOSE 87 121* 119*   BUN 3* 5* 5*   CREATININE  0.52 0.37* 0.29*   CALCIUM 8.3* 8.1* 8.2*     Recent Labs     05/11/25  0425   INR 2.90*               Imaging  DX-CHEST-PORTABLE (1 VIEW)   Final Result      Airspace opacities throughout both lungs, left more than right, concerning for pneumonia.           Assessment/Plan  * Septic shock (HCC)- (present on admission)  Assessment & Plan  This is Sepsis Present on admission  SIRS criteria identified on my evaluation include: Tachycardia, with heart rate greater than 90 BPM and Tachypnea, with respirations greater than 20 per minute  Clinical indicators of end organ dysfunction include persistent lactic acidosis greater than 2  Source is likely pulmonary  Sepsis protocol initiated  Crystalloid Fluid Administration: Fluid resuscitation ordered per standard protocol - 30 mL/kg per current or ideal body weight  IV antibiotics as appropriate for source of sepsis  Reassessment: I have reassessed the patient's hemodynamic status    Lactic 6 on admission, repeat 5.9.  After multiple repeats now within normal limits  Paracentesis of 1.5 L drained in the emergency department, ascitic fluid does not appear to be SBP  Follow blood cultures, sputum cultures  - COVID/flu/RSV unremarkable  - Empiric Unasyn and azithromycin    Positive blood culture- (present on admission)  Assessment & Plan  1/2 blood culture positive for gram-positive cocci in clusters, pending speciation  -Possible contaminant?  Repeat blood cultures 5/13      History of medication noncompliance  Assessment & Plan  Patient states has been noncompliant with her home medications for the past month    Acute hypoxic respiratory failure (HCC)  Assessment & Plan  Patient with acute approximately failure requiring 2 L nasal cannula, baseline room air with chest x-ray presenting multifocal opacities concerning for atypical pneumonia  - Will provide empiric Unasyn and azithromycin    Continue monitoring    High anion gap metabolic acidosis  Assessment & Plan  Bicarb 17,  anion gap 18, lactic acid of 6.0, likely secondary to sepsis with lactic acidosis  - Fluid resuscitation, caution given patient's decompensated cirrhosis    Gross hematuria  Assessment & Plan  Moderate blood noted on urinalysis with few RBCs  - Ordered CPK  - Likely contaminant from menstrual cycle which started today and PureWick    Resolved    Hyperbilirubinemia- (present on admission)  Assessment & Plan  T. bili of 11.9, appears to be downtrending likely secondary to patient's decompensated cirrhosis    Ovarian mass- (present on admission)  Assessment & Plan  History of ovarian mass with elevated , lost to follow up.    Please follow-up as outpatient    Alcoholic cirrhosis (HCC)- (present on admission)  Assessment & Plan  History of decompensated alcoholic cirrhosis followed by [facility] hepatology  - Previously on lactulose and rifaximin, however patient has not been on oral medication for a month  - Will restart lactulose and titrate bowel movement to 3/day  - Was previously on carvedilol as well, however will hold as patient did not comply with medications in the setting of acute sepsis with tachycardia      Continue monitoring         VTE prophylaxis:   Lovenox ppx       Total time of 52 minutes spent prepping to see patient (e.g. reviewing  tests/imaging results, notes from consultants, bedside nurse, night shift ) obtaining and/or reviewing separately obtained history. Performing a medically appropriate examination and evaluation.  Counseling and educating the patient.  Ordering medications, tests, or procedures.  Referring and communicating with other health care professionals.  Documenting clinical information in EPIC.  Independently interpreting results and communicating results to patient.  Care coordination.

## 2025-05-13 NOTE — PROGRESS NOTES
Bedside report received from Breanne KESSLER at this time. Patient walking to bathroom at this time, steady gait, does have some SOB with exertion with walking. Denies pain. On tele SR 95 at this time. Using call bell approp ,bed locked

## 2025-05-14 ENCOUNTER — APPOINTMENT (OUTPATIENT)
Dept: RADIOLOGY | Facility: MEDICAL CENTER | Age: 45
DRG: 871 | End: 2025-05-14
Attending: HOSPITALIST
Payer: COMMERCIAL

## 2025-05-14 PROBLEM — E87.1 HYPONATREMIA: Status: ACTIVE | Noted: 2025-05-14

## 2025-05-14 LAB
ALBUMIN FLD-MCNC: 0.3 G/DL
ALBUMIN SERPL BCP-MCNC: 2.7 G/DL (ref 3.2–4.9)
ALBUMIN/GLOB SERPL: 0.8 G/DL
ALP SERPL-CCNC: 129 U/L (ref 30–99)
ALT SERPL-CCNC: 32 U/L (ref 2–50)
ANION GAP SERPL CALC-SCNC: 7 MMOL/L (ref 7–16)
APPEARANCE FLD: CLEAR
AST SERPL-CCNC: 84 U/L (ref 12–45)
BACTERIA FLD AEROBE CULT: NORMAL
BILIRUB SERPL-MCNC: 9.5 MG/DL (ref 0.1–1.5)
BODY FLD TYPE: NORMAL
BODY FLD TYPE: NORMAL
BUN SERPL-MCNC: 6 MG/DL (ref 8–22)
CALCIUM ALBUM COR SERPL-MCNC: 9 MG/DL (ref 8.5–10.5)
CALCIUM SERPL-MCNC: 8 MG/DL (ref 8.5–10.5)
CELLS FLD: 6
CHLORIDE SERPL-SCNC: 104 MMOL/L (ref 96–112)
CO2 SERPL-SCNC: 23 MMOL/L (ref 20–33)
COLOR FLD: YELLOW
CREAT SERPL-MCNC: 0.33 MG/DL (ref 0.5–1.4)
ERYTHROCYTE [DISTWIDTH] IN BLOOD BY AUTOMATED COUNT: 66.5 FL (ref 35.9–50)
GFR SERPLBLD CREATININE-BSD FMLA CKD-EPI: 130 ML/MIN/1.73 M 2
GLOBULIN SER CALC-MCNC: 3.3 G/DL (ref 1.9–3.5)
GLUCOSE SERPL-MCNC: 114 MG/DL (ref 65–99)
GRAM STN SPEC: NORMAL
GRAM STN SPEC: NORMAL
HCT VFR BLD AUTO: 28.7 % (ref 37–47)
HGB BLD-MCNC: 8.8 G/DL (ref 12–16)
LYMPHOCYTES NFR FLD: 32 %
MAGNESIUM SERPL-MCNC: 1.6 MG/DL (ref 1.5–2.5)
MCH RBC QN AUTO: 31.1 PG (ref 27–33)
MCHC RBC AUTO-ENTMCNC: 30.7 G/DL (ref 32.2–35.5)
MCV RBC AUTO: 101.4 FL (ref 81.4–97.8)
MONOS+MACROS NFR FLD MANUAL: 31 %
NEUTROPHILS NFR FLD: 30 %
NUC CELL # FLD: 126 CELLS/UL
PLATELET # BLD AUTO: 94 K/UL (ref 164–446)
PLATELETS.RETICULATED NFR BLD AUTO: 2.7 % (ref 0.6–13.1)
PMV BLD AUTO: 9.4 FL (ref 9–12.9)
POTASSIUM SERPL-SCNC: 3.6 MMOL/L (ref 3.6–5.5)
PROT FLD-MCNC: 0.5 G/DL
PROT SERPL-MCNC: 6 G/DL (ref 6–8.2)
RBC # BLD AUTO: 2.83 M/UL (ref 4.2–5.4)
RBC # FLD: <2000 CELLS/UL
SIGNIFICANT IND 70042: NORMAL
SIGNIFICANT IND 70042: NORMAL
SITE SITE: NORMAL
SITE SITE: NORMAL
SODIUM SERPL-SCNC: 134 MMOL/L (ref 135–145)
SOURCE SOURCE: NORMAL
SOURCE SOURCE: NORMAL
WBC # BLD AUTO: 7 K/UL (ref 4.8–10.8)
WBC OTHER NFR FLD: 1 %

## 2025-05-14 PROCEDURE — 99233 SBSQ HOSP IP/OBS HIGH 50: CPT | Performed by: HOSPITALIST

## 2025-05-14 PROCEDURE — 87205 SMEAR GRAM STAIN: CPT

## 2025-05-14 PROCEDURE — 770020 HCHG ROOM/CARE - TELE (206)

## 2025-05-14 PROCEDURE — 700111 HCHG RX REV CODE 636 W/ 250 OVERRIDE (IP): Mod: JZ

## 2025-05-14 PROCEDURE — 87070 CULTURE OTHR SPECIMN AEROBIC: CPT

## 2025-05-14 PROCEDURE — 700102 HCHG RX REV CODE 250 W/ 637 OVERRIDE(OP)

## 2025-05-14 PROCEDURE — A9270 NON-COVERED ITEM OR SERVICE: HCPCS

## 2025-05-14 PROCEDURE — 82042 OTHER SOURCE ALBUMIN QUAN EA: CPT

## 2025-05-14 PROCEDURE — 700101 HCHG RX REV CODE 250

## 2025-05-14 PROCEDURE — 49083 ABD PARACENTESIS W/IMAGING: CPT

## 2025-05-14 PROCEDURE — 80503 PATH CLIN CONSLTJ SF 5-20: CPT

## 2025-05-14 PROCEDURE — 85027 COMPLETE CBC AUTOMATED: CPT

## 2025-05-14 PROCEDURE — 700111 HCHG RX REV CODE 636 W/ 250 OVERRIDE (IP): Mod: JZ | Performed by: HOSPITALIST

## 2025-05-14 PROCEDURE — 85055 RETICULATED PLATELET ASSAY: CPT

## 2025-05-14 PROCEDURE — 700102 HCHG RX REV CODE 250 W/ 637 OVERRIDE(OP): Performed by: HOSPITALIST

## 2025-05-14 PROCEDURE — A9270 NON-COVERED ITEM OR SERVICE: HCPCS | Performed by: HOSPITALIST

## 2025-05-14 PROCEDURE — 94640 AIRWAY INHALATION TREATMENT: CPT

## 2025-05-14 PROCEDURE — 83735 ASSAY OF MAGNESIUM: CPT

## 2025-05-14 PROCEDURE — 71045 X-RAY EXAM CHEST 1 VIEW: CPT

## 2025-05-14 PROCEDURE — 700105 HCHG RX REV CODE 258

## 2025-05-14 PROCEDURE — 0W9G3ZZ DRAINAGE OF PERITONEAL CAVITY, PERCUTANEOUS APPROACH: ICD-10-PCS

## 2025-05-14 PROCEDURE — 89051 BODY FLUID CELL COUNT: CPT

## 2025-05-14 PROCEDURE — 84157 ASSAY OF PROTEIN OTHER: CPT

## 2025-05-14 PROCEDURE — 80053 COMPREHEN METABOLIC PANEL: CPT

## 2025-05-14 PROCEDURE — 87015 SPECIMEN INFECT AGNT CONCNTJ: CPT

## 2025-05-14 PROCEDURE — 36415 COLL VENOUS BLD VENIPUNCTURE: CPT

## 2025-05-14 RX ORDER — SPIRONOLACTONE 25 MG/1
25 TABLET ORAL
Status: DISCONTINUED | OUTPATIENT
Start: 2025-05-14 | End: 2025-05-15

## 2025-05-14 RX ORDER — GUAIFENESIN 600 MG/1
600 TABLET, EXTENDED RELEASE ORAL EVERY 12 HOURS
Status: DISCONTINUED | OUTPATIENT
Start: 2025-05-14 | End: 2025-05-17 | Stop reason: HOSPADM

## 2025-05-14 RX ORDER — BENZONATATE 100 MG/1
100 CAPSULE ORAL 3 TIMES DAILY
Status: DISCONTINUED | OUTPATIENT
Start: 2025-05-14 | End: 2025-05-17 | Stop reason: HOSPADM

## 2025-05-14 RX ORDER — FUROSEMIDE 10 MG/ML
20 INJECTION INTRAMUSCULAR; INTRAVENOUS DAILY
Status: DISCONTINUED | OUTPATIENT
Start: 2025-05-14 | End: 2025-05-15

## 2025-05-14 RX ADMIN — MOMETASONE FUROATE AND FORMOTEROL FUMARATE DIHYDRATE 2 PUFF: 100; 5 AEROSOL RESPIRATORY (INHALATION) at 06:00

## 2025-05-14 RX ADMIN — IPRATROPIUM BROMIDE AND ALBUTEROL SULFATE 3 ML: .5; 2.5 SOLUTION RESPIRATORY (INHALATION) at 11:36

## 2025-05-14 RX ADMIN — FUROSEMIDE 20 MG: 10 INJECTION, SOLUTION INTRAVENOUS at 18:28

## 2025-05-14 RX ADMIN — BENZONATATE 100 MG: 100 CAPSULE ORAL at 18:29

## 2025-05-14 RX ADMIN — LACTULOSE 30 ML: 10 SOLUTION ORAL at 18:28

## 2025-05-14 RX ADMIN — AMOXICILLIN AND CLAVULANATE POTASSIUM 1 TABLET: 875; 125 TABLET, FILM COATED ORAL at 18:29

## 2025-05-14 RX ADMIN — MOMETASONE FUROATE AND FORMOTEROL FUMARATE DIHYDRATE 2 PUFF: 100; 5 AEROSOL RESPIRATORY (INHALATION) at 18:32

## 2025-05-14 RX ADMIN — AMPICILLIN SODIUM, SULBACTAM SODIUM 3 G: 2; 1 INJECTION, POWDER, FOR SOLUTION INTRAMUSCULAR; INTRAVENOUS at 11:18

## 2025-05-14 RX ADMIN — GUAIFENESIN 600 MG: 600 TABLET, EXTENDED RELEASE ORAL at 18:28

## 2025-05-14 RX ADMIN — GUAIFENESIN 600 MG: 600 TABLET, EXTENDED RELEASE ORAL at 11:17

## 2025-05-14 RX ADMIN — LACTULOSE 30 ML: 10 SOLUTION ORAL at 05:49

## 2025-05-14 RX ADMIN — AMPICILLIN SODIUM, SULBACTAM SODIUM 3 G: 2; 1 INJECTION, POWDER, FOR SOLUTION INTRAMUSCULAR; INTRAVENOUS at 00:30

## 2025-05-14 RX ADMIN — SPIRONOLACTONE 25 MG: 25 TABLET ORAL at 18:29

## 2025-05-14 RX ADMIN — AMPICILLIN SODIUM, SULBACTAM SODIUM 3 G: 2; 1 INJECTION, POWDER, FOR SOLUTION INTRAMUSCULAR; INTRAVENOUS at 05:51

## 2025-05-14 RX ADMIN — LACTULOSE 30 ML: 10 SOLUTION ORAL at 11:17

## 2025-05-14 RX ADMIN — BENZONATATE 100 MG: 100 CAPSULE ORAL at 11:17

## 2025-05-14 ASSESSMENT — ENCOUNTER SYMPTOMS
SHORTNESS OF BREATH: 1
ABDOMINAL PAIN: 0
NAUSEA: 0
COUGH: 0
FEVER: 0
CHILLS: 0
DIARRHEA: 0
VOMITING: 0
HEARTBURN: 0
DIZZINESS: 0
HEADACHES: 0
PALPITATIONS: 0

## 2025-05-14 ASSESSMENT — FIBROSIS 4 INDEX: FIB4 SCORE: 6.58

## 2025-05-14 ASSESSMENT — PAIN DESCRIPTION - PAIN TYPE: TYPE: ACUTE PAIN

## 2025-05-14 NOTE — CARE PLAN
The patient is Watcher - Medium risk of patient condition declining or worsening    Shift Goals  Clinical Goals: safety, pain control, wean o2, diurese  Patient Goals: pain control  Family Goals: cristian    Progress made toward(s) clinical / shift goals:  Pt understands/uses nonpharm methods for pain control, pt O2 sat low to mid 90s on RA      Problem: Pain - Standard  Goal: Alleviation of pain or a reduction in pain to the patient’s comfort goal  Outcome: Progressing     Problem: Respiratory  Goal: Patient will achieve/maintain optimum respiratory ventilation and gas exchange  Outcome: Progressing

## 2025-05-14 NOTE — PROGRESS NOTES
Patient ambulated to restroom and complained of SOB on the way back. Strong productive cough noted with clear thick sputum, LS with inspiratory wheezes to upper lobes, satting 92% on room air. patient placed on 2L 02 and assisted back to bed. Denies chest pain, remains SR 80

## 2025-05-14 NOTE — DISCHARGE PLANNING
Case Management Discharge Planning    Admission Date: 5/11/2025  GMLOS: 4.9  ALOS: 3    6-Clicks ADL Score: 21  6-Clicks Mobility Score: 18      Anticipated Discharge Dispo: Discharge Disposition: Discharged to home/self care (01)  Discharge Address: [place] [id number]  Discharge Contact Phone Number: [phone]    DME Needed: Pending hospital course     Action(s) Taken: Chart reviewed and pt discussed in IDT Rounds plan for paracentesis today. Plan for DC tomorrow.     Escalations Completed: None    Medically Clear: No    Next Steps: F/U with HCM needs     Barriers to Discharge: Medical clearance    Is the patient up for discharge tomorrow: No

## 2025-05-14 NOTE — PROGRESS NOTES
Bedside report received from off going RN/tech: Breanne, assumed care of patient.     Fall Risk Score: MODERATE RISK  Fall risk interventions in place: Place yellow fall risk ID band on patient, Provide patient/family education based on risk assessment, Educate patient/family to call staff for assistance when getting out of bed, Place fall precaution signage outside patient door, Place patient in room close to nursing station, Utilize bed/chair fall alarm, and Bed alarm connected correctly  Bed type: Regular (Goyo Score less than 17 interventions in place)  Patient on cardiac monitor: Yes  IVF/IV medications: Not Applicable   Oxygen: Room Air  Bedside sitter: Not Applicable   Isolation: Not applicable

## 2025-05-14 NOTE — CARE PLAN
The patient is Watcher - Medium risk of patient condition declining or worsening    Shift Goals  Clinical Goals: hemodynamic stability  Patient Goals: rest, comfort  Family Goals: cristian    Progress made toward(s) clinical / shift goals:    Problem: Pain - Standard  Goal: Alleviation of pain or a reduction in pain to the patient’s comfort goal  Outcome: Progressing     Problem: Knowledge Deficit - Standard  Goal: Patient and family/care givers will demonstrate understanding of plan of care, disease process/condition, diagnostic tests and medications  Outcome: Progressing     Problem: Fluid Volume  Goal: Fluid volume balance will be maintained  Outcome: Progressing     Problem: Urinary - Renal Perfusion  Goal: Ability to achieve and maintain adequate renal perfusion and functioning will improve  Outcome: Progressing     Problem: Respiratory  Goal: Patient will achieve/maintain optimum respiratory ventilation and gas exchange  Outcome: Progressing     Problem: Physical Regulation  Goal: Diagnostic test results will improve  Outcome: Progressing  Goal: Signs and symptoms of infection will decrease  Outcome: Progressing     Problem: Fall Risk  Goal: Patient will remain free from falls  Outcome: Progressing     Problem: Skin Integrity  Goal: Skin integrity is maintained or improved  Outcome: Progressing       Patient is not progressing towards the following goals:

## 2025-05-15 ENCOUNTER — APPOINTMENT (OUTPATIENT)
Dept: RADIOLOGY | Facility: MEDICAL CENTER | Age: 45
DRG: 871 | End: 2025-05-15
Attending: HOSPITALIST
Payer: COMMERCIAL

## 2025-05-15 PROBLEM — Z87.19 HISTORY OF ESOPHAGEAL VARICES: Status: ACTIVE | Noted: 2025-05-15

## 2025-05-15 LAB
ALBUMIN SERPL BCP-MCNC: 2.7 G/DL (ref 3.2–4.9)
ALBUMIN/GLOB SERPL: 0.8 G/DL
ALP SERPL-CCNC: 149 U/L (ref 30–99)
ALT SERPL-CCNC: 34 U/L (ref 2–50)
ANION GAP SERPL CALC-SCNC: 11 MMOL/L (ref 7–16)
AST SERPL-CCNC: 88 U/L (ref 12–45)
BILIRUB SERPL-MCNC: 8.3 MG/DL (ref 0.1–1.5)
BUN SERPL-MCNC: 6 MG/DL (ref 8–22)
CALCIUM ALBUM COR SERPL-MCNC: 9 MG/DL (ref 8.5–10.5)
CALCIUM SERPL-MCNC: 8 MG/DL (ref 8.5–10.5)
CHLORIDE SERPL-SCNC: 104 MMOL/L (ref 96–112)
CO2 SERPL-SCNC: 22 MMOL/L (ref 20–33)
CREAT SERPL-MCNC: 0.42 MG/DL (ref 0.5–1.4)
ERYTHROCYTE [DISTWIDTH] IN BLOOD BY AUTOMATED COUNT: 67.2 FL (ref 35.9–50)
GFR SERPLBLD CREATININE-BSD FMLA CKD-EPI: 123 ML/MIN/1.73 M 2
GLOBULIN SER CALC-MCNC: 3.4 G/DL (ref 1.9–3.5)
GLUCOSE SERPL-MCNC: 131 MG/DL (ref 65–99)
HCT VFR BLD AUTO: 29.1 % (ref 37–47)
HGB BLD-MCNC: 9.2 G/DL (ref 12–16)
MAGNESIUM SERPL-MCNC: 1.5 MG/DL (ref 1.5–2.5)
MCH RBC QN AUTO: 31.6 PG (ref 27–33)
MCHC RBC AUTO-ENTMCNC: 31.6 G/DL (ref 32.2–35.5)
MCV RBC AUTO: 100 FL (ref 81.4–97.8)
PATH REV: NORMAL
PATH REV: NORMAL
PLATELET # BLD AUTO: 89 K/UL (ref 164–446)
PLATELETS.RETICULATED NFR BLD AUTO: 1.6 % (ref 0.6–13.1)
PMV BLD AUTO: 9.6 FL (ref 9–12.9)
POTASSIUM SERPL-SCNC: 3.2 MMOL/L (ref 3.6–5.5)
PROT SERPL-MCNC: 6.1 G/DL (ref 6–8.2)
RBC # BLD AUTO: 2.91 M/UL (ref 4.2–5.4)
SODIUM SERPL-SCNC: 137 MMOL/L (ref 135–145)
WBC # BLD AUTO: 7.2 K/UL (ref 4.8–10.8)

## 2025-05-15 PROCEDURE — 700102 HCHG RX REV CODE 250 W/ 637 OVERRIDE(OP): Performed by: HOSPITALIST

## 2025-05-15 PROCEDURE — 85055 RETICULATED PLATELET ASSAY: CPT

## 2025-05-15 PROCEDURE — 36415 COLL VENOUS BLD VENIPUNCTURE: CPT

## 2025-05-15 PROCEDURE — 700111 HCHG RX REV CODE 636 W/ 250 OVERRIDE (IP): Performed by: HOSPITALIST

## 2025-05-15 PROCEDURE — 770001 HCHG ROOM/CARE - MED/SURG/GYN PRIV*

## 2025-05-15 PROCEDURE — 700102 HCHG RX REV CODE 250 W/ 637 OVERRIDE(OP): Performed by: PHYSICIAN ASSISTANT

## 2025-05-15 PROCEDURE — 80053 COMPREHEN METABOLIC PANEL: CPT

## 2025-05-15 PROCEDURE — A9270 NON-COVERED ITEM OR SERVICE: HCPCS | Performed by: HOSPITALIST

## 2025-05-15 PROCEDURE — 94760 N-INVAS EAR/PLS OXIMETRY 1: CPT

## 2025-05-15 PROCEDURE — A9270 NON-COVERED ITEM OR SERVICE: HCPCS

## 2025-05-15 PROCEDURE — 700101 HCHG RX REV CODE 250

## 2025-05-15 PROCEDURE — 94640 AIRWAY INHALATION TREATMENT: CPT

## 2025-05-15 PROCEDURE — 700102 HCHG RX REV CODE 250 W/ 637 OVERRIDE(OP)

## 2025-05-15 PROCEDURE — 85027 COMPLETE CBC AUTOMATED: CPT

## 2025-05-15 PROCEDURE — 99233 SBSQ HOSP IP/OBS HIGH 50: CPT | Performed by: HOSPITALIST

## 2025-05-15 PROCEDURE — 71045 X-RAY EXAM CHEST 1 VIEW: CPT

## 2025-05-15 PROCEDURE — A9270 NON-COVERED ITEM OR SERVICE: HCPCS | Performed by: PHYSICIAN ASSISTANT

## 2025-05-15 PROCEDURE — 83735 ASSAY OF MAGNESIUM: CPT

## 2025-05-15 RX ORDER — SPIRONOLACTONE 25 MG/1
50 TABLET ORAL
Status: DISCONTINUED | OUTPATIENT
Start: 2025-05-16 | End: 2025-05-17 | Stop reason: HOSPADM

## 2025-05-15 RX ORDER — SPIRONOLACTONE 25 MG/1
25 TABLET ORAL ONCE
Status: COMPLETED | OUTPATIENT
Start: 2025-05-15 | End: 2025-05-15

## 2025-05-15 RX ORDER — ALBUTEROL SULFATE 90 UG/1
2 INHALANT RESPIRATORY (INHALATION)
Status: DISCONTINUED | OUTPATIENT
Start: 2025-05-15 | End: 2025-05-17 | Stop reason: HOSPADM

## 2025-05-15 RX ORDER — FUROSEMIDE 10 MG/ML
20 INJECTION INTRAMUSCULAR; INTRAVENOUS 2 TIMES DAILY
Status: DISCONTINUED | OUTPATIENT
Start: 2025-05-15 | End: 2025-05-17 | Stop reason: HOSPADM

## 2025-05-15 RX ORDER — CARVEDILOL 3.12 MG/1
3.12 TABLET ORAL 2 TIMES DAILY WITH MEALS
Status: DISCONTINUED | OUTPATIENT
Start: 2025-05-15 | End: 2025-05-17 | Stop reason: HOSPADM

## 2025-05-15 RX ORDER — POTASSIUM CHLORIDE 1500 MG/1
20 TABLET, EXTENDED RELEASE ORAL ONCE
Status: COMPLETED | OUTPATIENT
Start: 2025-05-15 | End: 2025-05-15

## 2025-05-15 RX ORDER — ALBUTEROL SULFATE 90 UG/1
2 INHALANT RESPIRATORY (INHALATION)
Status: DISCONTINUED | OUTPATIENT
Start: 2025-05-15 | End: 2025-05-15

## 2025-05-15 RX ORDER — MAGNESIUM SULFATE HEPTAHYDRATE 40 MG/ML
4 INJECTION, SOLUTION INTRAVENOUS ONCE
Status: COMPLETED | OUTPATIENT
Start: 2025-05-15 | End: 2025-05-15

## 2025-05-15 RX ADMIN — BENZONATATE 100 MG: 100 CAPSULE ORAL at 12:46

## 2025-05-15 RX ADMIN — GUAIFENESIN 600 MG: 600 TABLET, EXTENDED RELEASE ORAL at 06:22

## 2025-05-15 RX ADMIN — FUROSEMIDE 20 MG: 10 INJECTION, SOLUTION INTRAVENOUS at 17:29

## 2025-05-15 RX ADMIN — GUAIFENESIN 600 MG: 600 TABLET, EXTENDED RELEASE ORAL at 16:27

## 2025-05-15 RX ADMIN — CARVEDILOL 3.12 MG: 3.12 TABLET, FILM COATED ORAL at 17:30

## 2025-05-15 RX ADMIN — SPIRONOLACTONE 25 MG: 25 TABLET ORAL at 12:45

## 2025-05-15 RX ADMIN — LACTULOSE 30 ML: 10 SOLUTION ORAL at 12:46

## 2025-05-15 RX ADMIN — FUROSEMIDE 20 MG: 10 INJECTION, SOLUTION INTRAVENOUS at 06:22

## 2025-05-15 RX ADMIN — POTASSIUM CHLORIDE 20 MEQ: 1500 TABLET, EXTENDED RELEASE ORAL at 12:46

## 2025-05-15 RX ADMIN — AMOXICILLIN AND CLAVULANATE POTASSIUM 1 TABLET: 875; 125 TABLET, FILM COATED ORAL at 06:21

## 2025-05-15 RX ADMIN — MAGNESIUM SULFATE HEPTAHYDRATE 4 G: 4 INJECTION, SOLUTION INTRAVENOUS at 08:50

## 2025-05-15 RX ADMIN — MOMETASONE FUROATE AND FORMOTEROL FUMARATE DIHYDRATE 2 PUFF: 100; 5 AEROSOL RESPIRATORY (INHALATION) at 06:21

## 2025-05-15 RX ADMIN — BENZONATATE 100 MG: 100 CAPSULE ORAL at 06:21

## 2025-05-15 RX ADMIN — BENZONATATE 100 MG: 100 CAPSULE ORAL at 16:27

## 2025-05-15 RX ADMIN — ALBUTEROL SULFATE 2 PUFF: 90 AEROSOL, METERED RESPIRATORY (INHALATION) at 10:30

## 2025-05-15 RX ADMIN — IPRATROPIUM BROMIDE AND ALBUTEROL SULFATE 3 ML: .5; 2.5 SOLUTION RESPIRATORY (INHALATION) at 13:13

## 2025-05-15 RX ADMIN — AMOXICILLIN AND CLAVULANATE POTASSIUM 1 TABLET: 875; 125 TABLET, FILM COATED ORAL at 16:27

## 2025-05-15 RX ADMIN — SPIRONOLACTONE 25 MG: 25 TABLET ORAL at 06:21

## 2025-05-15 RX ADMIN — LACTULOSE 30 ML: 10 SOLUTION ORAL at 16:27

## 2025-05-15 RX ADMIN — LACTULOSE 30 ML: 10 SOLUTION ORAL at 06:21

## 2025-05-15 RX ADMIN — ALBUTEROL SULFATE 2 PUFF: 90 AEROSOL, METERED RESPIRATORY (INHALATION) at 02:26

## 2025-05-15 RX ADMIN — MOMETASONE FUROATE AND FORMOTEROL FUMARATE DIHYDRATE 2 PUFF: 100; 5 AEROSOL RESPIRATORY (INHALATION) at 16:27

## 2025-05-15 ASSESSMENT — ENCOUNTER SYMPTOMS
HEARTBURN: 0
HEADACHES: 0
DIZZINESS: 0
PALPITATIONS: 0
DIARRHEA: 0
COUGH: 0
CHILLS: 0
VOMITING: 0
ABDOMINAL PAIN: 0
NAUSEA: 0
SHORTNESS OF BREATH: 1
FEVER: 0

## 2025-05-15 ASSESSMENT — FIBROSIS 4 INDEX: FIB4 SCORE: 7.63

## 2025-05-15 NOTE — PROGRESS NOTES
Bedside report received from off going RN/tech: Breanne, assumed care of patient.     Fall Risk Score: MODERATE RISK  Fall risk interventions in place: Up Self  Bed type: Regular (Goyo Score less than 17 interventions in place)  Patient on cardiac monitor: Yes  IVF/IV medications: Not Applicable   Oxygen: Room Air  Bedside sitter: Not Applicable   Isolation: Not applicable

## 2025-05-15 NOTE — ASSESSMENT & PLAN NOTE
Continue monitoring  Continue holding Lovenox for DVT prophylaxis patient had mild hematuria today.

## 2025-05-15 NOTE — PROGRESS NOTES
Hospital Medicine Daily Progress Note    Date of Service  5/14/2025    Chief Complaint  Windy Saldivar is a 44 y.o. female admitted 5/11/2025 with shortness of breath.    Hospital Course  Patient is a 44-year-old female with past medical history of alcohol use complicated by decompensated cirrhosis follows with hepatology at [facility] however appears lost to follow-up, medication noncompliance for the past month who presents due to shortness of breath, cough, fever, chills, and general fatigue has been progressively worsening over the past couple of weeks.  Patient states she was driving, pulled over to the side the road as she felt tired and sick.  Ultimately, authorities checked on the patient and stated she needed to go home versus going to the hospital and therefore presented here for evaluation.  Patient was found to have a pulse of 115, respiratory rate of 24, requiring 2 L nasal cannula for 92% saturation, WBC of 11.4 and lactic acid of 6.1.  Chest x-ray demonstrated bilateral airspace opacities concerning for multifocal pneumonia.  Given the above, patient admitted for likely sepsis secondary to pneumonia, 1.5 L of ascitic fluid was drained from the abdomen without SBP findings.  Will provide patient with empiric antibiotic coverage, as well as close monitoring.     Interval Problem Update  5/11 afebrile, slightly tachycardic and on 2 L nasal cannula.  Received paracentesis in the ED at which time they drained 1.5 L.  Trend lactic acid.  Continue lactulose. Check labs in AM.    5/12 afebrile, tachycardia improving on 1.5 L nasal cannula  WBC 7.7, hemoglobin 8.9, platelets 90  Potassium 3.1, replaced  Magnesium 1.7, IV replacement  last lactic 1.7  Extended respiratory viral panel negative  1/2 blood cultures with gram-positive cocci in clusters, pending speciation  Paracentesis culture negative to date  Telemetry reviewed patient in sinus tachycardia overnight  Patient very fatigued.  Having  significant pleuritic chest pain with coughing.  Also having diffuse abdominal pain, denies nausea.      5/13 patient is in bed, she is alert oriented follows commands, she is still coughing and slightly short of breath, trying to wean her off oxygen, cultures probably contamination, discussed with bedside nurse charge nurse  pharmacist, continue close monitoring, hopefully DC home in a day or 2 if able to wean off oxygen and cultures remain negative,  5/14:  Patient is in bed, she is short of breath she is complaining of cough, abdominal distention, patient had paracentesis today, I ordered and reviewed chest x-ray showing small to moderate size right-sided pleural effusion started on IV Lasix spironolactone will repeat chest x-ray in a.m., probably related to liver cirrhosis with ascites, discussed with bedside nurse charge nurse  pharmacist    I have discussed this patient's plan of care and discharge plan at IDT rounds today with Case Management, Nursing, Nursing leadership, and other members of the IDT team.    Consultants/Specialty  none    Code Status  Full Code    Disposition  The patient is not medically cleared for discharge to home or a post-acute facility.      I have placed the appropriate orders for post-discharge needs.    Review of Systems  Review of Systems   Constitutional:  Negative for chills, fever and malaise/fatigue.   Respiratory:  Positive for shortness of breath. Negative for cough.    Cardiovascular:  Negative for chest pain, palpitations and leg swelling.   Gastrointestinal:  Negative for abdominal pain, diarrhea, heartburn, nausea and vomiting.   Genitourinary:  Negative for dysuria, frequency and urgency.   Neurological:  Negative for dizziness and headaches.   All other systems reviewed and are negative.       Physical Exam  Temp:  [36.3 °C (97.3 °F)-36.7 °C (98 °F)] 36.7 °C (98 °F)  Pulse:  [] 102  Resp:  [16-20] 16  BP: (124-137)/(79-83) 137/83  SpO2:   [89 %-100 %] 95 %    Physical Exam  Vitals and nursing note reviewed.   Constitutional:       Appearance: Normal appearance. She is not ill-appearing.   HENT:      Head: Normocephalic and atraumatic.      Jaw: There is normal jaw occlusion.      Right Ear: Hearing normal.      Left Ear: Hearing normal.      Nose: Nose normal.      Mouth/Throat:      Lips: Pink.      Mouth: Mucous membranes are moist.   Eyes:      General: No scleral icterus.  Neck:      Vascular: No carotid bruit.   Cardiovascular:      Rate and Rhythm: Normal rate and regular rhythm.      Pulses: Normal pulses.      Heart sounds: Normal heart sounds, S1 normal and S2 normal.   Pulmonary:      Effort: Pulmonary effort is normal.      Breath sounds: Normal breath sounds and air entry. No stridor.   Abdominal:      General: There is distension.   Musculoskeletal:      Cervical back: Normal range of motion and neck supple.      Right lower leg: No edema.      Left lower leg: No edema.   Skin:     General: Skin is warm and dry.      Capillary Refill: Capillary refill takes less than 2 seconds.   Neurological:      General: No focal deficit present.      Mental Status: She is alert and oriented to person, place, and time. Mental status is at baseline.      Sensory: Sensation is intact.      Motor: Motor function is intact.   Psychiatric:         Attention and Perception: Attention and perception normal.         Mood and Affect: Mood and affect normal.         Speech: Speech normal.         Behavior: Behavior normal. Behavior is cooperative.         Fluids    Intake/Output Summary (Last 24 hours) at 5/14/2025 1705  Last data filed at 5/14/2025 1511  Gross per 24 hour   Intake 1020 ml   Output 800 ml   Net 220 ml        Laboratory  Recent Labs     05/12/25  0056 05/13/25  0129 05/14/25  0450   WBC 7.7 7.3 7.0   RBC 2.83* 2.80* 2.83*   HEMOGLOBIN 8.9* 8.7* 8.8*   HEMATOCRIT 28.0* 27.4* 28.7*   MCV 98.9* 97.9* 101.4*   MCH 31.4 31.1 31.1   MCHC 31.8* 31.8*  30.7*   RDW 65.6* 64.3* 66.5*   PLATELETCT 90* 97* 94*   MPV 9.6 9.8 9.4     Recent Labs     05/12/25  0056 05/13/25  0129 05/14/25  0450   SODIUM 137 136 134*   POTASSIUM 3.1* 3.9 3.6   CHLORIDE 106 106 104   CO2 20 22 23   GLUCOSE 121* 119* 114*   BUN 5* 5* 6*   CREATININE 0.37* 0.29* 0.33*   CALCIUM 8.1* 8.2* 8.0*                     Imaging  US-PARACENTESIS, ABD WITH IMAGING   Final Result      1. Ultrasound-guided diagnostic and therapeutic paracentesis of the right lower quadrant of the abdominal wall.      2. 3000 mL of fluid withdrawn.      DX-CHEST-LIMITED (1 VIEW)   Final Result      1.  Increased small to moderate right-sided pleural effusion with associated compressive atelectasis and/or consolidation.   2.  Improving patchy bilateral interstitial opacities.      DX-CHEST-PORTABLE (1 VIEW)   Final Result      Airspace opacities throughout both lungs, left more than right, concerning for pneumonia.           Assessment/Plan  * Septic shock (HCC)- (present on admission)  Assessment & Plan  This is Sepsis Present on admission  SIRS criteria identified on my evaluation include: Tachycardia, with heart rate greater than 90 BPM and Tachypnea, with respirations greater than 20 per minute  Clinical indicators of end organ dysfunction include persistent lactic acidosis greater than 2  Source is likely pulmonary  Sepsis protocol initiated  Crystalloid Fluid Administration: Fluid resuscitation ordered per standard protocol - 30 mL/kg per current or ideal body weight  IV antibiotics as appropriate for source of sepsis  Reassessment: I have reassessed the patient's hemodynamic status    Lactic 6 on admission, repeat 5.9.  After multiple repeats now within normal limits  Paracentesis of 1.5 L drained in the emergency department, ascitic fluid does not appear to be SBP  Follow blood cultures, sputum cultures  - COVID/flu/RSV unremarkable  - Empiric Unasyn and azithromycin    Hyponatremia  Assessment &  Plan  Monitoring  Follow-up BMP in a.m.    Positive blood culture- (present on admission)  Assessment & Plan  1/2 blood culture positive for gram-positive cocci in clusters, pending speciation  -Possible contaminant?  Repeat blood cultures 5/13      History of medication noncompliance  Assessment & Plan  Patient states has been noncompliant with her home medications for the past month    Acute hypoxic respiratory failure (HCC)  Assessment & Plan  .  Patient with acute approximately failure requiring 2 L nasal cannula, baseline room air with chest x-ray presenting multifocal opacities concerning for atypical pneumonia  - Will provide empiric Unasyn and azithromycin    Continue monitoring  Continue breathing treatments    High anion gap metabolic acidosis  Assessment & Plan  Bicarb 17, anion gap 18, lactic acid of 6.0, likely secondary to sepsis with lactic acidosis  - Fluid resuscitation, caution given patient's decompensated cirrhosis    Gross hematuria  Assessment & Plan  Moderate blood noted on urinalysis with few RBCs  - Ordered CPK  - Likely contaminant from menstrual cycle which started today and PureWick    Resolved    Thrombocytopenia (HCC)- (present on admission)  Assessment & Plan  Continue monitoring  Continue holding Lovenox for DVT prophylaxis patient had mild hematuria today.    Hyperbilirubinemia- (present on admission)  Assessment & Plan  T. bili of 11.9, appears to be downtrending likely secondary to patient's decompensated cirrhosis    Ovarian mass- (present on admission)  Assessment & Plan  History of ovarian mass with elevated , lost to follow up.    Please follow-up as outpatient    Alcoholic cirrhosis (HCC)- (present on admission)  Assessment & Plan  History of decompensated alcoholic cirrhosis followed by [facility] hepatology  - Previously on lactulose and rifaximin, however patient has not been on oral medication for a month  - Will restart lactulose and titrate bowel movement to 3/day  -  Was previously on carvedilol as well, however will hold as patient did not comply with medications in the setting of acute sepsis with tachycardia      Continue monitoring  Paracentesis today fluid analysis did not show signs of SBP        Pleural effusion- (present on admission)  Assessment & Plan  Right sided  Started on iv lasix  Started on spironolactone  S/p paracentesis it should improve pleural effusion   F/u cxr in am.          VTE prophylaxis: SCDs  Lovenox ppx on hold      I reviewed CBC  I reviewed BMP  I ordered chest x-ray  Start the patient on IV Lasix monitor for side effects related to hypotension electrolyte normalities  Started on spironolactone  Discussed with clinical pharmacist  Ordered paracentesis  Fluid analysis reviewed and interpreted.      My total time spent caring for the patient on the day of the encounter was 52 minutes.   This does not include time spent on separately billable procedures/tests.

## 2025-05-15 NOTE — PROGRESS NOTES
Bedside report received from off going RN/tech: Zoran, assumed care of patient.     Fall Risk Score: MODERATE RISK  Fall risk interventions in place: Place yellow fall risk ID band on patient, Provide patient/family education based on risk assessment, Educate patient/family to call staff for assistance when getting out of bed, Place fall precaution signage outside patient door, and Utilize bed/chair fall alarm  Bed type: Regular (Goyo Score less than 17 interventions in place)  Patient on cardiac monitor: Yes  IVF/IV medications: Not Applicable   Oxygen: Room Air  Bedside sitter: Not Applicable   Isolation: Not applicable

## 2025-05-15 NOTE — CARE PLAN
The patient is Watcher - Medium risk of patient condition declining or worsening    Shift Goals  Clinical Goals: diurese, hemodynamic stability, safety  Patient Goals: rest, pain control  Family Goals: cristian    Progress made toward(s) clinical / shift goals:  Pt calls appropriately to ambulate to BR, VSS, receiving scheduled PO Lasix    Problem: Knowledge Deficit - Standard  Goal: Patient and family/care givers will demonstrate understanding of plan of care, disease process/condition, diagnostic tests and medications  Outcome: Progressing     Problem: Fluid Volume  Goal: Fluid volume balance will be maintained  Outcome: Progressing

## 2025-05-15 NOTE — ASSESSMENT & PLAN NOTE
Right sided  Started on iv lasix  Started on spironolactone  S/p paracentesis it should improve pleural effusion   F/u cxr in am.   Improved

## 2025-05-16 ENCOUNTER — APPOINTMENT (OUTPATIENT)
Dept: RADIOLOGY | Facility: MEDICAL CENTER | Age: 45
DRG: 871 | End: 2025-05-16
Attending: FAMILY MEDICINE
Payer: COMMERCIAL

## 2025-05-16 LAB
ALBUMIN SERPL BCP-MCNC: 2.9 G/DL (ref 3.2–4.9)
ALBUMIN/GLOB SERPL: 0.8 G/DL
ALP SERPL-CCNC: 155 U/L (ref 30–99)
ALT SERPL-CCNC: 41 U/L (ref 2–50)
AMMONIA PLAS-SCNC: 49 UMOL/L (ref 11–45)
ANION GAP SERPL CALC-SCNC: 6 MMOL/L (ref 7–16)
AST SERPL-CCNC: 102 U/L (ref 12–45)
BACTERIA BLD CULT: NORMAL
BILIRUB SERPL-MCNC: 10.1 MG/DL (ref 0.1–1.5)
BUN SERPL-MCNC: 7 MG/DL (ref 8–22)
CALCIUM ALBUM COR SERPL-MCNC: 9.1 MG/DL (ref 8.5–10.5)
CALCIUM SERPL-MCNC: 8.2 MG/DL (ref 8.5–10.5)
CHLORIDE SERPL-SCNC: 102 MMOL/L (ref 96–112)
CO2 SERPL-SCNC: 26 MMOL/L (ref 20–33)
CREAT SERPL-MCNC: 0.41 MG/DL (ref 0.5–1.4)
ERYTHROCYTE [DISTWIDTH] IN BLOOD BY AUTOMATED COUNT: 61.8 FL (ref 35.9–50)
GFR SERPLBLD CREATININE-BSD FMLA CKD-EPI: 123 ML/MIN/1.73 M 2
GLOBULIN SER CALC-MCNC: 3.8 G/DL (ref 1.9–3.5)
GLUCOSE SERPL-MCNC: 106 MG/DL (ref 65–99)
HCT VFR BLD AUTO: 32.5 % (ref 37–47)
HGB BLD-MCNC: 10.7 G/DL (ref 12–16)
MAGNESIUM SERPL-MCNC: 1.9 MG/DL (ref 1.5–2.5)
MCH RBC QN AUTO: 32 PG (ref 27–33)
MCHC RBC AUTO-ENTMCNC: 32.9 G/DL (ref 32.2–35.5)
MCV RBC AUTO: 97.3 FL (ref 81.4–97.8)
PLATELET # BLD AUTO: 105 K/UL (ref 164–446)
PLATELETS.RETICULATED NFR BLD AUTO: 2.3 % (ref 0.6–13.1)
PMV BLD AUTO: 9.3 FL (ref 9–12.9)
POTASSIUM SERPL-SCNC: 3.2 MMOL/L (ref 3.6–5.5)
PROT SERPL-MCNC: 6.7 G/DL (ref 6–8.2)
RBC # BLD AUTO: 3.34 M/UL (ref 4.2–5.4)
SIGNIFICANT IND 70042: NORMAL
SITE SITE: NORMAL
SODIUM SERPL-SCNC: 134 MMOL/L (ref 135–145)
SOURCE SOURCE: NORMAL
WBC # BLD AUTO: 8.2 K/UL (ref 4.8–10.8)

## 2025-05-16 PROCEDURE — 700102 HCHG RX REV CODE 250 W/ 637 OVERRIDE(OP): Performed by: PHYSICIAN ASSISTANT

## 2025-05-16 PROCEDURE — 83735 ASSAY OF MAGNESIUM: CPT

## 2025-05-16 PROCEDURE — 700111 HCHG RX REV CODE 636 W/ 250 OVERRIDE (IP): Performed by: HOSPITALIST

## 2025-05-16 PROCEDURE — 700102 HCHG RX REV CODE 250 W/ 637 OVERRIDE(OP)

## 2025-05-16 PROCEDURE — 99232 SBSQ HOSP IP/OBS MODERATE 35: CPT | Performed by: FAMILY MEDICINE

## 2025-05-16 PROCEDURE — 49083 ABD PARACENTESIS W/IMAGING: CPT

## 2025-05-16 PROCEDURE — 85055 RETICULATED PLATELET ASSAY: CPT

## 2025-05-16 PROCEDURE — 80053 COMPREHEN METABOLIC PANEL: CPT

## 2025-05-16 PROCEDURE — 85027 COMPLETE CBC AUTOMATED: CPT

## 2025-05-16 PROCEDURE — 82140 ASSAY OF AMMONIA: CPT

## 2025-05-16 PROCEDURE — A9270 NON-COVERED ITEM OR SERVICE: HCPCS | Performed by: HOSPITALIST

## 2025-05-16 PROCEDURE — 700102 HCHG RX REV CODE 250 W/ 637 OVERRIDE(OP): Performed by: FAMILY MEDICINE

## 2025-05-16 PROCEDURE — 770006 HCHG ROOM/CARE - MED/SURG/GYN SEMI*

## 2025-05-16 PROCEDURE — 0W9G3ZZ DRAINAGE OF PERITONEAL CAVITY, PERCUTANEOUS APPROACH: ICD-10-PCS

## 2025-05-16 PROCEDURE — 700102 HCHG RX REV CODE 250 W/ 637 OVERRIDE(OP): Performed by: HOSPITALIST

## 2025-05-16 PROCEDURE — A9270 NON-COVERED ITEM OR SERVICE: HCPCS | Performed by: FAMILY MEDICINE

## 2025-05-16 PROCEDURE — A9270 NON-COVERED ITEM OR SERVICE: HCPCS

## 2025-05-16 PROCEDURE — A9270 NON-COVERED ITEM OR SERVICE: HCPCS | Performed by: PHYSICIAN ASSISTANT

## 2025-05-16 RX ORDER — POTASSIUM CHLORIDE 1500 MG/1
20 TABLET, EXTENDED RELEASE ORAL 2 TIMES DAILY
Status: DISCONTINUED | OUTPATIENT
Start: 2025-05-16 | End: 2025-05-17 | Stop reason: HOSPADM

## 2025-05-16 RX ADMIN — MOMETASONE FUROATE AND FORMOTEROL FUMARATE DIHYDRATE 2 PUFF: 100; 5 AEROSOL RESPIRATORY (INHALATION) at 16:42

## 2025-05-16 RX ADMIN — LACTULOSE 30 ML: 10 SOLUTION ORAL at 16:42

## 2025-05-16 RX ADMIN — AMOXICILLIN AND CLAVULANATE POTASSIUM 1 TABLET: 875; 125 TABLET, FILM COATED ORAL at 05:00

## 2025-05-16 RX ADMIN — POTASSIUM CHLORIDE 20 MEQ: 1500 TABLET, EXTENDED RELEASE ORAL at 18:09

## 2025-05-16 RX ADMIN — LACTULOSE 30 ML: 10 SOLUTION ORAL at 05:01

## 2025-05-16 RX ADMIN — GUAIFENESIN 600 MG: 600 TABLET, EXTENDED RELEASE ORAL at 05:00

## 2025-05-16 RX ADMIN — MOMETASONE FUROATE AND FORMOTEROL FUMARATE DIHYDRATE 2 PUFF: 100; 5 AEROSOL RESPIRATORY (INHALATION) at 06:06

## 2025-05-16 RX ADMIN — FUROSEMIDE 20 MG: 10 INJECTION, SOLUTION INTRAVENOUS at 05:09

## 2025-05-16 RX ADMIN — BENZONATATE 100 MG: 100 CAPSULE ORAL at 12:10

## 2025-05-16 RX ADMIN — ALBUTEROL SULFATE 2 PUFF: 90 AEROSOL, METERED RESPIRATORY (INHALATION) at 04:56

## 2025-05-16 RX ADMIN — OXYCODONE 5 MG: 5 TABLET ORAL at 04:59

## 2025-05-16 RX ADMIN — LACTULOSE 30 ML: 10 SOLUTION ORAL at 12:10

## 2025-05-16 RX ADMIN — BENZONATATE 100 MG: 100 CAPSULE ORAL at 05:01

## 2025-05-16 RX ADMIN — GUAIFENESIN 600 MG: 600 TABLET, EXTENDED RELEASE ORAL at 16:42

## 2025-05-16 RX ADMIN — OXYCODONE 5 MG: 5 TABLET ORAL at 14:59

## 2025-05-16 RX ADMIN — OXYCODONE 5 MG: 5 TABLET ORAL at 21:11

## 2025-05-16 RX ADMIN — BENZONATATE 100 MG: 100 CAPSULE ORAL at 16:42

## 2025-05-16 RX ADMIN — SPIRONOLACTONE 50 MG: 25 TABLET ORAL at 06:07

## 2025-05-16 ASSESSMENT — PAIN DESCRIPTION - PAIN TYPE
TYPE: ACUTE PAIN

## 2025-05-16 NOTE — PROGRESS NOTES
The patient is leaving the unit for paracentesis with transport.     1500: The patient arrived to the unit from paracentesis. Resting in bed. Medicated pain per MAR.

## 2025-05-16 NOTE — CARE PLAN
The patient is Stable - Low risk of patient condition declining or worsening    Shift Goals  Clinical Goals: Diurese/I&O, VSS, safet/comfort  Patient Goals: sleep  Family Goals: MICKEY - no family present    Progress made toward(s) clinical / shift goals:      Problem: Pain - Standard  Goal: Alleviation of pain or a reduction in pain to the patient’s comfort goal  Description: Target End Date:  Prior to discharge or change in level of careDocument on Vitals flowsheet1.  Document pain using the appropriate pain scale per order or unit policy2.  Educate and implement non-pharmacologic comfort measures (i.e. relaxation, distraction, massage, cold/heat therapy, etc.)3.  Pain management medications as ordered4.  Reassess pain after pain med administration per policy5.  If opiods administered assess patient's response to pain medication is appropriate per POSS sedation scale6.  Follow pain management plan developed in collaboration with patient and interdisciplinary team (including palliative care or pain specialists if applicable)  Outcome: Progressing  Note: Patient denies pain.     Problem: Knowledge Deficit - Standard  Goal: Patient and family/care givers will demonstrate understanding of plan of care, disease process/condition, diagnostic tests and medications  Description: Target End Date:  1-3 days or as soon as patient condition allowsDocument in Patient Education1.  Patient and family/caregiver oriented to unit, equipment, visitation policy and means for communicating concern2.  Complete/review Learning Assessment3.  Assess knowledge level of disease process/condition, treatment plan, diagnostic tests and medications4.  Explain disease process/condition, treatment plan, diagnostic tests and medications  5/16/2025 0127 by Christin Mujica R.N.  Outcome: Progressing  Note: Discussed POC: labs, medications, and treatments with patient who demonstrates understanding w/all questions answered.         Problem: Fluid  Volume  Goal: Fluid volume balance will be maintained  Description: Target End Date:  Prior to discharge or change in level of careDocument on I/O flowsheet1.  Monitor intake and output as ordered2.  Promote oral intake as appropriate3.  Report inadequate intake or output to physician4.  Administer IV therapy as ordered5.  Weights per provider order6.  Assess for signs and symptoms of bleeding7.  Monitor for signs of fluid overload (respiratory changes, edema, weight gain, increased abdominal girth)8.  Monitor of signs for inadequate fluid volume (poor skin turgor, dry mucous membranes)9.  Instruct patient on adherence to fluid restrictions  5/16/2025 0127 by Christin Mujica R.JANESSA.  Outcome: Progressing     Problem: Respiratory  Goal: Patient will achieve/maintain optimum respiratory ventilation and gas exchange  Description: Target End Date:  Prior to discharge or change in level of careDocument on Assessment flowsheet1.  Assess and monitor rate, rhythm, depth and effort of respiration2.  Breath sounds assessed qshift and/or as needed3.  Assess O2 saturation, administer/titrate oxygen as ordered4.  Position patient for maximum ventilatory efficiency5.  Turn, cough, and deep breath with splinting to improve effectiveness6.  Collaborate with RT to administer medication/treatments per order7.  Encourage use of incentive spirometer and encourage patient to cough after use and utilize splinting techniques if applicable8.  Airway suctioning9.  Monitor sputum production for changes in color, consistency and [id number]. Perform frequent oral [id number]. Alternate physical activity with rest periods  5/16/2025 0127 by Christin Mujica R.N.  Outcome: Progressing  Note: Patient is on room air with no signs of respiratory distress.      Problem: Fall Risk  Goal: Patient will remain free from falls  Description: Target End Date:  Prior to discharge or change in level of careDocument interventions on the [facility] Fall Risk  Assessment1.  Assess for fall risk factors2.  Implement fall precautions  5/16/2025 0127 by Christin Mujica R.N.  Outcome: Progressing  Note: Assessed fall risk and no bed alarm is required for patient; independent in the room.      Problem: Skin Integrity  Goal: Skin integrity is maintained or improved  Description: Target End Date:  Prior to discharge or change in level of careDocument interventions on Skin Risk/Goyo flowsheet groups and corresponding LDA1.  Assess and monitor skin integrity, appearance and/or temperature2.  Assess risk factors for impaired skin integrity and/or pressures ulcers3.  Implement precautions to protect skin integrity in collaboration with interdisciplinary team4.  Implement pressure ulcer prevention protocol if at risk for skin breakdown5.  Confirm wound care consult if at risk for skin breakdown6.  Ensure patient use of pressure relieving devices  (Low air loss bed, waffle overlay, heel protectors, ROHO cushion, etc)  5/16/2025 0127 by Christin Mujica R.N.  Outcome: Progressing  Note: Patients skin is intact, warm, and dry; scattered bruising, jaundice, and bi-lateral lower extremity edema present.        Patient is not progressing towards the following goals:

## 2025-05-16 NOTE — PROGRESS NOTES
Hospital Medicine Daily Progress Note    Date of Service  5/15/2025    Chief Complaint  Windy Saldivar is a 44 y.o. female admitted 5/11/2025 with shortness of breath.    Hospital Course  Patient is a 44-year-old female with past medical history of alcohol use complicated by decompensated cirrhosis follows with hepatology at [facility] however appears lost to follow-up, medication noncompliance for the past month who presents due to shortness of breath, cough, fever, chills, and general fatigue has been progressively worsening over the past couple of weeks.  Patient states she was driving, pulled over to the side the road as she felt tired and sick.  Ultimately, authorities checked on the patient and stated she needed to go home versus going to the hospital and therefore presented here for evaluation.  Patient was found to have a pulse of 115, respiratory rate of 24, requiring 2 L nasal cannula for 92% saturation, WBC of 11.4 and lactic acid of 6.1.  Chest x-ray demonstrated bilateral airspace opacities concerning for multifocal pneumonia.  Given the above, patient admitted for likely sepsis secondary to pneumonia, 1.5 L of ascitic fluid was drained from the abdomen without SBP findings.  Will provide patient with empiric antibiotic coverage, as well as close monitoring.     Interval Problem Update  5/11 afebrile, slightly tachycardic and on 2 L nasal cannula.  Received paracentesis in the ED at which time they drained 1.5 L.  Trend lactic acid.  Continue lactulose. Check labs in AM.    5/12 afebrile, tachycardia improving on 1.5 L nasal cannula  WBC 7.7, hemoglobin 8.9, platelets 90  Potassium 3.1, replaced  Magnesium 1.7, IV replacement  last lactic 1.7  Extended respiratory viral panel negative  1/2 blood cultures with gram-positive cocci in clusters, pending speciation  Paracentesis culture negative to date  Telemetry reviewed patient in sinus tachycardia overnight  Patient very fatigued.  Having  significant pleuritic chest pain with coughing.  Also having diffuse abdominal pain, denies nausea.      5/13 patient is in bed, she is alert oriented follows commands, she is still coughing and slightly short of breath, trying to wean her off oxygen, cultures probably contamination, discussed with bedside nurse charge nurse  pharmacist, continue close monitoring, hopefully DC home in a day or 2 if able to wean off oxygen and cultures remain negative,  5/14:  Patient is in bed, she is short of breath she is complaining of cough, abdominal distention, patient had paracentesis today, I ordered and reviewed chest x-ray showing small to moderate size right-sided pleural effusion started on IV Lasix spironolactone will repeat chest x-ray in a.m., probably related to liver cirrhosis with ascites, discussed with bedside nurse charge nurse  pharmacist  5/15 patient is in bed, she is still short of breath but appears to be more comfortable, continue having cough, continue to have lower extremity edema, chest x-ray reviewed which showed improved in pulmonary edema and pleural effusion, increase Lasix is to 20 mg twice daily, increase spironolactone to 50 mg daily, patient has history of esophageal varices started on low-dose Coreg with holding parameters, check ammonia level in a.m., discussed with bedside nurse charge nurse  pharmacist.    I have discussed this patient's plan of care and discharge plan at IDT rounds today with Case Management, Nursing, Nursing leadership, and other members of the IDT team.    Consultants/Specialty  none    Code Status  Full Code    Disposition  The patient is not medically cleared for discharge to home or a post-acute facility.      I have placed the appropriate orders for post-discharge needs.    Review of Systems  Review of Systems   Constitutional:  Negative for chills, fever and malaise/fatigue.   Respiratory:  Positive for shortness of breath.  Negative for cough.    Cardiovascular:  Negative for chest pain, palpitations and leg swelling.   Gastrointestinal:  Negative for abdominal pain, diarrhea, heartburn, nausea and vomiting.   Genitourinary:  Negative for dysuria, frequency and urgency.   Neurological:  Negative for dizziness and headaches.   All other systems reviewed and are negative.       Physical Exam  Temp:  [36.4 °C (97.6 °F)-36.8 °C (98.2 °F)] 36.8 °C (98.2 °F)  Pulse:  [] 111  Resp:  [16-20] 20  BP: (116-135)/(77-84) 123/77  SpO2:  [92 %-99 %] 96 %    Physical Exam  Vitals and nursing note reviewed.   Constitutional:       Appearance: She is not ill-appearing.   HENT:      Head: Normocephalic and atraumatic.      Jaw: There is normal jaw occlusion.      Right Ear: Hearing normal.      Left Ear: Hearing normal.      Nose: Nose normal.      Mouth/Throat:      Lips: Pink.      Mouth: Mucous membranes are moist.   Eyes:      General: Scleral icterus present.   Neck:      Vascular: No carotid bruit.   Cardiovascular:      Rate and Rhythm: Normal rate and regular rhythm.      Pulses: Normal pulses.      Heart sounds: Normal heart sounds, S1 normal and S2 normal.   Pulmonary:      Effort: Pulmonary effort is normal.      Breath sounds: Normal breath sounds and air entry. No stridor.   Abdominal:      General: There is distension.   Musculoskeletal:      Cervical back: Normal range of motion and neck supple.      Right lower leg: No edema.      Left lower leg: No edema.   Skin:     General: Skin is warm and dry.      Capillary Refill: Capillary refill takes less than 2 seconds.   Neurological:      General: No focal deficit present.      Mental Status: She is alert and oriented to person, place, and time. Mental status is at baseline.      Sensory: Sensation is intact.      Motor: Motor function is intact.   Psychiatric:         Attention and Perception: Attention and perception normal.         Mood and Affect: Mood and affect normal.          Speech: Speech normal.         Behavior: Behavior normal. Behavior is cooperative.         Fluids    Intake/Output Summary (Last 24 hours) at 5/15/2025 1704  Last data filed at 5/15/2025 1315  Gross per 24 hour   Intake 100 ml   Output --   Net 100 ml        Laboratory  Recent Labs     05/13/25  0129 05/14/25  0450 05/15/25  0259   WBC 7.3 7.0 7.2   RBC 2.80* 2.83* 2.91*   HEMOGLOBIN 8.7* 8.8* 9.2*   HEMATOCRIT 27.4* 28.7* 29.1*   MCV 97.9* 101.4* 100.0*   MCH 31.1 31.1 31.6   MCHC 31.8* 30.7* 31.6*   RDW 64.3* 66.5* 67.2*   PLATELETCT 97* 94* 89*   MPV 9.8 9.4 9.6     Recent Labs     05/13/25  0129 05/14/25  0450 05/15/25  0259   SODIUM 136 134* 137   POTASSIUM 3.9 3.6 3.2*   CHLORIDE 106 104 104   CO2 22 23 22   GLUCOSE 119* 114* 131*   BUN 5* 6* 6*   CREATININE 0.29* 0.33* 0.42*   CALCIUM 8.2* 8.0* 8.0*                     Imaging  DX-CHEST-LIMITED (1 VIEW)   Final Result         1. Improving moderate right base atelectasis/infiltrate and small effusion.                     US-PARACENTESIS, ABD WITH IMAGING   Final Result      1. Ultrasound-guided diagnostic and therapeutic paracentesis of the right lower quadrant of the abdominal wall.      2. 3000 mL of fluid withdrawn.      DX-CHEST-LIMITED (1 VIEW)   Final Result      1.  Increased small to moderate right-sided pleural effusion with associated compressive atelectasis and/or consolidation.   2.  Improving patchy bilateral interstitial opacities.      DX-CHEST-PORTABLE (1 VIEW)   Final Result      Airspace opacities throughout both lungs, left more than right, concerning for pneumonia.           Assessment/Plan  * Septic shock (HCC)- (present on admission)  Assessment & Plan  This is Sepsis Present on admission  SIRS criteria identified on my evaluation include: Tachycardia, with heart rate greater than 90 BPM and Tachypnea, with respirations greater than 20 per minute  Clinical indicators of end organ dysfunction include persistent lactic acidosis greater than  2  Source is likely pulmonary  Sepsis protocol initiated  Crystalloid Fluid Administration: Fluid resuscitation ordered per standard protocol - 30 mL/kg per current or ideal body weight  IV antibiotics as appropriate for source of sepsis  Reassessment: I have reassessed the patient's hemodynamic status    Lactic 6 on admission, repeat 5.9.  After multiple repeats now within normal limits  Paracentesis of 1.5 L drained in the emergency department, ascitic fluid does not appear to be SBP  Follow blood cultures, sputum cultures  - COVID/flu/RSV unremarkable  - Empiric Unasyn and azithromycin    History of esophageal varices- (present on admission)  Assessment & Plan  Will start low-dose Coreg    Hyponatremia  Assessment & Plan  Monitoring  Follow-up BMP in a.m.    Positive blood culture- (present on admission)  Assessment & Plan  1/2 blood culture positive for gram-positive cocci in clusters, pending speciation  -Possible contaminant?  Repeat blood cultures 5/13      History of medication noncompliance  Assessment & Plan  Patient states has been noncompliant with her home medications for the past month    Acute hypoxic respiratory failure (HCC)  Assessment & Plan  .  Patient with acute approximately failure requiring 2 L nasal cannula, baseline room air with chest x-ray presenting multifocal opacities concerning for atypical pneumonia  - Will provide empiric Unasyn and azithromycin    Continue monitoring  Continue breathing treatments    High anion gap metabolic acidosis  Assessment & Plan  Bicarb 17, anion gap 18, lactic acid of 6.0, likely secondary to sepsis with lactic acidosis  - Fluid resuscitation, caution given patient's decompensated cirrhosis    Gross hematuria  Assessment & Plan  Moderate blood noted on urinalysis with few RBCs  - Ordered CPK  - Likely contaminant from menstrual cycle which started today and PureWick    Resolved    Thrombocytopenia (HCC)- (present on admission)  Assessment & Plan  Continue  monitoring  Continue holding Lovenox for DVT prophylaxis patient had mild hematuria today.    Hyperbilirubinemia- (present on admission)  Assessment & Plan  T. bili of 11.9, appears to be downtrending likely secondary to patient's decompensated cirrhosis    Ovarian mass- (present on admission)  Assessment & Plan  History of ovarian mass with elevated , lost to follow up.    Please follow-up as outpatient    Alcoholic cirrhosis (HCC)- (present on admission)  Assessment & Plan  History of decompensated alcoholic cirrhosis followed by [facility] hepatology  - Previously on lactulose and rifaximin, however patient has not been on oral medication for a month  - Will restart lactulose and titrate bowel movement to 3/day  - Was previously on carvedilol as well, however will hold as patient did not comply with medications in the setting of acute sepsis with tachycardia      Continue monitoring  Paracentesis today fluid analysis did not show signs of SBP increase Lasix to 20 mg twice daily increase spironolactone to 50 mg  Started on Coreg  Check ammonia level in a.m.            Pleural effusion- (present on admission)  Assessment & Plan  Right sided  Started on iv lasix  Started on spironolactone  S/p paracentesis it should improve pleural effusion   F/u cxr in am.   Improved         VTE prophylaxis: SCDs  Lovenox ppx on hold      Total time of 51 minutes spent prepping to see patient (e.g. reviewing  tests/imaging results, notes from consultants, bedside nurse, night shift ) obtaining and/or reviewing separately obtained history. Performing a medically appropriate examination and evaluation.  Counseling and educating the patient.  Ordering medications, tests, or procedures.  Referring and communicating with other health care professionals.  Documenting clinical information in EPIC.  Independently interpreting results and communicating results to patient.  Care coordination.        Hypomagnesemia, replacing with IV  magnesium  Follow-up in a.m.

## 2025-05-16 NOTE — PROGRESS NOTES
Bedside report received from night shift nurse. Assumed care at 0645.   Pt A&Ox4  Tolerating cardiac diet, denies n/v. Hypoactive bowel sounds, + flatus, LBM 5/16. IV access through 20G RAC.  Saturating >90% on RA.  Paracentesis site to lower abdomen. Abdomen is rounded and distended.  Pt ambulates SBA.  Pain is controlled through medication orders. Updated on plan of care. Safety education provided. Bed locked in low. Call light within reach. Rounding in place.

## 2025-05-16 NOTE — PROGRESS NOTES
Received bedside report from off-going RN and assumed patient care. Patient sitting up in bed with no signs of acute distress: A&Ox 4, RA, denies pain, no telemetry box in place. Discussed POC: labs, medications, and treatments with patient who demonstrates understanding w/all questions answered. Bed locked/lowest position with call bell and possessions within reach. Patient denies additional needs at this time.

## 2025-05-16 NOTE — DISCHARGE INSTR - CASE MGT
[employer]   [phone]    Low Income housing in [place]:  [place]  [address];    [facility]   [address]     Food Bank:  [address] [place] [address]  [phone]

## 2025-05-17 VITALS
HEART RATE: 83 BPM | WEIGHT: 145.72 LBS | SYSTOLIC BLOOD PRESSURE: 118 MMHG | TEMPERATURE: 97.4 F | HEIGHT: 60 IN | BODY MASS INDEX: 28.61 KG/M2 | RESPIRATION RATE: 16 BRPM | DIASTOLIC BLOOD PRESSURE: 75 MMHG | OXYGEN SATURATION: 92 %

## 2025-05-17 PROBLEM — J90 PLEURAL EFFUSION: Status: RESOLVED | Noted: 2023-07-17 | Resolved: 2025-05-17

## 2025-05-17 PROBLEM — R31.0 GROSS HEMATURIA: Status: RESOLVED | Noted: 2025-05-11 | Resolved: 2025-05-17

## 2025-05-17 PROBLEM — E87.1 HYPONATREMIA: Status: RESOLVED | Noted: 2025-05-14 | Resolved: 2025-05-17

## 2025-05-17 PROBLEM — R78.81 POSITIVE BLOOD CULTURE: Status: RESOLVED | Noted: 2025-05-12 | Resolved: 2025-05-17

## 2025-05-17 PROBLEM — A41.9 SEPTIC SHOCK (HCC): Status: RESOLVED | Noted: 2025-05-11 | Resolved: 2025-05-17

## 2025-05-17 PROBLEM — R65.21 SEPTIC SHOCK (HCC): Status: RESOLVED | Noted: 2025-05-11 | Resolved: 2025-05-17

## 2025-05-17 PROBLEM — E87.29 HIGH ANION GAP METABOLIC ACIDOSIS: Status: RESOLVED | Noted: 2025-05-11 | Resolved: 2025-05-17

## 2025-05-17 PROBLEM — J96.01 ACUTE HYPOXIC RESPIRATORY FAILURE (HCC): Status: RESOLVED | Noted: 2025-05-11 | Resolved: 2025-05-17

## 2025-05-17 LAB
BACTERIA FLD AEROBE CULT: NORMAL
GRAM STN SPEC: NORMAL
SIGNIFICANT IND 70042: NORMAL
SITE SITE: NORMAL
SOURCE SOURCE: NORMAL

## 2025-05-17 PROCEDURE — 700102 HCHG RX REV CODE 250 W/ 637 OVERRIDE(OP): Performed by: HOSPITALIST

## 2025-05-17 PROCEDURE — 700102 HCHG RX REV CODE 250 W/ 637 OVERRIDE(OP)

## 2025-05-17 PROCEDURE — A9270 NON-COVERED ITEM OR SERVICE: HCPCS | Performed by: HOSPITALIST

## 2025-05-17 PROCEDURE — A9270 NON-COVERED ITEM OR SERVICE: HCPCS | Mod: JZ | Performed by: FAMILY MEDICINE

## 2025-05-17 PROCEDURE — A9270 NON-COVERED ITEM OR SERVICE: HCPCS

## 2025-05-17 PROCEDURE — 700111 HCHG RX REV CODE 636 W/ 250 OVERRIDE (IP): Performed by: HOSPITALIST

## 2025-05-17 PROCEDURE — 700102 HCHG RX REV CODE 250 W/ 637 OVERRIDE(OP): Mod: JZ | Performed by: FAMILY MEDICINE

## 2025-05-17 PROCEDURE — 99239 HOSP IP/OBS DSCHRG MGMT >30: CPT | Performed by: FAMILY MEDICINE

## 2025-05-17 RX ORDER — CARVEDILOL 3.12 MG/1
3.12 TABLET ORAL 2 TIMES DAILY WITH MEALS
Qty: 60 TABLET | Refills: 3 | Status: SHIPPED | OUTPATIENT
Start: 2025-05-17 | End: 2025-09-14

## 2025-05-17 RX ORDER — SPIRONOLACTONE 50 MG/1
50 TABLET, FILM COATED ORAL DAILY
Qty: 30 TABLET | Refills: 3 | Status: SHIPPED | OUTPATIENT
Start: 2025-05-18 | End: 2025-05-17

## 2025-05-17 RX ORDER — SPIRONOLACTONE 50 MG/1
50 TABLET, FILM COATED ORAL DAILY
Qty: 30 TABLET | Refills: 2 | Status: SHIPPED | OUTPATIENT
Start: 2025-05-18

## 2025-05-17 RX ORDER — PREDNISOLONE SODIUM PHOSPHATE 15 MG/5ML
40 SOLUTION ORAL DAILY
Qty: 372.4 ML | Refills: 0 | Status: SHIPPED | OUTPATIENT
Start: 2025-05-17 | End: 2025-06-14

## 2025-05-17 RX ORDER — FUROSEMIDE 20 MG/1
20 TABLET ORAL DAILY
Qty: 30 TABLET | Refills: 2 | Status: SHIPPED | OUTPATIENT
Start: 2025-05-17

## 2025-05-17 RX ADMIN — SPIRONOLACTONE 50 MG: 25 TABLET ORAL at 04:47

## 2025-05-17 RX ADMIN — POTASSIUM CHLORIDE 20 MEQ: 1500 TABLET, EXTENDED RELEASE ORAL at 04:47

## 2025-05-17 RX ADMIN — GUAIFENESIN 600 MG: 600 TABLET, EXTENDED RELEASE ORAL at 04:47

## 2025-05-17 RX ADMIN — BENZONATATE 100 MG: 100 CAPSULE ORAL at 04:47

## 2025-05-17 RX ADMIN — FUROSEMIDE 20 MG: 10 INJECTION, SOLUTION INTRAVENOUS at 04:47

## 2025-05-17 RX ADMIN — LACTULOSE 30 ML: 10 SOLUTION ORAL at 04:46

## 2025-05-17 RX ADMIN — BENZONATATE 100 MG: 100 CAPSULE ORAL at 12:45

## 2025-05-17 RX ADMIN — OXYCODONE 5 MG: 5 TABLET ORAL at 04:55

## 2025-05-17 RX ADMIN — OXYCODONE 5 MG: 5 TABLET ORAL at 10:03

## 2025-05-17 RX ADMIN — LACTULOSE 30 ML: 10 SOLUTION ORAL at 12:45

## 2025-05-17 RX ADMIN — MOMETASONE FUROATE AND FORMOTEROL FUMARATE DIHYDRATE 2 PUFF: 100; 5 AEROSOL RESPIRATORY (INHALATION) at 04:46

## 2025-05-17 RX ADMIN — CARVEDILOL 3.12 MG: 3.12 TABLET, FILM COATED ORAL at 09:30

## 2025-05-17 ASSESSMENT — PAIN DESCRIPTION - PAIN TYPE
TYPE: ACUTE PAIN

## 2025-05-17 NOTE — PROGRESS NOTES
Assumed pt care with RN. Pt is A&OX4 and states she is in 3/10 pain and declines interventions at this time. Attempted placing a new IV due to AC placement from ED. However, veins blew on each attempt. Pt asked if its necessary if she is discharging in the morning. I let her know discharge is the plan but not definite. I spoke with charge RN whom stated to wait with replacing IV. Plan of care discussed, no further questions. Personal belongings and call light within reach.

## 2025-05-17 NOTE — CARE PLAN
The patient is Stable - Low risk of patient condition declining or worsening    Shift Goals  Clinical Goals: Pt to remain free from falls during the night, monitor pain level  Patient Goals: Sleep and DC tomorrow  Family Goals: MICKEY    Progress made toward(s) clinical / shift goals:  Pt remained free from falls during the night and her pain was controlled with pain medications.     Patient is not progressing towards the following goals:

## 2025-05-17 NOTE — PROGRESS NOTES
Windy Saldivar has been provided discharge instructions, to include follow up care, home medications, and activity/diet reviewed. Copy of discharge instructions in patient chart, signed and reviewed. Patient verbalizes the understanding of the discharge instructions. PIV removed, tip intact, pressure dressing applied. Arm band removed. Patient did not have home meds during admit. Questions and concerns addressed prior to leaving the discharge lounge. Transported via car, accompanied by . Patient discharge to home.

## 2025-05-17 NOTE — PROGRESS NOTES
Discharged to discharge lounge. Patient states understanding and all questions answered. Patient states that all personal belongings are in possession . Patient off unit via wheelchair, escorted by transport.

## 2025-05-17 NOTE — PROGRESS NOTES
Hospital Medicine Daily Progress Note    Date of Service  5/16/2025    Chief Complaint  Windy Saldivar is a 44 y.o. female admitted 5/11/2025 with shortness of breath.     Hospital Course  Patient is a 44-year-old female with past medical history of alcohol use complicated by decompensated cirrhosis follows with hepatology at [facility] however appears lost to follow-up, medication noncompliance for the past month who presents due to shortness of breath, cough, fever, chills, and general fatigue has been progressively worsening over the past couple of weeks.  Patient states she was driving, pulled over to the side the road as she felt tired and sick.  Ultimately, authorities checked on the patient and stated she needed to go home versus going to the hospital and therefore presented here for evaluation.  Patient was found to have a pulse of 115, respiratory rate of 24, requiring 2 L nasal cannula for 92% saturation, WBC of 11.4 and lactic acid of 6.1.  Chest x-ray demonstrated bilateral airspace opacities concerning for multifocal pneumonia.  Given the above, patient admitted for likely sepsis secondary to pneumonia, 1.5 L of ascitic fluid was drained from the abdomen without SBP findings.  Will provide patient with empiric antibiotic coverage, as well as close monitoring.      Interval Problem Update  5/11 afebrile, slightly tachycardic and on 2 L nasal cannula.  Received paracentesis in the ED at which time they drained 1.5 L.  Trend lactic acid.  Continue lactulose. Check labs in AM.     5/12 afebrile, tachycardia improving on 1.5 L nasal cannula  WBC 7.7, hemoglobin 8.9, platelets 90  Potassium 3.1, replaced  Magnesium 1.7, IV replacement  last lactic 1.7  Extended respiratory viral panel negative  1/2 blood cultures with gram-positive cocci in clusters, pending speciation  Paracentesis culture negative to date  Telemetry reviewed patient in sinus tachycardia overnight  Patient very fatigued.  Having  significant pleuritic chest pain with coughing.  Also having diffuse abdominal pain, denies nausea.        5/13 patient is in bed, she is alert oriented follows commands, she is still coughing and slightly short of breath, trying to wean her off oxygen, cultures probably contamination, discussed with bedside nurse charge nurse  pharmacist, continue close monitoring, hopefully DC home in a day or 2 if able to wean off oxygen and cultures remain negative,  5/14:  Patient is in bed, she is short of breath she is complaining of cough, abdominal distention, patient had paracentesis today, I ordered and reviewed chest x-ray showing small to moderate size right-sided pleural effusion started on IV Lasix spironolactone will repeat chest x-ray in a.m., probably related to liver cirrhosis with ascites, discussed with bedside nurse charge nurse  pharmacist  5/15 patient is in bed, she is still short of breath but appears to be more comfortable, continue having cough, continue to have lower extremity edema, chest x-ray reviewed which showed improved in pulmonary edema and pleural effusion, increase Lasix is to 20 mg twice daily, increase spironolactone to 50 mg daily, patient has history of esophageal varices started on low-dose Coreg with holding parameters, check ammonia level in a.m., discussed with bedside nurse charge nurse  pharmacist.    5/16 patient had paracentesis today with over 2 L withdrawn.  Patient reports that she does feel better has less pressure in her belly but still is having little bit of shortness of breath and abdominal pain.  AFVSS  On room air  H&H improving  Platelets improving  Hypokalemia       I have discussed this patient's plan of care and discharge plan at IDT rounds today with Case Management, Nursing, Nursing leadership, and other members of the IDT team.     Consultants/Specialty  none     Code Status  Full Code     Disposition  The patient is not medically  cleared for discharge to home or a post-acute facility.        I have placed the appropriate orders for post-discharge needs.     Review of Systems  Review of Systems   Constitutional:  Negative for chills, fever and malaise/fatigue.   Respiratory:  Positive for shortness of breath. Negative for cough.    Cardiovascular:  Negative for chest pain, palpitations and leg swelling.   Gastrointestinal:  Negative for abdominal pain, diarrhea, heartburn, nausea and vomiting.   Genitourinary:  Negative for dysuria, frequency and urgency.   Neurological:  Negative for dizziness and headaches.   All other systems reviewed and are negative.    Physical Exam  Temp:  [36.2 °C (97.2 °F)-36.6 °C (97.9 °F)] 36.3 °C (97.4 °F)  Pulse:  [63-94] 76  Resp:  [16-18] 18  BP: ()/(57-71) 99/68  SpO2:  [90 %-99 %] 93 %    Physical Exam  Vitals and nursing note reviewed.   Constitutional:       Appearance: She is not ill-appearing.   Eyes:      General: Scleral icterus present.   Cardiovascular:      Rate and Rhythm: Normal rate and regular rhythm.   Pulmonary:      Effort: Pulmonary effort is normal.      Breath sounds:No stridor.   Abdominal:      General: There is distension.   Musculoskeletal:      Right lower leg: No edema.      Left lower leg: No edema.   Skin:     Capillary Refill: Capillary refill takes less than 2 seconds.   Neurological:      General: No focal deficit present.      Mental Status: She is alert and oriented to person, place, and time. Mental status is at baseline.    Psychiatric:           Speech: Speech normal.         Behavior: Behavior normal. Behavior is cooperative.     Fluids    Intake/Output Summary (Last 24 hours) at 5/16/2025 1753  Last data filed at 5/16/2025 1409  Gross per 24 hour   Intake 940 ml   Output --   Net 940 ml        Laboratory  Recent Labs     05/14/25  0450 05/15/25  0259 05/16/25  0544   WBC 7.0 7.2 8.2   RBC 2.83* 2.91* 3.34*   HEMOGLOBIN 8.8* 9.2* 10.7*   HEMATOCRIT 28.7* 29.1* 32.5*    .4* 100.0* 97.3   MCH 31.1 31.6 32.0   MCHC 30.7* 31.6* 32.9   RDW 66.5* 67.2* 61.8*   PLATELETCT 94* 89* 105*   MPV 9.4 9.6 9.3     Recent Labs     05/14/25  0450 05/15/25  0259 05/16/25  0544   SODIUM 134* 137 134*   POTASSIUM 3.6 3.2* 3.2*   CHLORIDE 104 104 102   CO2 23 22 26   GLUCOSE 114* 131* 106*   BUN 6* 6* 7*   CREATININE 0.33* 0.42* 0.41*   CALCIUM 8.0* 8.0* 8.2*                   Assessment/Plan  * Septic shock (HCC)- (present on admission)  Assessment & Plan  This is Sepsis Present on admission  SIRS criteria identified on my evaluation include: Tachycardia, with heart rate greater than 90 BPM and Tachypnea, with respirations greater than 20 per minute  Clinical indicators of end organ dysfunction include persistent lactic acidosis greater than 2  Source is likely pulmonary  Sepsis protocol initiated  Crystalloid Fluid Administration: Fluid resuscitation ordered per standard protocol - 30 mL/kg per current or ideal body weight  IV antibiotics as appropriate for source of sepsis  Reassessment: I have reassessed the patient's hemodynamic status    Lactic 6 on admission, repeat 5.9.  After multiple repeats now within normal limits  Paracentesis of 1.5 L drained in the emergency department, ascitic fluid does not appear to be SBP  Follow blood cultures, sputum cultures  - COVID/flu/RSV unremarkable  - Empiric Unasyn and azithromycin    History of esophageal varices- (present on admission)  Assessment & Plan  Will start low-dose Coreg    Hyponatremia  Assessment & Plan  Monitoring  Follow-up BMP in a.m.    Positive blood culture- (present on admission)  Assessment & Plan  1/2 blood culture positive for gram-positive cocci in clusters, pending speciation  -Possible contaminant?  Repeat blood cultures 5/13      History of medication noncompliance  Assessment & Plan  Patient states has been noncompliant with her home medications for the past month    Acute hypoxic respiratory failure (HCC)  Assessment  & Plan  .  Patient with acute approximately failure requiring 2 L nasal cannula, baseline room air with chest x-ray presenting multifocal opacities concerning for atypical pneumonia  - Will provide empiric Unasyn and azithromycin    Continue monitoring  Continue breathing treatments    High anion gap metabolic acidosis  Assessment & Plan  Bicarb 17, anion gap 18, lactic acid of 6.0, likely secondary to sepsis with lactic acidosis  - Fluid resuscitation, caution given patient's decompensated cirrhosis    Gross hematuria  Assessment & Plan  Moderate blood noted on urinalysis with few RBCs  - Ordered CPK  - Likely contaminant from menstrual cycle which started today and PureWick    Resolved    Thrombocytopenia (HCC)- (present on admission)  Assessment & Plan  Continue monitoring  Continue holding Lovenox for DVT prophylaxis patient had mild hematuria today.    Hyperbilirubinemia- (present on admission)  Assessment & Plan  T. bili of 11.9, appears to be downtrending likely secondary to patient's decompensated cirrhosis    Ovarian mass- (present on admission)  Assessment & Plan  History of ovarian mass with elevated , lost to follow up.    Please follow-up as outpatient    Alcoholic cirrhosis (HCC)- (present on admission)  Assessment & Plan  History of decompensated alcoholic cirrhosis followed by [facility] hepatology  - Previously on lactulose and rifaximin, however patient has not been on oral medication for a month  - Will restart lactulose and titrate bowel movement to 3/day  - Was previously on carvedilol as well, however will hold as patient did not comply with medications in the setting of acute sepsis with tachycardia      Continue monitoring  Paracentesis today fluid analysis did not show signs of SBP increase Lasix to 20 mg twice daily increase spironolactone to 50 mg  Started on Coreg  Check ammonia level in a.m.            Pleural effusion- (present on admission)  Assessment & Plan  Right sided  Started  on iv lasix  Started on spironolactone  S/p paracentesis it should improve pleural effusion   F/u cxr in am.   Improved         VTE prophylaxis:     I have performed a physical exam and reviewed and updated ROS and Plan today (5/16/2025). In review of yesterday's note (5/15/2025), there are no changes except as documented above.

## 2025-05-17 NOTE — DISCHARGE SUMMARY
Discharge Summary    CHIEF COMPLAINT ON ADMISSION  Chief Complaint   Patient presents with    RUQ Pain     X few days, pt states she has had a cough for a couple of weeks that thinks is possibly related    Abdominal Swelling     Hx of Liver failure/cirrhosis r/t ETOH, pt had a paracentesis approx 2 months ago        Reason for Admission  EMS     Admission Date  5/11/2025    CODE STATUS  Full Code    HPI & HOSPITAL COURSE  Patient is a 44-year-old female with past medical history of alcohol use complicated by decompensated cirrhosis follows with hepatology at [facility] however appears lost to follow-up, medication noncompliance for the past month who presents due to shortness of breath, cough, fever, chills, and general fatigue has been progressively worsening over the past couple of weeks.  Patient states she was driving, pulled over to the side the road as she felt tired and sick.  Ultimately, authorities checked on the patient and stated she needed to go home versus going to the hospital and therefore presented here for evaluation.  Patient was found to have a pulse of 115, respiratory rate of 24, requiring 2 L nasal cannula for 92% saturation, WBC of 11.4 and lactic acid of 6.1.  Chest x-ray demonstrated bilateral airspace opacities concerning for multifocal pneumonia.  Given the above, patient admitted for likely sepsis secondary to pneumonia, 1.5 L of ascitic fluid was drained from the abdomen without SBP findings.  Will provide patient with empiric antibiotic coverage, as well as close monitoring.     Has been on the medical floor recovering well.  Patient did recover from her pneumonia and is now on room air.  Patient has also had a second paracentesis on 5/16/2025 with just over 2 L removed.  Patient was then returned to the floor was complaining of some abdominal pain and shortness of breath.  Patient seen and examined today she is afebrile her vitals are stable abdominal pain is resolved shortness of breath  is resolved patient is stable for discharge home.    Patient also given a referral for gastroenterology follow-up.    Therefore, she is discharged in fair and stable condition to home with close outpatient follow-up.    The patient met 2-midnight criteria for an inpatient stay at the time of discharge.    Discharge Date  5/17/2025    FOLLOW UP ITEMS POST DISCHARGE  Primary care provider and GI.    DISCHARGE DIAGNOSES  Principal Problem (Resolved):    Septic shock (HCC) (POA: Yes)  Active Problems:    Alcoholic cirrhosis (HCC) (POA: Yes)    Ovarian mass (POA: Yes)    Hyperbilirubinemia (POA: Yes)    Thrombocytopenia (HCC) (POA: Yes)    History of medication noncompliance (POA: Yes)    History of esophageal varices (POA: Yes)  Resolved Problems:    Pleural effusion (POA: Yes)    Gross hematuria (POA: Unknown)    High anion gap metabolic acidosis (POA: Yes)    Acute hypoxic respiratory failure (HCC) (POA: Unknown)    Positive blood culture (POA: Yes)    Hyponatremia (POA: Yes)      FOLLOW UP  No future appointments.  No follow-up provider specified.    MEDICATIONS ON DISCHARGE     Medication List        START taking these medications        Instructions   carvedilol 3.125 MG Tabs  Commonly known as: Coreg   Take 1 Tablet by mouth 2 times a day with meals for 120 days.  Dose: 3.125 mg     prednisoLONE sodium phosphate 15 MG/5ML oral solution  Commonly known as: Pediapred   Doctor's comments:    Take 13.3 mL by mouth every day for 28 days.  Dose: 39.9 mg     spironolactone 50 MG Tabs  Start taking on: May 18, 2025  Commonly known as: Aldactone   Take 1 Tablet by mouth every day.  Dose: 50 mg            CONTINUE taking these medications        Instructions   albuterol 108 (90 Base) MCG/ACT Aers inhalation aerosol   Inhale 2 Puffs by mouth every four hours as needed for Shortness of Breath.  Dose: 2 Puff     budesonide-formoterol 80-4.5 MCG/ACT Aero  Commonly known as: Symbicort   Inhale 2 Puffs every day.  Dose: 2 Puff    "           Allergies  Allergies[1]    DIET  Orders Placed This Encounter   Procedures    Diet Order Diet: Cardiac     Standing Status:   Standing     Number of Occurrences:   1     Diet::   Cardiac [6]       ACTIVITY  As tolerated.  Weight bearing as tolerated    CONSULTATIONS  None    PROCEDURES  None    LABORATORY  Lab Results   Component Value Date    SODIUM 134 (L) 05/16/2025    POTASSIUM 3.2 (L) 05/16/2025    CHLORIDE 102 05/16/2025    CO2 26 05/16/2025    GLUCOSE 106 (H) 05/16/2025    BUN 7 (L) 05/16/2025    CREATININE 0.41 (L) 05/16/2025        Lab Results   Component Value Date    WBC 8.2 05/16/2025    HEMOGLOBIN 10.7 (L) 05/16/2025    HEMATOCRIT 32.5 (L) 05/16/2025    PLATELETCT 105 (L) 05/16/2025        Total time of the discharge process exceeds 45 minutes.       [1]   Allergies  Allergen Reactions    Aspirin Shortness of Breath     Shortness of breath, asthma exacerbation, and \"a congested feeling.\"     "

## 2025-05-17 NOTE — PROGRESS NOTES
Received report and assumed care of patient at change of shift. Patient is A&Ox4, on room air, and reports 7/10 pain at this time. Patient medication per MAR. Patient assessment completed, bed in lowest position, and call light and personal belongings are within reach. Patient expressed no further needs at this time.

## 2025-05-18 LAB
BACTERIA BLD CULT: NORMAL
BACTERIA BLD CULT: NORMAL
SIGNIFICANT IND 70042: NORMAL
SIGNIFICANT IND 70042: NORMAL
SITE SITE: NORMAL
SITE SITE: NORMAL
SOURCE SOURCE: NORMAL
SOURCE SOURCE: NORMAL

## (undated) DEVICE — MASK PANORAMIC OXYGEN PRO2 (30EA/CA)

## (undated) DEVICE — NEPTUNE 4 PORT MANIFOLD - (20/PK)

## (undated) DEVICE — SET LEADWIRE 5 LEAD BEDSIDE DISPOSABLE ECG (1SET OF 5/EA)

## (undated) DEVICE — BITE BLOCK, DISP.

## (undated) DEVICE — SENSOR OXIMETER ADULT SPO2 RD SET (20EA/BX)

## (undated) DEVICE — CANISTER SUCTION RIGID RED 1500CC (40EA/CA)

## (undated) DEVICE — WATER IRRIGATION STERILE 1000ML (12EA/CA)

## (undated) DEVICE — TOWEL STOP TIMEOUT SAFETY FLAG (40EA/CA)

## (undated) DEVICE — SET EXTENSION WITH 2 PORTS (48EA/CA) ***PART #2C8610 IS A SUBSTITUTE*****

## (undated) DEVICE — TUBE CONNECTING SUCTION - CLEAR PLASTIC STERILE 72 IN (50EA/CA)

## (undated) DEVICE — KIT CUSTOM PROCEDURE SINGLE FOR ENDO  (15/CA)

## (undated) DEVICE — ELECTRODE 850 FOAM ADHESIVE - HYDROGEL RADIOTRNSPRNT (50/PK)

## (undated) DEVICE — FILM CASSETTE ENDO